# Patient Record
Sex: FEMALE | Race: WHITE | Employment: OTHER | URBAN - METROPOLITAN AREA
[De-identification: names, ages, dates, MRNs, and addresses within clinical notes are randomized per-mention and may not be internally consistent; named-entity substitution may affect disease eponyms.]

---

## 2017-03-01 ENCOUNTER — HOSPITAL ENCOUNTER (OUTPATIENT)
Dept: CT IMAGING | Age: 82
Discharge: HOME | End: 2017-03-01
Attending: PHYSICIAN ASSISTANT
Payer: MEDICARE

## 2017-03-01 DIAGNOSIS — R93.89 ABNORMAL CT SCAN: ICD-10-CM

## 2017-03-01 PROCEDURE — 71250 CT THORAX DX C-: CPT

## 2017-05-15 NOTE — PERIOP NOTES
[facility]  Ambulatory Surgery Unit  Pre-operative Instructions for Endo Procedures    Procedure Date  5/17/15            Tentative Arrival Time TBA      1. On the day of your procedure, please report to the Ambulatory Surgery Unit Registration Desk and sign in at your designated time. The Ambulatory Surgery Unit is located in [facility] on the [facility] side of the [facility] across from the [address] [place] [address]. Please have all of your health insurance cards and a photo ID. 2. You must have someone with you to drive you home, as you should not drive a car for 24 hours following anesthesia. Please make arrangements for a responsible adult friend or family member to stay with you for at least the first 24 hours after your procedure. 3. Do not have anything to eat or drink (including water, gum, mints, coffee, juice) after midnight   5/16/17. This may not apply to medications prescribed by your physician. (Please note below the special instructions with medications to take the morning of your procedure.)    4. If applicable, follow the clear liquid diet and bowel prep instructions provided by your physician's office. If you do not have this information, or have any questions, please contact your physician's office. 5. We recommend you do not drink any alcoholic beverages for 24 hours before and after your procedure. 6. Stop all Aspirin, non-steroidal anti-inflammatory drugs (i.e. Advil, Aleve), vitamins, and supplements as directed by your surgeon's office. **If you are currently taking Plavix, Coumadin, or other blood-thinning agents, contact your surgeon for instructions. **    7. In an effort to help prevent surgical site infection, we ask that you shower with an anti-bacterial soap (i.e. Dial or Safeguard) on the morning of your procedure. Do not apply any lotions, powders, or deodorants after showering. 8. Wear comfortable clothes. Wear glasses instead of contacts.  Do not bring any jewelry or money (other than copays or fees as instructed). Do not wear make-up, particularly mascara, the morning of your procedure. Wear your hair loose or down, no ponytails, buns, abdi pins or clips. All body piercings must be removed. 9. You should understand that if you do not follow these instructions your procedure may be cancelled. If your physical condition changes (i.e. fever, cold or flu) please contact your surgeon as soon as possible. 10. It is important that you be on time. If a situation occurs where you may be late, or if you have any questions or problems, please call [phone]. 11. Your procedure time may be subject to change. You will receive a phone call the day prior to confirm your arrival time. Special Instructions: Take all medications and inhalers, as prescribed, on the morning of surgery with a sip of water EXCEPT: plavix, lipitor, aldactazide      I understand a pre-operative phone call will be made to verify my procedure time. In the event that I am not available, I give permission for a message to be left on my answering service and/or with another person? Yes    Instructions given to patient during pat phone call.  Patient verbalized understanding.         ___________________      ___________________      ___________________  (Signature of Patient)          (Witness)                   (Date and Time)

## 2017-05-16 ENCOUNTER — ANESTHESIA EVENT (OUTPATIENT)
Dept: SURGERY | Age: 82
End: 2017-05-16
Payer: MEDICARE

## 2017-05-17 ENCOUNTER — HOSPITAL ENCOUNTER (OUTPATIENT)
Age: 82
Setting detail: OUTPATIENT SURGERY
Discharge: HOME | End: 2017-05-17
Attending: OTOLARYNGOLOGY | Admitting: OTOLARYNGOLOGY
Payer: MEDICARE

## 2017-05-17 ENCOUNTER — ANESTHESIA (OUTPATIENT)
Dept: SURGERY | Age: 82
End: 2017-05-17
Payer: MEDICARE

## 2017-05-17 VITALS
DIASTOLIC BLOOD PRESSURE: 84 MMHG | BODY MASS INDEX: 20.49 KG/M2 | HEART RATE: 59 BPM | TEMPERATURE: 97.6 F | RESPIRATION RATE: 14 BRPM | SYSTOLIC BLOOD PRESSURE: 161 MMHG | HEIGHT: 65 IN | WEIGHT: 123 LBS | OXYGEN SATURATION: 100 %

## 2017-05-17 LAB
ATRIAL RATE: 56 BPM
CALCULATED P AXIS, ECG09: 62 DEGREES
CALCULATED R AXIS, ECG10: -37 DEGREES
CALCULATED T AXIS, ECG11: 52 DEGREES
DIAGNOSIS, 93000: NORMAL
P-R INTERVAL, ECG05: 172 MS
Q-T INTERVAL, ECG07: 486 MS
QRS DURATION, ECG06: 96 MS
QTC CALCULATION (BEZET), ECG08: 468 MS
VENTRICULAR RATE, ECG03: 56 BPM

## 2017-05-17 PROCEDURE — 77030021440: Performed by: OTOLARYNGOLOGY

## 2017-05-17 PROCEDURE — 74011250636 HC RX REV CODE- 250/636: Performed by: ANESTHESIOLOGY

## 2017-05-17 PROCEDURE — 76030000000 HC AMB SURG OR TIME 0.5 TO 1: Performed by: OTOLARYNGOLOGY

## 2017-05-17 PROCEDURE — 77030018836 HC SOL IRR NACL ICUM -A: Performed by: OTOLARYNGOLOGY

## 2017-05-17 PROCEDURE — 76210000050 HC AMBSU PH II REC 0.5 TO 1 HR: Performed by: OTOLARYNGOLOGY

## 2017-05-17 PROCEDURE — 77030021352 HC CBL LD SYS DISP COVD -B: Performed by: OTOLARYNGOLOGY

## 2017-05-17 PROCEDURE — 76210000040 HC AMBSU PH I REC FIRST 0.5 HR: Performed by: OTOLARYNGOLOGY

## 2017-05-17 PROCEDURE — 77030021668 HC NEB PREFIL KT VYRM -A: Performed by: OTOLARYNGOLOGY

## 2017-05-17 PROCEDURE — 76060000061 HC AMB SURG ANES 0.5 TO 1 HR: Performed by: OTOLARYNGOLOGY

## 2017-05-17 PROCEDURE — 74011250636 HC RX REV CODE- 250/636: Performed by: OTOLARYNGOLOGY

## 2017-05-17 PROCEDURE — 77030026438 HC STYL ET INTUB CARD -A: Performed by: NURSE ANESTHETIST, CERTIFIED REGISTERED

## 2017-05-17 PROCEDURE — 74011000250 HC RX REV CODE- 250

## 2017-05-17 PROCEDURE — 74011250636 HC RX REV CODE- 250/636

## 2017-05-17 PROCEDURE — 93005 ELECTROCARDIOGRAM TRACING: CPT

## 2017-05-17 PROCEDURE — 77030020255 HC SOL INJ LR 1000ML BG: Performed by: OTOLARYNGOLOGY

## 2017-05-17 PROCEDURE — 77030008684 HC TU ET CUF COVD -B: Performed by: NURSE ANESTHETIST, CERTIFIED REGISTERED

## 2017-05-17 RX ORDER — ONDANSETRON 2 MG/ML
INJECTION INTRAMUSCULAR; INTRAVENOUS AS NEEDED
Status: DISCONTINUED | OUTPATIENT
Start: 2017-05-17 | End: 2017-05-17 | Stop reason: HOSPADM

## 2017-05-17 RX ORDER — PROPOFOL 10 MG/ML
INJECTION, EMULSION INTRAVENOUS AS NEEDED
Status: DISCONTINUED | OUTPATIENT
Start: 2017-05-17 | End: 2017-05-17 | Stop reason: HOSPADM

## 2017-05-17 RX ORDER — LIDOCAINE HYDROCHLORIDE 20 MG/ML
INJECTION, SOLUTION EPIDURAL; INFILTRATION; INTRACAUDAL; PERINEURAL AS NEEDED
Status: DISCONTINUED | OUTPATIENT
Start: 2017-05-17 | End: 2017-05-17 | Stop reason: HOSPADM

## 2017-05-17 RX ORDER — FENTANYL CITRATE 50 UG/ML
INJECTION, SOLUTION INTRAMUSCULAR; INTRAVENOUS AS NEEDED
Status: DISCONTINUED | OUTPATIENT
Start: 2017-05-17 | End: 2017-05-17 | Stop reason: HOSPADM

## 2017-05-17 RX ORDER — SODIUM CHLORIDE 0.9 % (FLUSH) 0.9 %
5-10 SYRINGE (ML) INJECTION AS NEEDED
Status: DISCONTINUED | OUTPATIENT
Start: 2017-05-17 | End: 2017-05-17 | Stop reason: HOSPADM

## 2017-05-17 RX ORDER — GLYCOPYRROLATE 0.2 MG/ML
INJECTION INTRAMUSCULAR; INTRAVENOUS AS NEEDED
Status: DISCONTINUED | OUTPATIENT
Start: 2017-05-17 | End: 2017-05-17 | Stop reason: HOSPADM

## 2017-05-17 RX ORDER — MAG HYDROX/ALUMINUM HYD/SIMETH 200-200-20
SUSPENSION, ORAL (FINAL DOSE FORM) ORAL
Status: DISCONTINUED
Start: 2017-05-17 | End: 2017-05-17 | Stop reason: HOSPADM

## 2017-05-17 RX ORDER — SUCCINYLCHOLINE CHLORIDE 20 MG/ML
INJECTION INTRAMUSCULAR; INTRAVENOUS AS NEEDED
Status: DISCONTINUED | OUTPATIENT
Start: 2017-05-17 | End: 2017-05-17 | Stop reason: HOSPADM

## 2017-05-17 RX ORDER — SODIUM CHLORIDE, SODIUM LACTATE, POTASSIUM CHLORIDE, CALCIUM CHLORIDE 600; 310; 30; 20 MG/100ML; MG/100ML; MG/100ML; MG/100ML
25 INJECTION, SOLUTION INTRAVENOUS CONTINUOUS
Status: DISCONTINUED | OUTPATIENT
Start: 2017-05-17 | End: 2017-05-17 | Stop reason: HOSPADM

## 2017-05-17 RX ORDER — ROCURONIUM BROMIDE 10 MG/ML
INJECTION, SOLUTION INTRAVENOUS AS NEEDED
Status: DISCONTINUED | OUTPATIENT
Start: 2017-05-17 | End: 2017-05-17 | Stop reason: HOSPADM

## 2017-05-17 RX ORDER — MORPHINE SULFATE 10 MG/ML
2 INJECTION, SOLUTION INTRAMUSCULAR; INTRAVENOUS
Status: DISCONTINUED | OUTPATIENT
Start: 2017-05-17 | End: 2017-05-17 | Stop reason: HOSPADM

## 2017-05-17 RX ORDER — ALUMINA, MAGNESIA, AND SIMETHICONE 2400; 2400; 240 MG/30ML; MG/30ML; MG/30ML
30 SUSPENSION ORAL
Status: DISCONTINUED | OUTPATIENT
Start: 2017-05-17 | End: 2017-05-17 | Stop reason: HOSPADM

## 2017-05-17 RX ORDER — LIDOCAINE HYDROCHLORIDE 10 MG/ML
0.1 INJECTION, SOLUTION EPIDURAL; INFILTRATION; INTRACAUDAL; PERINEURAL AS NEEDED
Status: DISCONTINUED | OUTPATIENT
Start: 2017-05-17 | End: 2017-05-17 | Stop reason: HOSPADM

## 2017-05-17 RX ORDER — DIPHENHYDRAMINE HYDROCHLORIDE 50 MG/ML
12.5 INJECTION, SOLUTION INTRAMUSCULAR; INTRAVENOUS AS NEEDED
Status: DISCONTINUED | OUTPATIENT
Start: 2017-05-17 | End: 2017-05-17 | Stop reason: HOSPADM

## 2017-05-17 RX ORDER — ACETAMINOPHEN 10 MG/ML
INJECTION, SOLUTION INTRAVENOUS AS NEEDED
Status: DISCONTINUED | OUTPATIENT
Start: 2017-05-17 | End: 2017-05-17 | Stop reason: HOSPADM

## 2017-05-17 RX ORDER — OXYCODONE AND ACETAMINOPHEN 5; 325 MG/1; MG/1
1 TABLET ORAL ONCE
Status: DISCONTINUED | OUTPATIENT
Start: 2017-05-17 | End: 2017-05-17 | Stop reason: HOSPADM

## 2017-05-17 RX ORDER — FAMOTIDINE 10 MG/ML
INJECTION INTRAVENOUS
Status: COMPLETED
Start: 2017-05-17 | End: 2017-05-17

## 2017-05-17 RX ORDER — FENTANYL CITRATE 50 UG/ML
25 INJECTION, SOLUTION INTRAMUSCULAR; INTRAVENOUS
Status: DISCONTINUED | OUTPATIENT
Start: 2017-05-17 | End: 2017-05-17 | Stop reason: HOSPADM

## 2017-05-17 RX ORDER — NALOXONE HYDROCHLORIDE 0.4 MG/ML
INJECTION, SOLUTION INTRAMUSCULAR; INTRAVENOUS; SUBCUTANEOUS AS NEEDED
Status: DISCONTINUED | OUTPATIENT
Start: 2017-05-17 | End: 2017-05-17 | Stop reason: HOSPADM

## 2017-05-17 RX ORDER — CLINDAMYCIN PHOSPHATE 900 MG/50ML
900 INJECTION INTRAVENOUS ONCE
Status: COMPLETED | OUTPATIENT
Start: 2017-05-17 | End: 2017-05-17

## 2017-05-17 RX ORDER — SODIUM CHLORIDE 0.9 % (FLUSH) 0.9 %
5-10 SYRINGE (ML) INJECTION EVERY 8 HOURS
Status: DISCONTINUED | OUTPATIENT
Start: 2017-05-17 | End: 2017-05-17 | Stop reason: HOSPADM

## 2017-05-17 RX ORDER — DEXAMETHASONE SODIUM PHOSPHATE 4 MG/ML
10 INJECTION, SOLUTION INTRA-ARTICULAR; INTRALESIONAL; INTRAMUSCULAR; INTRAVENOUS; SOFT TISSUE ONCE
Status: COMPLETED | OUTPATIENT
Start: 2017-05-17 | End: 2017-05-17

## 2017-05-17 RX ORDER — HYDROMORPHONE HYDROCHLORIDE 1 MG/ML
.2-.5 INJECTION, SOLUTION INTRAMUSCULAR; INTRAVENOUS; SUBCUTANEOUS ONCE
Status: DISCONTINUED | OUTPATIENT
Start: 2017-05-17 | End: 2017-05-17 | Stop reason: HOSPADM

## 2017-05-17 RX ADMIN — FENTANYL CITRATE 25 MCG: 50 INJECTION, SOLUTION INTRAMUSCULAR; INTRAVENOUS at 08:01

## 2017-05-17 RX ADMIN — SUCCINYLCHOLINE CHLORIDE 120 MG: 20 INJECTION INTRAMUSCULAR; INTRAVENOUS at 08:04

## 2017-05-17 RX ADMIN — FENTANYL CITRATE 25 MCG: 50 INJECTION, SOLUTION INTRAMUSCULAR; INTRAVENOUS at 08:24

## 2017-05-17 RX ADMIN — LIDOCAINE HYDROCHLORIDE 60 MG: 20 INJECTION, SOLUTION EPIDURAL; INFILTRATION; INTRACAUDAL; PERINEURAL at 08:04

## 2017-05-17 RX ADMIN — FENTANYL CITRATE 25 MCG: 50 INJECTION, SOLUTION INTRAMUSCULAR; INTRAVENOUS at 08:04

## 2017-05-17 RX ADMIN — ROCURONIUM BROMIDE 10 MG: 10 INJECTION, SOLUTION INTRAVENOUS at 08:04

## 2017-05-17 RX ADMIN — GLYCOPYRROLATE 0.1 MG: 0.2 INJECTION INTRAMUSCULAR; INTRAVENOUS at 08:04

## 2017-05-17 RX ADMIN — NALOXONE HYDROCHLORIDE 0.1 MG: 0.4 INJECTION, SOLUTION INTRAMUSCULAR; INTRAVENOUS; SUBCUTANEOUS at 08:50

## 2017-05-17 RX ADMIN — DEXAMETHASONE SODIUM PHOSPHATE 10 MG: 4 INJECTION, SOLUTION INTRAMUSCULAR; INTRAVENOUS at 07:14

## 2017-05-17 RX ADMIN — PROPOFOL 50 MG: 10 INJECTION, EMULSION INTRAVENOUS at 08:04

## 2017-05-17 RX ADMIN — ONDANSETRON 4 MG: 2 INJECTION INTRAMUSCULAR; INTRAVENOUS at 08:13

## 2017-05-17 RX ADMIN — FENTANYL CITRATE 25 MCG: 50 INJECTION, SOLUTION INTRAMUSCULAR; INTRAVENOUS at 08:10

## 2017-05-17 RX ADMIN — NALOXONE HYDROCHLORIDE 0.1 MG: 0.4 INJECTION, SOLUTION INTRAMUSCULAR; INTRAVENOUS; SUBCUTANEOUS at 08:42

## 2017-05-17 RX ADMIN — NALOXONE HYDROCHLORIDE 0.1 MG: 0.4 INJECTION, SOLUTION INTRAMUSCULAR; INTRAVENOUS; SUBCUTANEOUS at 08:47

## 2017-05-17 RX ADMIN — PROPOFOL 50 MG: 10 INJECTION, EMULSION INTRAVENOUS at 08:24

## 2017-05-17 RX ADMIN — FAMOTIDINE 20 MG: 10 INJECTION, SOLUTION INTRAVENOUS at 09:31

## 2017-05-17 RX ADMIN — ACETAMINOPHEN 1000 MG: 10 INJECTION, SOLUTION INTRAVENOUS at 08:13

## 2017-05-17 RX ADMIN — PROPOFOL 50 MG: 10 INJECTION, EMULSION INTRAVENOUS at 08:10

## 2017-05-17 RX ADMIN — CLINDAMYCIN PHOSPHATE 900 MG: 18 INJECTION, SOLUTION INTRAVENOUS at 08:01

## 2017-05-17 RX ADMIN — PROPOFOL 50 MG: 10 INJECTION, EMULSION INTRAVENOUS at 08:05

## 2017-05-17 RX ADMIN — NALOXONE HYDROCHLORIDE 0.1 MG: 0.4 INJECTION, SOLUTION INTRAMUSCULAR; INTRAVENOUS; SUBCUTANEOUS at 08:43

## 2017-05-17 RX ADMIN — SODIUM CHLORIDE, SODIUM LACTATE, POTASSIUM CHLORIDE, AND CALCIUM CHLORIDE 25 ML/HR: 600; 310; 30; 20 INJECTION, SOLUTION INTRAVENOUS at 07:13

## 2017-05-17 NOTE — OP NOTES
[address], [address]   OP NOTE       Name:  April Tucker   MR#:  [id number]   :  1935   Account #:  [de-identified]    Surgery Date:  2017   Date of Adm:  2017       PREOPERATIVE DIAGNOSIS: Dysphagia, cricopharyngeal muscle   spasm. POSTOPERATIVE DIAGNOSIS: Dysphagia, cricopharyngeal muscle   spasm. PROCEDURES PERFORMED:  Telescopic direct laryngoscopy,   esophagoscopy, Botox injection to cricopharyngeal muscle. INDICATIONS: The patient is an 80-year-old female with a long history   of chronic dysphagia. Previous esophageal dilations have been   unhelpful. A previous barium swallow confirmed the presence of a   significantly restricted hypopharynx due to cricopharyngeal muscle   spasm. Preoperatively, the alternatives, potential benefits, and   possible risks of the procedure were explained to the patient, who   understood and requested to proceed. SURGEONS   1. Sander Hilliard. Aayush Rodriguez MD   2. Magalie Zaldivar. MD Callie    ANESTHESIA: General endotracheal tube anesthesia. ESTIMATED BLOOD LOSS: 0.2 mL. SPECIMENS REMOVED: None. COMPLICATIONS: None. DESCRIPTION OF PROCEDURE: The patient was brought to the   operating room, placed supine on the operating table, and following the   smooth induction of general endotracheal tube anesthesia, the table   was turned 90 degrees and the patient was positioned for operation. The Jako laryngoscope was advanced into the oral cavity after   application of a maxillary alveolar guard. The base of tongue, vallecula,   piriform sinuses, postcricoid area, and endolarynx were inspected. A   residual food from yesterday's meals was observed laterally within the   region of the pharynx and piriform sinuses. The mucosa was cleared of   all food debris. The mucosa appeared intact throughout the larynx and   pharyngeal regions.  With advancement of the laryngoscope, the   cricopharyngeus region could be identified. No evidence of Zenker's   diverticulum could be seen. Attention was turned to esophagoscopy. The 29 cm rigid esophagoscope was fully passed. The   cricopharyngeus muscle region was identified. The esophagus distal   on examination showed no mucosal surface abnormalities. Once   again, a Zenker's diverticulum was not identified. Attention was then   turned to Botox injection of the cricopharyngeus. The esophagoscope   was removed and the laryngoscope was again utilized to visualize the   cricopharyngeus region. The laryngoscope was placed into suspension   and the operating telescope was used to conclusively identify the   cricopharyngeus muscle with palpation using a blunt suction. Once the   muscle was clearly identified, 100 units of botulinum toxin type A was   injected in 4 quadrants with care taken to avoid proximity to the   posterior cricoarytenoid muscle. Once the injection was completed, the   area was inspected. There was no evidence of significant bleeding and   the laryngoscope was removed from the oral cavity as well as the   maxillary alveolar guard. The patient tolerated the procedure well, was   aroused from general anesthesia, extubated in the operating room,   and transferred to the recovery area in satisfactory condition.         Marlene Paez MD      [facility] / [facility]   D:  05/17/2017   09:13   T:  05/17/2017   09:38   Job #:  [id number]

## 2017-05-17 NOTE — ANESTHESIA PREPROCEDURE EVALUATION
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  Psychiatric history ( depression)    Comments: Diabetic neuropathy Cardiovascular    Hypertension              Exercise tolerance: >4 METS  Comments: No EKG on file   GI/Hepatic/Renal     GERD          Comments: dysphagia Endo/Other    Diabetes: type 2  Hypothyroidism       Other Findings   Comments: Sjogrens syndrome  Systemic Lupus           Physical Exam    Airway  Mallampati: III  TM Distance: 4 - 6 cm    Mouth opening: Normal     Cardiovascular  Regular rate and rhythm,  S1 and S2 normal,  no murmur, click, rub, or gallop  Rhythm: regular  Rate: normal      Pertinent negatives: No murmur   Dental    Dentition: Full lower dentures and Full upper dentures     Pulmonary  Breath sounds clear to auscultation               Abdominal  GI exam deferred       Other Findings            Anesthetic Plan    ASA: 3  Anesthesia type: total IV anesthesia and general          Induction: Intravenous  Anesthetic plan and risks discussed with: Patient      Took beta blocker this am.

## 2017-05-17 NOTE — IP AVS SNAPSHOT
[address] [facility] [address] [phone] Patient: Floyd Chavira MRN: [id number] :1935 You are allergic to the following Allergen Reactions Imuran (Azathioprine) Other (comments) Lupus attack Iodinated Contrast Media - Oral And Iv Dye Hives Gadavist (Gadobutrol) Hives Itching Scant hives to back post receiving injection of gadavist. Denied any breathing problems or throat swelling. Resolved within 30 minutes. MRI contrast Gadolinnium) Recent Documentation Height Weight BMI OB Status Smoking Status 1.651 m 55.8 kg 20.47 kg/m2 Postmenopausal Never Smoker Emergency Contacts Name Discharge Info Relation Home Work Mobile Rafael Lawson DISCHARGE CAREGIVER [3] Son [22] [phone] Mee Stewrad DISCHARGE CAREGIVER [3] Child [2] [phone] About your hospitalization You were admitted on:  May 17, 2017 You last received care in the:  [facility] ASU PACU You were discharged on:  May 17, 2017 Unit phone number:  [phone] Why you were hospitalized Your primary diagnosis was:  Not on File Providers Seen During Your Hospitalizations Provider Role Specialty Primary office phone Lawanda Gallegos MD Attending Provider Otolaryngology [phone] Your Primary Care Physician (PCP) Primary Care Physician Office Phone Office Fax [place] Graff [phone] Follow-up Information Follow up With Details Comments Contact Info Tesha Juárez MD   [address] [place] [address] [phone] Your Appointments Friday May 19, 2017 ESOPHAGOGASTRODUODENOSCOPY (EGD) with Brandon Akhtar MD  
[facility] ENDOSCOPY (RI OR PRE ASSESSMENT) [address] [facility] [address].  
[phone]  2017  1:00 PM EDT  
 Follow Up with Reanna Blunt MD  
Neurology Clinic at [facility]) [address] [facility], 
[address] [place] [address], [address] [place]  
[phone] Current Discharge Medication List  
  
CONTINUE these medications which have NOT CHANGED Dose & Instructions Dispensing Information Comments Morning Noon Evening Bedtime  
 atorvastatin 40 mg tablet Commonly known as:  LIPITOR Your last dose was: Your next dose is:    
   
   
 Dose:  20 mg Take 20 mg by mouth daily (after lunch). Refills:  0  
     
   
   
   
  
 citalopram 20 mg tablet Commonly known as:  Dorthea Manuel Your last dose was: Your next dose is:    
   
   
 Dose:  20 mg Take 20 mg by mouth daily (after lunch). Refills:  0  
     
   
   
   
  
 clopidogrel 75 mg Tab Commonly known as:  PLAVIX Your last dose was: Your next dose is:    
   
   
 Dose:  75 mg Take 75 mg by mouth daily (after lunch). Refills:  0  
     
   
   
   
  
 diclofenac 1 % Gel Commonly known as:  VOLTAREN Your last dose was: Your next dose is:    
   
   
 Dose:  4 g Apply 4 g to affected area as needed. Refills:  0  
     
   
   
   
  
 hydroxychloroquine 200 mg tablet Commonly known as:  PLAQUENIL Your last dose was: Your next dose is:    
   
   
 Dose:  400 mg Take 400 mg by mouth daily. Refills:  0  
     
   
   
   
  
 metoprolol succinate 50 mg XL tablet Commonly known as:  TOPROL-XL Your last dose was: Your next dose is:    
   
   
 Dose:  25 mg Take 25 mg by mouth daily (after lunch). Refills:  0 Prevacid 30 mg disintegrating tablet Generic drug:  lansoprazole Your last dose was: Your next dose is:    
   
   
 Dose:  30 mg Take 30 mg by mouth daily. Refills:  0 spironolactone-hydrochlorothiazide 25-25 mg per tablet Commonly known as:  ALDACTAZIDE Your last dose was: Your next dose is:    
   
   
 Dose:  0.5 Tab Take 0.5 Tabs by mouth daily (after lunch). Refills:  0 Discharge Instructions [facility], FANI Longo. Cesar Michel M.D., Ph.D., F.A.C.S. Otolaryngology [address] [place] [address] [address] [place], [address] [place] [address] 
[phone] INSTRUCTIONS CONCERNING ENDOSCOPY-ADULT 
 
WHAT TO EXPECT AFTER DISCHARGE: 
1. You may return to normal activities in 24 hours. Call Dr. Ivan Garcia office for a follow up appointment in 3 weeks. 2. Liquids are allowed as soon as you wake up well in the recovery. Cold water feels good on your throat so we encourage you to drink plenty of cold water. We suggest you keep ice water at the bedside so when you wake up with the feeling of a full throat, you can easily clear your throat with the cold water. Plenty of fluids will prevent dehydration. 3. Eat soft foods as tolerated. Avoid spicy and salty foods and sharp pointy foods, such as potato chips or toast for 1-2 days. Warm foods or fluids are fine. Things like mashed potatoes, macaroni and cheese are great. Call my office at [phone] with any questions or concerns. 
 
>>>You received an IV form of Tylenol 1000mg during your surgery, you may take tylenol (or pain medication containing Tylenol or Acetaminophen) in 6 hours at 2:30pm.<<< 
 
 
 
TAKE NARCOTIC PAIN MEDICATIONS WITH FOOD Narcotics tend to be constipating, we suggest taking a stool softener such as Colace or Miralax (follow package instructions). DO NOT DRIVE WHILE TAKING NARCOTIC PAIN MEDICATIONS. DO NOT TAKE SLEEPING MEDICATIONS OR ANTIANXIETY MEDICATIONS WHILE TAKING NARCOTIC PAIN MEDICATIONS,  ESPECIALLY THE NIGHT OF ANESTHESIA. CPAP PATIENTS BE SURE TO WEAR MACHINE WHENEVER NAPPING OR SLEEPING. DISCHARGE SUMMARY from Nurse The following personal items collected during your admission are returned to you:  
Dental Appliance: Dental Appliances: Lowers, Uppers RETURNED TO PT Vision: Visual Aid: Glasses RETURNED TO PT Hearing Aid:   
Jewelry: Jewelry: None Clothing: Clothing: Pants, Shirt, Undergarments, With patient Other Valuables:   
Valuables sent to safe:   
 
 
PATIENT INSTRUCTIONS: 
 
After General Anesthesia or Intravenous Sedation, for 24 hours or while taking prescription Narcotics: 
      Someone should be with you for the next 24 hours. For your own safety, a responsible adult must drive you home. · Limit your activities · Recommended activity: Rest today, up with assistance today. Do not climb stairs or shower unattended for the next 24 hours. · Please start with a soft bland diet and advance as tolerated (no nausea) to regular diet. · If you have a sore throat you should try the following: fluids, warm salt water gargles, or throat lozenges. If it does not improve after several days please follow up with your primary physician. · Do not drive and operate hazardous machinery · Do not make important personal or business decisions · Do  not drink alcoholic beverages · If you have not urinated within 8 hours after discharge, please contact your surgeon on call. Report the following to your surgeon: 
· Excessive pain, swelling, redness or odor of or around the surgical area · Temperature over 100.5 · Nausea and vomiting lasting longer than 4 hours or if unable to take medications · Any signs of decreased circulation or nerve impairment to extremity: change in color, persistent  numbness, tingling, coldness or increase pain · You will receive a Post Operative Call from one of the Recovery Room Nurses on the day after your surgery to check on you.  It is very important for us to know how you are recovering after your surgery. If you have an issue or need to speak with someone, please call your surgeon, do not wait for the post operative call. · You may receive an e-mail or letter in the mail from [facility] regarding your experience with us in the Ambulatory Surgery Unit. Your feedback is valuable to us and we appreciate your participation in the survey. · If the above instructions are not adequate, please contact Rose Griffith RN, [facility]ani anesthesia Nurse Manager or our Anesthesiologist, at [phone]. If you are having problems after your surgery, call the physician at his office number. · We wish you a speedy recovery ? What to do at Home: *  Please give a list of your current medications to your Primary Care Provider. *  Please update this list whenever your medications are discontinued, doses are 
    changed, or new medications (including over-the-counter products) are added. *  Please carry medication information at all times in case of emergency situations. [address] THROMBOSIS AND PULMONARY EMBOLUS 
 
SURGICAL PATIENTS Surgical patients are the #1 risk for DVT and PE. WHAT IS DVT? WHAT IS PE? 
DVT is a serious condition where blood clots develop deep in the veins of the legs. PE occurs when a blood clot breaks loose from the wall of a vein and travels to the lungs blocking the pulmonary artery or one of its branches impairing blood flow from the heart, which could result in death. RISK FACTORS 
? Surgery lasting longer than 45 minutes ? History of inflammatory bowel disease 
? Oral contraceptive or hormone replacement therapy ? Immobilization ? Varicose veins / swollen legs ? Smoking  
? CHF / Acute MI / Irregular heart beat ? Family history of thrombosis ? General anesthesia greater than 2 hours ? Obesity ? Infection of less than one month ? Less than 1 month postpartum ? COPD / Pneumonia ? Arthroscopic surgery ? Malignancy / cancer ? Spine surgery ? Blood abnormalities ? Stroke / Paralysis / Coma SIGNS AND SYMPTOMS OF DEEP VEIN TROMBOSIS Usually occurs in one leg, above or below the knee ? Swelling  one calf or thigh may be larger than the other ? Feeling increased warmth in the area of the leg that is swollen or painful ? Leg pain, which may increase when standing or walking ? Swelling along the vein of the leg ? When swollen areas is pressed with a finger, a depression may remain ? Tenderness of the leg that may be confined to one area ? Change in leg color (bluish or red) SIGNS AND SYMPTOMS OF PULMONARY EMBOLUS 
? Chest pain that gets worse with deep breath, coughing or chest movement ? Coughing up blood ? Sweating ? Shortness of breath or difficulty breathing ? Rapid heart beat ? Lightheadedness HOW TO REDUCE THE POSSIBLE RISK OF DVT ? Exercise  simple activities as rotating ankles and wrists, wiggling toes and fingers, tightening and relaxing muscles in calves and thighs promotes circulation while recovering from surgery. Please do these exercises every hour during waking hours ? BRIDGET hose  If you have been given white support hose while having surgery, wear them home. You may remove them for half and hour every 8-hour period and stop wearing them 48 hours after surgery or as prescribed by your physician. BRIDGET hose may be reused for air travel or extended car travel ? Take mediation as prescribed by your physician (Lovenox, Coumadin, Aspirin) ? Resume your normal activities as soon as your doctor advises you to do so. 
? Remember, [facility] traveling[facility] frequently. Wear non skid shoes when BRIDGET hose are on. They are very slippery! PATIENTS WHO BELIEVE THEY MAY BE EXPERIENCING SIGNS AND SYMPTOMS OF DVT OR PE SHOULD SEEK MEDICAL HELP IMMEDIATELY These are general instructions for a healthy lifestyle: No smoking/ No tobacco products/ Avoid exposure to second hand smoke Surgeon General's Warning:  Quitting smoking now greatly reduces serious risk to your health. Obesity, smoking, and sedentary lifestyle greatly increases your risk for illness A healthy diet, regular physical exercise & weight monitoring are important for maintaining a healthy lifestyle You may be retaining fluid if you have a history of heart failure or if you experience any of the following symptoms:  Weight gain of 3 pounds or more overnight or 5 pounds in a week, increased swelling in our hands or feet or shortness of breath while lying flat in bed. Please call your doctor as soon as you notice any of these symptoms; do not wait until your next office visit. Recognize signs and symptoms of STROKE: 
 
B - Balance E - Eyes F-  Face looks uneven A-  Arms unable to move or move even S-  Speech slurred or non-existent T-  Time-call 911 as soon as signs and symptoms begin-DO NOT go Back to bed or wait to see if you get better-TIME IS BRAIN. If you have not received your influenza and/or pneumococcal vaccine, please follow up with your primary care physician. The discharge information has been reviewed with the patient and caregiver. The patient and caregiver verbalized understanding. Discharge Orders None Introducing [facility]! Raul Shell introduces [employer] patient portal. Now you can access parts of your medical record, email your doctor's office, and request medication refills online. 1. In your internet browser, go to [email or web address] 2. Click on the First Time User? Click Here link in the Sign In box. You will see the New Member Sign Up page. 3. Enter your [facility] Access Code exactly as it appears below. You will not need to use this code after youve completed the sign-up process.  If you do not sign up before the expiration date, you must request a new code. · [employer] Access Code: [email or web address] Expires: 7/5/2017 12:32 PM 
 
4. Enter the last four digits of your Social Security Number (xxxx) and Date of Birth (mm/dd/yyyy) as indicated and click Submit. You will be taken to the next sign-up page. 5. Create a [employer] ID. This will be your [employer] login ID and cannot be changed, so think of one that is secure and easy to remember. 6. Create a [employer] password. You can change your password at any time. 7. Enter your Password Reset Question and Answer. This can be used at a later time if you forget your password. 8. Enter your e-mail address. You will receive e-mail notification when new information is available in [address] 9. Click Sign Up. You can now view and download portions of your medical record. 10. Click the Download Summary menu link to download a portable copy of your medical information. If you have questions, please visit the Frequently Asked Questions section of the [employer] website. Remember, [employer] is NOT to be used for urgent needs. For medical emergencies, dial 911. Now available from your iPhone and Android! General Information Please provide this summary of care documentation to your next provider. Patient Signature:  ____________________________________________________________ Date:  ____________________________________________________________  
  
[facility] Search Provider Signature:  ____________________________________________________________ Date:  ____________________________________________________________

## 2017-05-17 NOTE — DISCHARGE INSTRUCTIONS
[facility], FANI Conner. Moriah Fay M.D., Ph.D., F.A.C.S. [facility], [address] [place] [address]  [phone]    INSTRUCTIONS CONCERNING ENDOSCOPY-ADULT    WHAT TO EXPECT AFTER DISCHARGE:  1. You may return to normal activities in 24 hours. Call Dr. Constantino Earing office for a follow up appointment in 3 weeks. 2. Liquids are allowed as soon as you wake up well in the recovery. Cold water feels good on your throat so we encourage you to drink plenty of cold water. We suggest you keep ice water at the bedside so when you wake up with the feeling of a full throat, you can easily clear your throat with the cold water. Plenty of fluids will prevent dehydration. 3. Eat soft foods as tolerated. Avoid spicy and salty foods and sharp pointy foods, such as potato chips or toast for 1-2 days. Warm foods or fluids are fine. Things like mashed potatoes, macaroni and cheese are great. Call my office at [phone] with any questions or concerns.    >>>You received an IV form of Tylenol 1000mg during your surgery, you may take tylenol (or pain medication containing Tylenol or Acetaminophen) in 6 hours at 2:30pm.<<<        TAKE NARCOTIC PAIN MEDICATIONS WITH FOOD   Narcotics tend to be constipating, we suggest taking a stool softener such as Colace or Miralax (follow package instructions). DO NOT DRIVE WHILE TAKING NARCOTIC PAIN MEDICATIONS. DO NOT TAKE SLEEPING MEDICATIONS OR ANTIANXIETY MEDICATIONS WHILE TAKING NARCOTIC PAIN MEDICATIONS,  ESPECIALLY THE NIGHT OF ANESTHESIA. CPAP PATIENTS BE SURE TO WEAR MACHINE WHENEVER NAPPING OR SLEEPING.     DISCHARGE SUMMARY from Nurse    The following personal items collected during your admission are returned to you:   Dental Appliance: Dental Appliances: Lowers, Uppers RETURNED TO PT  Vision: Visual Aid: Glasses RETURNED TO PT  Hearing Aid:    Jewelry: Jewelry: None  Clothing: Clothing: Pants, Shirt, Undergarments, With patient  Other Valuables:    Valuables sent to safe:        PATIENT INSTRUCTIONS:    After General Anesthesia or Intravenous Sedation, for 24 hours or while taking prescription Narcotics:        Someone should be with you for the next 24 hours. For your own safety, a responsible adult must drive you home. · Limit your activities  · Recommended activity: Rest today, up with assistance today. Do not climb stairs or shower unattended for the next 24 hours. · Please start with a soft bland diet and advance as tolerated (no nausea) to regular diet. · If you have a sore throat you should try the following: fluids, warm salt water gargles, or throat lozenges. If it does not improve after several days please follow up with your primary physician. · Do not drive and operate hazardous machinery  · Do not make important personal or business decisions  · Do  not drink alcoholic beverages  · If you have not urinated within 8 hours after discharge, please contact your surgeon on call. Report the following to your surgeon:  · Excessive pain, swelling, redness or odor of or around the surgical area  · Temperature over 100.5  · Nausea and vomiting lasting longer than 4 hours or if unable to take medications  · Any signs of decreased circulation or nerve impairment to extremity: change in color, persistent  numbness, tingling, coldness or increase pain      · You will receive a Post Operative Call from one of the Recovery Room Nurses on the day after your surgery to check on you. It is very important for us to know how you are recovering after your surgery. If you have an issue or need to speak with someone, please call your surgeon, do not wait for the post operative call. · You may receive an e-mail or letter in the mail from [facility] regarding your experience with us in the Ambulatory Surgery Unit.  Your feedback is valuable to us and we appreciate your participation in the survey. · If the above instructions are not adequate, please contact Francisco Reina RN, [facility] anesthesia Nurse Manager or our Anesthesiologist, at [phone]. If you are having problems after your surgery, call the physician at his office number. · We wish you a speedy recovery ? What to do at Home:      *  Please give a list of your current medications to your Primary Care Provider. *  Please update this list whenever your medications are discontinued, doses are      changed, or new medications (including over-the-counter products) are added. *  Please carry medication information at all times in case of emergency situations. Luis Felipe Lipscombian THROMBOSIS AND PULMONARY EMBOLUS    SURGICAL PATIENTS  Surgical patients are the #1 risk for DVT and PE. WHAT IS DVT? WHAT IS PE?  DVT is a serious condition where blood clots develop deep in the veins of the legs. PE occurs when a blood clot breaks loose from the wall of a vein and travels to the lungs blocking the pulmonary artery or one of its branches impairing blood flow from the heart, which could result in death.   RISK FACTORS   Surgery lasting longer than 45 minutes   History of inflammatory bowel disease   Oral contraceptive or hormone replacement therapy   Immobilization   Varicose veins / swollen legs   Smoking    CHF / Acute MI / Irregular heart beat   Family history of thrombosis   General anesthesia greater than 2 hours   Obesity   Infection of less than one month   Less than 1 month postpartum   COPD / Pneumonia   Arthroscopic surgery   Malignancy / cancer   Spine surgery   Blood abnormalities   Stroke / Paralysis / Coma    SIGNS AND SYMPTOMS OF DEEP VEIN TROMBOSIS  Usually occurs in one leg, above or below the knee   Swelling - one calf or thigh may be larger than the other   Feeling increased warmth in the area of the leg that is swollen or painful   Leg pain, which may increase when standing or walking   Swelling along the vein of the leg   When swollen areas is pressed with a finger, a depression may remain   Tenderness of the leg that may be confined to one area   Change in leg color (bluish or red)    SIGNS AND SYMPTOMS OF PULMONARY EMBOLUS   Chest pain that gets worse with deep breath, coughing or chest movement   Coughing up blood   Sweating   Shortness of breath or difficulty breathing   Rapid heart beat   Lightheadedness    HOW TO REDUCE THE POSSIBLE RISK OF DVT   Exercise - simple activities as rotating ankles and wrists, wiggling toes and fingers, tightening and relaxing muscles in calves and thighs promotes circulation while recovering from surgery. Please do these exercises every hour during waking hours   BRIDGET hose - If you have been given white support hose while having surgery, wear them home. You may remove them for half and hour every 8-hour period and stop wearing them 48 hours after surgery or as prescribed by your physician. BRIDGET hose may be reused for air travel or extended car travel   Take mediation as prescribed by your physician (Lovenox, Coumadin, Aspirin)   Resume your normal activities as soon as your doctor advises you to do so.  Remember, when traveling, to [place] your legs frequently. Wear non skid shoes when BRIDGET hose are on. They are very slippery! PATIENTS WHO BELIEVE THEY MAY BE EXPERIENCING SIGNS AND SYMPTOMS OF DVT OR PE SHOULD SEEK MEDICAL HELP IMMEDIATELY                 These are general instructions for a healthy lifestyle:    No smoking/ No tobacco products/ Avoid exposure to second hand smoke    Surgeon General's Warning:  Quitting smoking now greatly reduces serious risk to your health.     Obesity, smoking, and sedentary lifestyle greatly increases your risk for illness    A healthy diet, regular physical exercise & weight monitoring are important for maintaining a healthy lifestyle    You may be retaining fluid if you have a history of heart failure or if you experience any of the following symptoms:  Weight gain of 3 pounds or more overnight or 5 pounds in a week, increased swelling in our hands or feet or shortness of breath while lying flat in bed. Please call your doctor as soon as you notice any of these symptoms; do not wait until your next office visit. Recognize signs and symptoms of STROKE:    B - Balance  E - Eyes    F-  Face looks uneven    A-  Arms unable to move or move even    S-  Speech slurred or non-existent    T-  Time-call 911 as soon as signs and symptoms begin-DO NOT go       Back to bed or wait to see if you get better-TIME IS BRAIN. If you have not received your influenza and/or pneumococcal vaccine, please follow up with your primary care physician. The discharge information has been reviewed with the patient and caregiver. The patient and caregiver verbalized understanding.

## 2017-05-17 NOTE — BRIEF OP NOTE
BRIEF OPERATIVE NOTE    Date of Procedure: 5/17/2017   Preoperative Diagnosis: DYSPHAGIA  Postoperative Diagnosis: DYSPHAGIA    Procedure(s):  TELESCOPIC DIRECT LARYNGOSCOPY, ESOPHAGOSCOPY, BOTOX INJECTION TO CRICOPHARYNGEAL MUSCLE  Surgeon(s) and Role:     * Ana Mathew MD - Primary     * Harriet Charles MD         Assistant Staff:       Surgical Staff:  Circ-1: Wilian Camargo RN  Scrub Tech-1: Ciera Zurita  Event Time In   Incision Start 3812   Incision Close 9058     Anesthesia: General   Estimated Blood Loss: 0.2 ml  Specimens: * No specimens in log *   Findings: as expected   Complications: none apparent  Implants: * No implants in log *

## 2017-05-17 NOTE — PERIOP NOTES
Pt responsive stating lungs feel like stinging and reflux burning. 0930 Pt has taken water and Dr. Cesar Michel stated she has reflux and to medicate for that. Pt took her antireflux med last night and attempted Mylanta. Pt could not tolerate Mylanta so given Pepcid IV and to continue rest of medications at home. 0950 Pt. Alert. Denies pain or chill. Discharge instructions reviewed with caregiver and patient. Allowed and answered questions. Tolerating PO fluids. Both state ready for discharge.

## 2017-05-17 NOTE — ANESTHESIA POSTPROCEDURE EVALUATION
Post-Anesthesia Evaluation and Assessment    Patient: Lucille Kelly MRN: [id number]  SSN: [id number]    YOB: 1935  Age: 80 y.o. Sex: female       Cardiovascular Function/Vital Signs  Visit Vitals    /84    Pulse (!) 59    Temp 36.4 °C (97.6 °F)    Resp 14    Ht 5' 5\" (1.651 m)    Wt 55.8 kg (123 lb)    SpO2 100%    BMI 20.47 kg/m2       Patient is status post total IV anesthesia, general anesthesia for Procedure(s):  TELESCOPIC DIRECT LARYNGOSCOPY, ESOPHAGOSCOPY, BOTOX INJECTION TO CRICOPHARYNGEAL MUSCLE. Nausea/Vomiting: None    Postoperative hydration reviewed and adequate. Pain:  Pain Scale 1: Numeric (0 - 10) (05/17/17 0930)  Pain Intensity 1: 3 (05/17/17 0930)   Managed    Neurological Status:   Neuro (WDL): Within Defined Limits (05/17/17 0930)  Neuro  Neurologic State: Alert (05/17/17 0930)  LUE Motor Response: Spontaneous  (05/17/17 0930)  LLE Motor Response: Spontaneous  (05/17/17 0930)  RUE Motor Response: Spontaneous  (05/17/17 0930)  RLE Motor Response: Spontaneous  (05/17/17 0930)   At baseline    Mental Status and Level of Consciousness: Arousable    Pulmonary Status:   O2 Device: Room air (05/17/17 0930)   Adequate oxygenation and airway patent    Complications related to anesthesia: None    Post-anesthesia assessment completed.  No concerns    Signed By: Axel De León MD     May 17, 2017

## 2017-06-27 ENCOUNTER — OFFICE VISIT (OUTPATIENT)
Dept: NEUROLOGY | Age: 82
End: 2017-06-27

## 2017-06-27 VITALS
BODY MASS INDEX: 20.49 KG/M2 | HEIGHT: 65 IN | SYSTOLIC BLOOD PRESSURE: 116 MMHG | WEIGHT: 123 LBS | DIASTOLIC BLOOD PRESSURE: 70 MMHG | HEART RATE: 74 BPM | OXYGEN SATURATION: 98 %

## 2017-06-27 DIAGNOSIS — R25.9 ABNORMAL INVOLUNTARY MOVEMENT: ICD-10-CM

## 2017-06-27 DIAGNOSIS — G40.209 LOCALIZATION-RELATED PARTIAL EPILEPSY WITH COMPLEX PARTIAL SEIZURES (HCC): ICD-10-CM

## 2017-06-27 DIAGNOSIS — I67.89 CEREBRAL MICROVASCULAR DISEASE: ICD-10-CM

## 2017-06-27 DIAGNOSIS — I65.23 STENOSIS OF BOTH CAROTID ARTERIES WITHOUT CEREBRAL INFARCTION: Primary | ICD-10-CM

## 2017-06-27 DIAGNOSIS — G45.1 TRANSIENT ISCHEMIC ATTACK INVOLVING LEFT INTERNAL CAROTID ARTERY: ICD-10-CM

## 2017-06-27 NOTE — MR AVS SNAPSHOT
Visit Information Date & Time Provider Department Dept. Phone Encounter #  
 6/27/2017  1:00 PM Suni wSain MD [facility] at [facility] [phone] [id number] Follow-up Instructions Return in about 1 year (around 6/27/2018). Upcoming Health Maintenance Date Due HEMOGLOBIN A1C Q6M 1935 LIPID PANEL Q1 1935 FOOT EXAM Q1 10/23/1945 MICROALBUMIN Q1 10/23/1945 EYE EXAM RETINAL OR DILATED Q1 10/23/1945 DTaP/Tdap/Td series (1 - Tdap) 10/23/1956 ZOSTER VACCINE AGE 60> 10/23/1995 GLAUCOMA SCREENING Q2Y 10/23/2000 Pneumococcal 65+ Low/Medium Risk (1 of 2 - PCV13) 10/23/2000 MEDICARE YEARLY EXAM 10/23/2000 INFLUENZA AGE 9 TO ADULT 8/1/2017 Allergies as of 6/27/2017  Review Complete On: 6/27/2017 By: Suni Swain MD  
  
 Severity Noted Reaction Type Reactions Imuran [Azathioprine]  06/05/2015    Other (comments) Lupus attack Iodinated Contrast- Oral And Iv Dye  06/02/2016    Hives Gadavist [Gadobutrol] Low 03/27/2015    Hives, Itching Scant hives to back post receiving injection of gadavist. Denied any breathing problems or throat swelling. Resolved within 30 minutes. MRI contrast Gadolinnium) Current Immunizations  Never Reviewed No immunizations on file. Not reviewed this visit You Were Diagnosed With   
  
 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  
 116/70 74 5' 5\" (1.651 m) 123 lb (55.8 kg) 98% 20.47 kg/m2 OB Status Smoking Status Postmenopausal Never Smoker BMI and BSA Data Body Mass Index Body Surface Area  
 20.47 kg/m 2 1.6 m 2 Preferred Pharmacy Pharmacy Name Phone [facility] [facility] [place] [phone] Your Updated Medication List  
  
   
This list is accurate as of: 6/27/17  1:51 PM.  Always use your most recent med list.  
  
  
  
  
 atorvastatin 40 mg tablet Commonly known as:  LIPITOR Take 20 mg by mouth daily (after lunch). citalopram 20 mg tablet Commonly known as:  Orbie Curls Take 20 mg by mouth daily (after lunch). clopidogrel 75 mg Tab Commonly known as:  PLAVIX Take 75 mg by mouth daily (after lunch). diclofenac 1 % Gel Commonly known as:  VOLTAREN Apply 4 g to affected area as needed. hydroxychloroquine 200 mg tablet Commonly known as:  PLAQUENIL Take 400 mg by mouth daily. metoprolol succinate 50 mg XL tablet Commonly known as:  TOPROL-XL Take 25 mg by mouth daily (after lunch). Prevacid 30 mg disintegrating tablet Generic drug:  lansoprazole Take 30 mg by mouth daily. spironolactone-hydrochlorothiazide 25-25 mg per tablet Commonly known as:  ALDACTAZIDE Take 0.5 Tabs by mouth daily (after lunch). Follow-up Instructions Return in about 1 year (around 6/27/2018). To-Do List   
 07/05/2017 10:30 AM  
(Arrive by 10:15 AM) Appointment with MACY Shaffer at [facility] [address] ([phone]) NON-CONTRAST STUDY: 1. Bring any non [facility] facility films/images pertaining to the area of interest with you on the day of appointment. 2. Check in at registration at least 30 minutes before appt time unless you were instructed to do otherwise.  3. If you have to drink oral contrast please pick it up any weekday prior to your appointment, if you cannot please check in 2 hrs  before appt time. Please arrive 15 minutes prior to appointment to register Patient Instructions A Healthy Lifestyle: Care Instructions Your Care Instructions A healthy lifestyle can help you feel good, stay at a healthy weight, and have plenty of energy for both work and play. A healthy lifestyle is something you can share with your whole family. A healthy lifestyle also can lower your risk for serious health problems, such as high blood pressure, heart disease, and diabetes. You can follow a few steps listed below to improve your health and the health of your family. Follow-up care is a key part of your treatment and safety. Be sure to make and go to all appointments, and call your doctor if you are having problems. Its also a good idea to know your test results and keep a list of the medicines you take. How can you care for yourself at home? · Do not eat too much sugar, fat, or fast foods. You can still have dessert and treats now and then. The goal is moderation. · Start small to improve your eating habits. Pay attention to portion sizes, drink less juice and soda pop, and eat more fruits and vegetables. ¨ Eat a healthy amount of food. A 3-ounce serving of meat, for example, is about the size of a deck of cards. Fill the rest of your plate with vegetables and whole grains. ¨ Limit the amount of soda and sports drinks you have every day. Drink more water when you are thirsty. ¨ Eat at least 5 servings of fruits and vegetables every day. It may seem like a lot, but it is not hard to reach this goal. A serving or helping is 1 piece of fruit, 1 cup of vegetables, or 2 cups of leafy, raw vegetables. Have an apple or some carrot sticks as an afternoon snack instead of a candy bar.  Try to have fruits and/or vegetables at every meal. 
 · Make exercise part of your daily routine. You may want to start with simple activities, such as walking, bicycling, or slow swimming. Try to be active 30 to 60 minutes every day. You do not need to do all 30 to 60 minutes all at once. For example, you can exercise 3 times a day for 10 or 20 minutes. Moderate exercise is safe for most people, but it is always a good idea to talk to your doctor before starting an exercise program. 
· Keep moving. Andrea Garland the lawn, work in the garden, or [employer]. Take the stairs instead of the elevator at work. · If you smoke, quit. People who smoke have an increased risk for heart attack, stroke, cancer, and other lung illnesses. Quitting is hard, but there are ways to boost your chance of quitting tobacco for good. ¨ Use nicotine gum, patches, or lozenges. ¨ Ask your doctor about stop-smoking programs and medicines. ¨ Keep trying. In addition to reducing your risk of diseases in the future, you will notice some benefits soon after you stop using tobacco. If you have shortness of breath or asthma symptoms, they will likely get better within a few weeks after you quit. · Limit how much alcohol you drink. Moderate amounts of alcohol (up to 2 drinks a day for men, 1 drink a day for women) are okay. But drinking too much can lead to liver problems, high blood pressure, and other health problems. Family health If you have a family, there are many things you can do together to improve your health. · Eat meals together as a family as often as possible. · Eat healthy foods. This includes fruits, vegetables, lean meats and dairy, and whole grains. · Include your family in your fitness plan. Most people think of activities such as jogging or tennis as the way to fitness, but there are many ways you and your family can be more active. Anything that makes you breathe hard and gets your heart pumping is exercise. Here are some tips: ¨ Walk to do errands or to take your child to school or the bus. ¨ Go for a family bike ride after dinner instead of watching TV. Where can you learn more? Go to http://maine-edward.info/. Enter X880 in the search box to learn more about \"A Healthy Lifestyle: Care Instructions. \" Current as of: July 26, 2016 Content Version: 11.3 © 9487-6776 [employer]. Care instructions adapted under license by [employer] (which disclaims liability or warranty for this information). If you have questions about a medical condition or this instruction, always ask your healthcare professional. [address] any warranty or liability for your use of this information. Introducing [facility]! Indra Noel introduces [employer] patient portal. Now you can access parts of your medical record, email your doctor's office, and request medication refills online. 1. In your internet browser, go to [email or web address] 2. Click on the First Time User? Click Here link in the Sign In box. You will see the New Member Sign Up page. 3. Enter your [employer] Access Code exactly as it appears below. You will not need to use this code after youve completed the sign-up process. If you do not sign up before the expiration date, you must request a new code. · [employer] Access Code: [email or web address] Expires: 7/5/2017 12:32 PM 
 
4. Enter the last four digits of your Social Security Number (xxxx) and Date of Birth (mm/dd/yyyy) as indicated and click Submit. You will be taken to the next sign-up page. 5. Create a [employer] ID. This will be your [employer] login ID and cannot be changed, so think of one that is secure and easy to remember. 6. Create a [employer] password. You can change your password at any time. 7. Enter your Password Reset Question and Answer. This can be used at a later time if you forget your password. 8. Enter your e-mail address. You will receive e-mail notification when new information is available in [address] 9. Click Sign Up. You can now view and download portions of your medical record. 10. Click the Download Summary menu link to download a portable copy of your medical information. If you have questions, please visit the Frequently Asked Questions section of the [employer] website. Remember, [employer] is NOT to be used for urgent needs. For medical emergencies, dial 911. Now available from your iPhone and Android! Please provide this summary of care documentation to your next provider. Your primary care clinician is listed as Evens Goodman. If you have any questions after today's visit, please call [phone].

## 2017-06-27 NOTE — LETTER
6/27/2017 10:08 PM 
 
Patient:  Aicha Hodges YOB: 1935 Date of Visit: 6/27/2017 Dear No Recipients: Thank you for referring Ms. Caro Lawson to me for evaluation/treatment. Below are the relevant portions of my assessment and plan of care. Consult Subjective:  
 
Caro Lawson is a 80 y.o. right-handed  female seen for evaluation of spell last year of her right arm suddenly became hard to control and had some abnormal dyskinetic movements lasting several minutes, associated with a sensation of weakness and clumsiness. Patient's Dopplers were on remarkable, and she continues Plavix 75 mg once a day without recurrent symptoms. We will check her Dopplers next visit in one years time. She's had no recurrence since that time. She has had a previous stroke and has diffuse microvascular disease on her MRI scan done one year ago. Her EEG to rule out seizures in view of her past history of strokes was also normal.. She is to continue her Plavix which she is not taking for her cerebrovascular disease. Patient had recurrent difficulty swallowing and had another esophageal dilatation in January of this year and is swallowing fine. She denies any progression of weakness, denies any fasciculation, denies any muscle atrophy, and denies any other difficulty with swallowing or speaking. She has had no symptoms of ALS. Patient had a normal EMG showing no clear evidence of ALS. Patient's MRI scan just showed age related changes and microvascular disease typical for her age and no other lesions. Patient's carotid Dopplers were normal. All lab tests including myasthenic panel, CPK, and all other tests were negative except for positive test for anti-neuronal antibody which may be a false positive from her Sjogrens syndrome. Patient denies any new weakness or numbness or change in her voice.  Patient gives a 3 year history of gradually progressive trouble swallowing, with solids more than liquids. She has seen ENT Dr. 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 She had a modified barium swallow that showed moderate pharyngeal dysphagia and mild oral dysphagia, and now a possible stricture. She denies any weakness or sensory loss in her extremities, but does complaint of mild fatigability and generalized weakness. She has no double vision, headaches, sleep problems, gait problems, fever, trauma, or other precipitating causes. She has no family history of similar problems. She had a history of stroke in 1998 that was manifested by speech difficulty and visual loss which resolved after several years without recurrent symptoms. She has a history of Sjogrens syndrome and had her teeth removed because of complications from that. She has occasional choking, and probably had aspiration pneumonia at [facility] last february, but was never seen by neurology. Her bowel and bladder function is stable, and occasionally her knees feel wobbly. She has no major back pain, headache, or neck pain. Her voice has become softer. Patient denies muscle pain, atrophy or fasciculations or cramps. Past Medical History:  
Diagnosis Date  Arthropathy  Chronic pain   
 lupus  Depression  Essential hypertension  Fracture   
 back  GERD (gastroesophageal reflux disease)  Ill-defined condition 04/2017 pneumonia  Lupus ([address]  Osteoporosis  Pneumonia  Sjoegren syndrome ([address]  Stroke ([facility] [address] 1998  
 no residual weakness Past Surgical History:  
Procedure Laterality Date  HX BILATERAL SALPINGO-OOPHORECTOMY  2013 Dr. Anatoly Williamson  HX ENDOSCOPY    
 HX KYPHOPLASTY  02/2016 Pt states T12 and L1 Family History Problem Relation Age of Onset [facility] Other Mother NPH  Stroke Mother  Osteoporosis Mother  Stroke Father  Bipolar Disorder Father  Heart Disease Father  Bipolar Disorder Child Social History Substance Use Topics  Smoking status: Never Smoker  Smokeless tobacco: Never Used  Alcohol use 0.5 oz/week 1 Glasses of wine per week Current Outpatient Prescriptions:  
  lansoprazole (PREVACID) 30 mg disintegrating tablet, Take 30 mg by mouth daily. , Disp: , Rfl:  
  diclofenac (VOLTAREN) 1 % topical gel, Apply 4 g to affected area as needed. , Disp: , Rfl:  
  atorvastatin (LIPITOR) 40 mg tablet, Take 20 mg by mouth daily (after lunch). , Disp: , Rfl:  
  citalopram (CELEXA) 20 mg tablet, Take 20 mg by mouth daily (after lunch). , Disp: , Rfl:  
  clopidogrel (PLAVIX) 75 mg tablet, Take 75 mg by mouth daily (after lunch). , Disp: , Rfl:  
  hydroxychloroquine (PLAQUENIL) 200 mg tablet, Take 400 mg by mouth daily. , Disp: , Rfl:  
  metoprolol succinate (TOPROL-XL) 50 mg XL tablet, Take 25 mg by mouth daily (after lunch). , Disp: , Rfl:  
  spironolactone-hydrochlorothiazide (ALDACTAZIDE) 25-25 mg per tablet, Take 0.5 Tabs by mouth daily (after lunch). , Disp: , Rfl:  
 
 
 
Allergies Allergen Reactions  Imuran [Azathioprine] Other (comments) Lupus attack  Iodinated Contrast- Oral And Iv Dye Hives  Gadavist [Gadobutrol] Hives and Itching Scant hives to back post receiving injection of gadavist. Denied any breathing problems or throat swelling. Resolved within 30 minutes. MRI contrast Gadolinnium) Review of Systems: A comprehensive review of systems was negative except for: Constitutional: positive for fatigue and malaise Respiratory: positive for pneumonia or dyspnea on exertion Gastrointestinal: positive for dysphagia, dyspepsia and reflux symptoms Neurological: positive for weakness and dysphasia and dysarthria Behvioral/Psych: positive for anxiety and depression Vitals:  
 06/27/17 1323 BP: 116/70 Pulse: 74 SpO2: 98% Weight: 123 lb (55.8 kg) Height: 5' 5\" (1.651 m) Objective: I 
 
 
NEUROLOGICAL EXAM: 
 
Appearance: The patient is well developed, well nourished, provides a coherent history and is in no acute distress. Mental Status: Oriented to time, place and person, and the president, cognitive function is normal, speech minimally dysarthric but no aphasia. Mood and affect appropriate but anxious and depressed. Cranial Nerves:   Intact visual fields. Fundi are benign. SO, EOM's full, no nystagmus, no ptosis. Facial sensation is normal. Corneal reflexes are not tested. Facial movement is symmetric. Hearing is normal bilaterally. Palate is midline with normal sternocleidomastoid and trapezius muscles are normal. Tongue is midline, with no obvious fasciculations. Motor:  4/5 strength in upper and lower proximal and distal muscles. Normal bulk and tone. No fasciculations. Reflexes:   Deep tendon reflexes 1+/4 and symmetrical and slightly brisk. No babinski or clonus present Sensory:   Normal to touch, pinprick and vibration, and DSS. Gait:  Normal gait for age. Tremor:   No tremor noted. Cerebellar:  No cerebellar signs present. Neurovascular:  Normal heart sounds and regular rhythm, peripheral pulses decreased, and no carotid bruits. Assessment: ICD-10-CM ICD-9-CM 1. Stenosis of both carotid arteries without cerebral infarction I65.23 433.10   
  433.30 2. Transient ischemic attack involving left internal carotid artery G45.1 435.8 3. Abnormal involuntary movement, right arm R25.9 781.0 4. Localization-related partial epilepsy with complex partial seizures ([facility] [address] G40.209 345.40 5. Cerebral microvascular disease I67.9 437.9 Plan:  
Patient better after surgery for cricopharyngeal muscle spasms No evidence of ALS so far. She is to continue her Plavix therapy and call us if any change in call for results of her tests Patient with dysphagia and dysarthria and neuromuscular disease of the oral pharynx, without clear fasciculations or atrophy of her muscles or tongue. MRI and EMG and blood tests are all unremarkable except for a questionable anti-neuronal antibody Patient improved after an esophageal dilatation Patient encouraged to be very careful swallowing until we undergo this evaluation She will call if any problem in the interim, and followup will be arranged in 6 months time or earlier if needed 30 minutes spent with patient in detail exam, checking her carefully for any fasciculations upper extremities or tongue, and discussing her possible diagnosis, prognosis and treatment Signed By: Oswald Arechiga MD   
 June 27, 2017 This note will not be viewable in [address] If you have questions, please do not hesitate to call me. I look forward to following Ms. Lawson along with you. Sincerely, Oswald Arechiga MD

## 2017-06-27 NOTE — PATIENT INSTRUCTIONS

## 2017-06-28 NOTE — PROGRESS NOTES
Consult    Subjective:     Lin Lawson is a 80 y.o. right-handed  female seen for evaluation of spell last year of her right arm suddenly became hard to control and had some abnormal dyskinetic movements lasting several minutes, associated with a sensation of weakness and clumsiness. Patient's Dopplers were on remarkable, and she continues Plavix 75 mg once a day without recurrent symptoms. We will check her Dopplers next visit in one years time. She's had no recurrence since that time. She has had a previous stroke and has diffuse microvascular disease on her MRI scan done one year ago. Her EEG to rule out seizures in view of her past history of strokes was also normal.. She is to continue her Plavix which she is not taking for her cerebrovascular disease. Patient had recurrent difficulty swallowing and had another esophageal dilatation in January of this year and is swallowing fine. She denies any progression of weakness, denies any fasciculation, denies any muscle atrophy, and denies any other difficulty with swallowing or speaking. She has had no symptoms of ALS. Patient had a normal EMG showing no clear evidence of ALS. Patient's MRI scan just showed age related changes and microvascular disease typical for her age and no other lesions. Patient's carotid Dopplers were normal. All lab tests including myasthenic panel, CPK, and all other tests were negative except for positive test for anti-neuronal antibody which may be a false positive from her Sjogrens syndrome. Patient denies any new weakness or numbness or change in her voice. Patient gives a 3 year history of gradually progressive trouble swallowing, with solids more than liquids. She has seen ENT Dr. 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 She had a modified barium swallow that showed moderate pharyngeal dysphagia and mild oral dysphagia, and now a possible stricture.  She denies any weakness or sensory loss in her extremities, but does complaint of mild fatigability and generalized weakness. She has no double vision, headaches, sleep problems, gait problems, fever, trauma, or other precipitating causes. She has no family history of similar problems. She had a history of stroke in 1998 that was manifested by speech difficulty and visual loss which resolved after several years without recurrent symptoms. She has a history of Sjogrens syndrome and had her teeth removed because of complications from that. She has occasional choking, and probably had aspiration pneumonia at [facility] last february, but was never seen by neurology. Her bowel and bladder function is stable, and occasionally her knees feel wobbly. She has no major back pain, headache, or neck pain. Her voice has become softer. Patient denies muscle pain, atrophy or fasciculations or cramps.     Past Medical History:   Diagnosis Date    Arthropathy     Chronic pain     lupus    Depression     Essential hypertension     Fracture     back    GERD (gastroesophageal reflux disease)     Ill-defined condition 04/2017    pneumonia    Lupus ([address]     Osteoporosis     Pneumonia     Sjoegren syndrome ([address]     Stroke ([facility] [address] 1998    no residual weakness      Past Surgical History:   Procedure Laterality Date    HX BILATERAL SALPINGO-OOPHORECTOMY  2013    Dr. Ramiro Pascal HX KYPHOPLASTY  02/2016    Pt states T12 and L1     Family History   Problem Relation Age of Onset    Other Mother      NPH    Stroke Mother     Osteoporosis Mother     Stroke Father     Bipolar Disorder Father     Heart Disease Father     Bipolar Disorder Child       Social History   Substance Use Topics    Smoking status: Never Smoker    Smokeless tobacco: Never Used    Alcohol use 0.5 oz/week     1 Glasses of wine per week         Current Outpatient Prescriptions:     lansoprazole (PREVACID) 30 mg disintegrating tablet, Take 30 mg by mouth daily. , Disp: , Rfl:     diclofenac (VOLTAREN) 1 % topical gel, Apply 4 g to affected area as needed. , Disp: , Rfl:     atorvastatin (LIPITOR) 40 mg tablet, Take 20 mg by mouth daily (after lunch). , Disp: , Rfl:     citalopram (CELEXA) 20 mg tablet, Take 20 mg by mouth daily (after lunch). , Disp: , Rfl:     clopidogrel (PLAVIX) 75 mg tablet, Take 75 mg by mouth daily (after lunch). , Disp: , Rfl:     hydroxychloroquine (PLAQUENIL) 200 mg tablet, Take 400 mg by mouth daily. , Disp: , Rfl:     metoprolol succinate (TOPROL-XL) 50 mg XL tablet, Take 25 mg by mouth daily (after lunch). , Disp: , Rfl:     spironolactone-hydrochlorothiazide (ALDACTAZIDE) 25-25 mg per tablet, Take 0.5 Tabs by mouth daily (after lunch). , Disp: , Rfl:         Allergies   Allergen Reactions    Imuran [Azathioprine] Other (comments)     Lupus attack    Iodinated Contrast- Oral And Iv Dye Hives    Gadavist [Gadobutrol] Hives and Itching     Scant hives to back post receiving injection of gadavist. Denied any breathing problems or throat swelling. Resolved within 30 minutes. MRI contrast Gadolinnium)        Review of Systems:  A comprehensive review of systems was negative except for: Constitutional: positive for fatigue and malaise  Respiratory: positive for pneumonia or dyspnea on exertion  Gastrointestinal: positive for dysphagia, dyspepsia and reflux symptoms  Neurological: positive for weakness and dysphasia and dysarthria  Behvioral/Psych: positive for anxiety and depression   Vitals:    06/27/17 1323   BP: 116/70   Pulse: 74   SpO2: 98%   Weight: 123 lb (55.8 kg)   Height: 5' 5\" (1.651 m)     Objective:     I      NEUROLOGICAL EXAM:    Appearance: The patient is well developed, well nourished, provides a coherent history and is in no acute distress. Mental Status: Oriented to time, place and person, and the president, cognitive function is normal, speech minimally dysarthric but no aphasia.  Mood and affect appropriate but anxious and depressed. Cranial Nerves:   Intact visual fields. Fundi are benign. SO, EOM's full, no nystagmus, no ptosis. Facial sensation is normal. Corneal reflexes are not tested. Facial movement is symmetric. Hearing is normal bilaterally. Palate is midline with normal sternocleidomastoid and trapezius muscles are normal. Tongue is midline, with no obvious fasciculations. Motor:  4/5 strength in upper and lower proximal and distal muscles. Normal bulk and tone. No fasciculations. Reflexes:   Deep tendon reflexes 1+/4 and symmetrical and slightly brisk. No babinski or clonus present   Sensory:   Normal to touch, pinprick and vibration, and DSS. Gait:  Normal gait for age. Tremor:   No tremor noted. Cerebellar:  No cerebellar signs present. Neurovascular:  Normal heart sounds and regular rhythm, peripheral pulses decreased, and no carotid bruits. Assessment:       ICD-10-CM ICD-9-CM    1. Stenosis of both carotid arteries without cerebral infarction I65.23 433.10      433.30    2. Transient ischemic attack involving left internal carotid artery G45.1 435.8    3. Abnormal involuntary movement, right arm R25.9 781.0    4. Localization-related partial epilepsy with complex partial seizures ([facility] [address] G40.209 345.40    5. Cerebral microvascular disease I67.9 437.9          Plan:   Patient better after surgery for cricopharyngeal muscle spasms  No evidence of ALS so far. She is to continue her Plavix therapy and call us if any change in call for results of her tests  Patient with dysphagia and dysarthria and neuromuscular disease of the oral pharynx, without clear fasciculations or atrophy of her muscles or tongue.   MRI and EMG and blood tests are all unremarkable except for a questionable anti-neuronal antibody  Patient improved after an esophageal dilatation   Patient encouraged to be very careful swallowing until we undergo this evaluation  She will call if any problem in the interim, and followup will be arranged in 6 months time or earlier if needed  30 minutes spent with patient in detail exam, checking her carefully for any fasciculations upper extremities or tongue, and discussing her possible diagnosis, prognosis and treatment    Signed By: Deniz Zelaya MD     June 27, 2017       This note will not be viewable in [address]

## 2017-07-05 ENCOUNTER — HOSPITAL ENCOUNTER (OUTPATIENT)
Dept: CT IMAGING | Age: 82
Discharge: HOME | End: 2017-07-05
Attending: PHYSICIAN ASSISTANT
Payer: MEDICARE

## 2017-07-05 DIAGNOSIS — R93.89 ABNORMAL RADIOLOGICAL FINDINGS IN SKIN AND SUBCUTANEOUS TISSUE: ICD-10-CM

## 2017-07-05 PROCEDURE — 71250 CT THORAX DX C-: CPT

## 2017-08-29 ENCOUNTER — HOSPITAL ENCOUNTER (OUTPATIENT)
Dept: NUTRITION | Age: 82
Discharge: HOME | End: 2017-08-29
Payer: MEDICARE

## 2017-08-29 DIAGNOSIS — J18.9 PNEUMONIA: ICD-10-CM

## 2017-08-29 PROCEDURE — 97802 MEDICAL NUTRITION INDIV IN: CPT | Performed by: DIETITIAN, REGISTERED

## 2017-08-29 NOTE — PROGRESS NOTES
[facility] Assessment - Initial Nutrition Evaluation   DATE: 2017      REFERRING PHYSICIAN: Dr. Brenton Carroll (per pt)  NAME: Caro Lawson : 1935 AGE: 80 y.o. GENDER: female  REASON FOR VISIT: Swallowing difficulties (hx aspiration pneumonia)    Past Medical Hx: stroke, sjogrens, swallowing difficulties, HTN, depression, GERD    LABS:   No results found for: HBA1C, HGBE8, PFS0AYAG, IPO3MZNS    MEDICATIONS/SUPPLEMENTS:   [unfilled]  Prior to Admission medications    Medication Sig Start Date End Date Taking? Authorizing Provider   lansoprazole (PREVACID) 30 mg disintegrating tablet Take 30 mg by mouth daily. Historical Provider   diclofenac (VOLTAREN) 1 % topical gel Apply 4 g to affected area as needed. Historical Provider   atorvastatin (LIPITOR) 40 mg tablet Take 20 mg by mouth daily (after lunch). 14   Historical Provider   citalopram (CELEXA) 20 mg tablet Take 20 mg by mouth daily (after lunch). 2/23/15   Historical Provider   clopidogrel (PLAVIX) 75 mg tablet Take 75 mg by mouth daily (after lunch). 3/20/15   Historical Provider   hydroxychloroquine (PLAQUENIL) 200 mg tablet Take 400 mg by mouth daily. 2/5/15   Historical Provider   metoprolol succinate (TOPROL-XL) 50 mg XL tablet Take 25 mg by mouth daily (after lunch). 14   Historical Provider   spironolactone-hydrochlorothiazide (ALDACTAZIDE) 25-25 mg per tablet Take 0.5 Tabs by mouth daily (after lunch).  14   Historical Provider       FOOD ALLERGIES/INTOLERANCES: None    ANTHROPOMETRICS:    Ht Readings from Last 1 Encounters:   17 5' 5\" (1.651 m)      Wt Readings from Last 1 Encounters:   17 55.8 kg (123 lb)   Today's wt: 126 lbs (57.3 kg) - pt stated  IBW:125 # +/- 10%  %IBW: 100 % +/- 10%    BMI: 21.02 Category: Normal weight    Reported Wt Hx:  Pt reports 10-14 lb wt loss since May, although has gained back 3 lbs recently; -135 lbs    Estimated Nutritional Needs:   6584-6437 kcal/day (MSJ x 1.2-1.3 +250) to promote wt gain of .5 lb per week; 63-69g protein/day (1.1-1.2g/kg)    Reported Diet Hx:  Pt reports eating pureed diet (3 meals a day) for past several months due to swallowing issues; occasionally thickens liquids to honey consistency. 24 Hour Diet Recall  Breakfast  Smoothie- Greek yogurt, fruit, milk, 1 scoop protein powder (20g scoop), toast- 2 pcs with butter/honey   Lunch  Soup - chicken, veggies or chili w/ground beef pureed   Dinner  Same as lunch   Snacks  Ice cream or frozen yogurt   Beverages  Tea, whole milk     Exercise/Physical Actvity:  Pt walks dog once/week; daily activities in yard and in home    Environmental/Social:  Pt lives alone, cooks own meals; has 4 children      NUTRITION DIAGNOSIS:  Swallowing difficulty r/t cricopharyngeal muscle spasm lesions as evidenced by moderate to mild oral dysphagic resulting in choking on solid food    NUTRITION ASSESSMENT /INTERVENTION:  Pt seen for swallowing difficulties; pt states over past year several bouts with pneumonia- possible aspiration pneumonia last Feb. Pt with hx of Sjorens syndrome and has had throat stretched a couple times with some improvement noted. Pt notes wt loss over past several months (10-14 lbs) with some wt gain since starting pureed foods. Pt states physician discussed feeding tube but pt desires to eat by mouth as long as possible; honey thickened liquids were also recommended (by \"therapy\"- per pt). Reviewed pt's current po intake; is pureeing all foods except toast at bfast and drinking unthickened liquids- discussed the risks of aspiration associated with solid food and thin liquids. Recommended pt discontinue eating solid foods (bread) and only consume pureed foods with honey thickened liquids (as previously recommended, per \"therapy\") to reduce risk of aspiration. Also discussed importance of adequate protein intake.  Protein intake variable depending on whether protein is added to lunch and dinner meals- pt sometimes only pureeing vegetables (no protein). Recommended protein with each meal aiming for 65g protein/day; reviewed portion sizes of protein for better understanding. Also recommended pt add bright colored vegetables to pureed foods to increase vitamin/mineral content. Pt states physician recommended walking daily; pt currently walking dog once a week. PATIENT GOALS:    1. Increase intake of protein to ~65g/day (protein at all meals); add brightly colored vegetables, to include green leafy vegetables to meals (pureed)    2. Pureed foods only for meals; honey thickened liquids    3. Walk 5 days a week, as tolerated. Specific tips and techniques to facilitate compliance with above recommendations were provided and discussed. Pt was strongly encourage to begin making necessary changes now, and re-visit the dietitian prn. If further details are desired please feel free to contact me at [phone]. This phone number was also provided to the patient for any further questions or concerns.             Bonita Toledo RD

## 2018-08-29 ENCOUNTER — OFFICE VISIT (OUTPATIENT)
Dept: NEUROLOGY | Age: 83
End: 2018-08-29

## 2018-08-29 VITALS
TEMPERATURE: 98 F | RESPIRATION RATE: 12 BRPM | SYSTOLIC BLOOD PRESSURE: 116 MMHG | BODY MASS INDEX: 19.16 KG/M2 | OXYGEN SATURATION: 98 % | DIASTOLIC BLOOD PRESSURE: 68 MMHG | WEIGHT: 115 LBS | HEART RATE: 74 BPM | HEIGHT: 65 IN

## 2018-08-29 DIAGNOSIS — I67.89 CEREBRAL MICROVASCULAR DISEASE: Primary | ICD-10-CM

## 2018-08-29 DIAGNOSIS — G72.9 MYOPATHY: ICD-10-CM

## 2018-08-29 DIAGNOSIS — G40.209 LOCALIZATION-RELATED PARTIAL EPILEPSY WITH COMPLEX PARTIAL SEIZURES (HCC): ICD-10-CM

## 2018-08-29 DIAGNOSIS — R13.12 OROPHARYNGEAL DYSPHAGIA: ICD-10-CM

## 2018-08-29 DIAGNOSIS — I69.322 DYSARTHRIA DUE TO RECENT STROKE: ICD-10-CM

## 2018-08-29 DIAGNOSIS — I65.23 STENOSIS OF BOTH CAROTID ARTERIES WITHOUT CEREBRAL INFARCTION: ICD-10-CM

## 2018-08-29 DIAGNOSIS — G45.1 TRANSIENT ISCHEMIC ATTACK INVOLVING LEFT INTERNAL CAROTID ARTERY: ICD-10-CM

## 2018-08-29 NOTE — LETTER
8/29/2018 9:03 PM 
 
Patient:  Wes Guevara YOB: 1935 Date of Visit: 8/29/2018 Dear No Recipients: Thank you for referring Ms. Caro Lawson to me for evaluation/treatment. Below are the relevant portions of my assessment and plan of care. Consult Subjective:  
 
Caro Lawson is a 80 y.o. right-handed  female seen for evaluation at the request of Dr. Carloz Henley of new problem of increasing difficulty swallowing, to the point the patient is now on honey thickened liquids or swallowing difficulty, and still losing weight some because she cannot get enough food down. She might have slight hoarseness in her voice in addition. She has been dilated 3 times and had Botox several times, with only transient improvement in her swallowing ability. She had a negative neurologic workup for neuromuscular disease as a cause of her symptoms, but treating her esophageal stricture does not seem to be helping all that much. On exam again she does not have any clear atrophy or fasciculations of her tongue to suggest motor neuron disease and no systemic weakness, and no fasciculations or hyperreflexia. She had no other bulbar weakness. She had normal CPK enzymes also in the past and myasthenia test negative. Her symptoms are persistent not intermittent like neuromuscular junction disease. She has had no other recurrent neurological TIAs or strokes, and has not had a Doppler in 2 years, so one was ordered for tomorrow, she is to continue her antiplatelet therapy of Plavix. Her stroke was a spell in 2016 of her right arm suddenly became hard to control and had some abnormal dyskinetic movements lasting several minutes, associated with a sensation of weakness and clumsiness. Patient's Dopplers were on remarkable, and she continues Plavix 75 mg once a day without recurrent symptoms.   She has had a previous stroke and has diffuse microvascular disease on her MRI scan done one year ago. Her EEG to rule out seizures in view of her past history of strokes was also normal. She denies any progression of weakness, denies any fasciculation, denies any muscle atrophy, and denies any other difficulty with speaking. She has had no symptoms of ALS. Patient had a normal EMG showing no clear evidence of ALS. Patient's MRI scan just showed age related changes and microvascular disease typical for her age and no other lesions. Patient's carotid Dopplers were normal. All lab tests including myasthenic panel, CPK, and all other tests were negative except for positive test for anti-neuronal antibody which may be a false positive from her Sjogrens syndrome and known lupus. Patient gives a 4 year history of gradually progressive trouble swallowing, with solids more than liquids. She has seen ENT Dr. 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 She had a modified barium swallow that showed moderate pharyngeal dysphagia and mild oral dysphagia, and now a possible stricture. She denies any weakness or sensory loss in her extremities, but does complaint of mild fatigability and generalized weakness. She has no double vision, headaches, sleep problems, gait problems, fever, trauma, or other precipitating causes. She has no family history of similar problems. She had a history of stroke in 1998 that was manifested by speech difficulty and visual loss which resolved after several years without recurrent symptoms. She has a history of Sjogrens syndrome and had her teeth removed because of complications from that. She has occasional choking, and probably had aspiration pneumonia at [facility] last february, but was never seen by neurology. Her bowel and bladder function is stable, and occasionally her knees feel wobbly. She has no major back pain, headache, or neck pain.  Her voice has become softer. Patient denies muscle pain, atrophy or fasciculations or cramps. Past Medical History:  
Diagnosis Date  Arthropathy  Chronic pain   
 lupus  Depression  Essential hypertension  Fracture   
 back  GERD (gastroesophageal reflux disease)  Ill-defined condition 04/2017 pneumonia  Lupus  Osteoporosis  Pneumonia  [facility]joegren syndrome ([facility] [address] [facility]  Stroke ([facility] [address] [facility] 1998  
 no residual weakness Past Surgical History:  
Procedure Laterality Date  HX BILATERAL SALPINGO-OOPHORECTOMY  2013 Dr. Manjula Enriquez  HX ENDOSCOPY    
 HX KYPHOPLASTY  02/2016 Pt states T12 and L1 Family History Problem Relation Age of Onset Winsome Jerome Other Mother NPH  Stroke Mother  Osteoporosis Mother  Stroke Father  Bipolar Disorder Father  Heart Disease Father  Bipolar Disorder Child Social History Substance Use Topics  Smoking status: Never Smoker  Smokeless tobacco: Never Used  Alcohol use 0.5 oz/week 1 Glasses of wine per week Current Outpatient Prescriptions:  
  lansoprazole (PREVACID) 30 mg disintegrating tablet, Take 30 mg by mouth daily. , Disp: , Rfl:  
  diclofenac (VOLTAREN) 1 % topical gel, Apply 4 g to affected area as needed. , Disp: , Rfl:  
  atorvastatin (LIPITOR) 40 mg tablet, Take 20 mg by mouth daily (after lunch). , Disp: , Rfl:  
  citalopram (CELEXA) 20 mg tablet, Take 20 mg by mouth daily (after lunch). , Disp: , Rfl:  
  clopidogrel (PLAVIX) 75 mg tablet, Take 75 mg by mouth daily (after lunch). , Disp: , Rfl:  
  hydroxychloroquine (PLAQUENIL) 200 mg tablet, Take 400 mg by mouth daily. , Disp: , Rfl:  
  metoprolol succinate (TOPROL-XL) 50 mg XL tablet, Take 25 mg by mouth daily (after lunch). , Disp: , Rfl:  
  spironolactone-hydrochlorothiazide (ALDACTAZIDE) 25-25 mg per tablet, Take 0.5 Tabs by mouth daily (after lunch). , Disp: , Rfl:  
 
 
 
Allergies Allergen Reactions  Imuran [Azathioprine] Other (comments) Lupus attack  Iodinated Contrast- Oral And Iv Dye Hives  Gadavist [Gadobutrol] Hives and Itching Scant hives to back post receiving injection of gadavist. Denied any breathing problems or throat swelling. Resolved within 30 minutes. MRI contrast Gadolinnium) Review of Systems: A comprehensive review of systems was negative except for: Constitutional: positive for fatigue and malaise Respiratory: positive for pneumonia or dyspnea on exertion Gastrointestinal: positive for dysphagia, dyspepsia and reflux symptoms Neurological: positive for weakness and dysphasia and dysarthria Behvioral/Psych: positive for anxiety and depression Vitals:  
 08/29/18 1513 BP: 116/68 Pulse: 74 Resp: 12 Temp: 98 °F (36.7 °C) SpO2: 98% Weight: 115 lb (52.2 kg) Height: 5' 5\" (1.651 m) Objective: I 
 
 
NEUROLOGICAL EXAM: 
 
Appearance: The patient is well developed, well nourished, provides a coherent history and is in no acute distress. Mental Status: Oriented to time, place and person, and the president, cognitive function is normal, speech minimally dysarthric but no aphasia. Mood and affect appropriate but anxious and depressed. Cranial Nerves:   Intact visual fields. Fundi are benign. SO, EOM's full, no nystagmus, no ptosis. Facial sensation is normal. Corneal reflexes are not tested. Facial movement is symmetric. Hearing is normal bilaterally. Palate is midline with normal sternocleidomastoid and trapezius muscles are normal. Tongue is midline, with no obvious fasciculations. Neck is supple without meningismus or bruits Temporal arteries are not tender or enlarged Motor:  4/5 strength in upper and lower proximal and distal muscles. Normal bulk and tone. No fasciculations. Reflexes:   Deep tendon reflexes 1+/4 and symmetrical and slightly brisk. No babinski or clonus present Sensory:   Normal to touch, pinprick and vibration, and DSS. Gait:  Normal gait for age. Tremor:   No tremor noted. Cerebellar:  No cerebellar signs present. Neurovascular:  Normal heart sounds and regular rhythm, peripheral pulses decreased, and no carotid bruits. Assessment: ICD-10-CM ICD-9-CM 1. Cerebral microvascular disease I67.9 437.9 DUPLEX CAROTID BILATERAL AMB NEURO 2. 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 Stenosis of both carotid arteries without cerebral infarction I65.23 433.10 DUPLEX CAROTID BILATERAL AMB NEURO  
  433.30   
4. Dysarthria due to recent stroke I69.322 438.13 DUPLEX CAROTID BILATERAL AMB NEURO 5. Transient ischemic attack involving left internal carotid artery G45.1 435.8 DUPLEX CAROTID BILATERAL AMB NEURO 6. Oropharyngeal dysphagia R13.12 787.22 DUPLEX CAROTID BILATERAL AMB NEURO 7. Myopathy G72.9 359.9 DUPLEX CAROTID BILATERAL AMB NEURO Plan:  
 
Patient with progressive difficulty swallowing over the last year, but still no signs of motor neuron disease or any neuromuscular disease that I can see at this time. She has no atrophy or fasciculations of her tongue, no other bulbar weakness, and no systemic weakness or fasciculations to suggest motor neuron disease or neuromuscular junction disease She has had no more strokelike symptoms, and will get another carotid Doppler since his been 2 years since her last Doppler will continue her Plavix. Patient has had surgery for cricopharyngeal muscle spasms at times and several Botox injections without improvement in her symptoms but only transitory improvement No evidence of ALS so far. She is to continue her Plavix therapy and call us if any change in call for results of her tests Patient with dysphagia and dysarthria and neuromuscular disease of the oral pharynx, without clear fasciculations or atrophy of her muscles or tongue. MRI and EMG and blood tests are all unremarkable except for a questionable anti-neuronal antibody probably related to lupus Patient encouraged to be very careful swallowing until we undergo this evaluation She will call if any problem in the interim, and followup will be arranged in 6 months time or earlier if needed 35 minutes spent with patient in detail exam, checking her carefully for any fasciculations upper extremities or tongue, and discussing her possible diagnosis, prognosis and treatment Signed By: Sandor Boston MD   
 August 29, 2018 This note will not be viewable in [address] If you have questions, please do not hesitate to call me. I look forward to following Ms. Lawson along with you. Sincerely, Sandor Boston MD

## 2018-08-29 NOTE — MR AVS SNAPSHOT
[address], 
[id number], [address] [place] [id number] Patient: Karen Jones MRN: [id number] :1935 Visit Information Date & Time Provider Department Dept. Phone Encounter #  
 2018  3:00 PM Suhas Shipley MD [facility] [phone] [id number] Follow-up Instructions Return in about 1 year (around 2019). Upcoming Health Maintenance Date Due HEMOGLOBIN A1C Q6M 1935 LIPID PANEL Q1 1935 FOOT EXAM Q1 10/23/1945 MICROALBUMIN Q1 10/23/1945 EYE EXAM RETINAL OR DILATED Q1 10/23/1945 DTaP/Tdap/Td series (1 - Tdap) 10/23/1956 ZOSTER VACCINE AGE 60> 1995 GLAUCOMA SCREENING Q2Y 10/23/2000 Pneumococcal 65+ Low/Medium Risk (1 of 2 - PCV13) 10/23/2000 MEDICARE YEARLY EXAM 3/14/2018 Influenza Age 5 to Adult 2018 Allergies as of 2018  Review Complete On: 2018 By: Suhas Shipley MD  
  
 Severity Noted Reaction Type Reactions Imuran [Azathioprine]  2015    Other (comments) Lupus attack Iodinated Contrast- Oral And Iv Dye  2016    Hives Gadavist [Gadobutrol] Low 2015    Hives, Itching Scant hives to back post receiving injection of gadavist. Denied any breathing problems or throat swelling. Resolved within 30 minutes. MRI contrast Gadolinnium) Current Immunizations  Never Reviewed No immunizations on file. Not reviewed this visit You Were Diagnosed With   
  
 Codes Comments Cerebral microvascular disease    -  Primary ICD-10-CM: I67.9 ICD-9-CM: 437.9 Localization-related partial epilepsy with complex partial seizures ([facility] [address]     ICD-10-CM: I79.507 ICD-9-CM: 345.40 Stenosis of both carotid arteries without cerebral infarction     ICD-10-CM: I65.23 ICD-9-CM: 433.10, 433.30  Dysarthria due to recent stroke     ICD-10-CM: I69.322 
 ICD-9-CM: 438.13 Transient ischemic attack involving left internal carotid artery     ICD-10-CM: G45.1 ICD-9-CM: 435.8 Oropharyngeal dysphagia     ICD-10-CM: R13.12 
ICD-9-CM: 787.22 Myopathy     ICD-10-CM: G72.9 ICD-9-CM: 359.9 Vitals BP Pulse Temp Resp Height(growth percentile) Weight(growth percentile) 116/68 74 98 °F (36.7 °C) 12 5' 5\" (1.651 m) 115 lb (52.2 kg) SpO2 BMI OB Status Smoking Status 98% 19.14 kg/m2 Postmenopausal Never Smoker Vitals History BMI and BSA Data Body Mass Index Body Surface Area  
 19.14 kg/m 2 1.55 m 2 Preferred Pharmacy Pharmacy Name Phone [address] [facility] [place] [phone] Your Updated Medication List  
  
   
This list is accurate as of 8/29/18  3:40 PM.  Always use your most recent med list.  
  
  
  
  
 atorvastatin 40 mg tablet Commonly known as:  LIPITOR Take 20 mg by mouth daily (after lunch). citalopram 20 mg tablet Commonly known as:  Emerita Frieze Take 20 mg by mouth daily (after lunch). clopidogrel 75 mg Tab Commonly known as:  PLAVIX Take 75 mg by mouth daily (after lunch). diclofenac 1 % Gel Commonly known as:  VOLTAREN Apply 4 g to affected area as needed. hydroxychloroquine 200 mg tablet Commonly known as:  PLAQUENIL Take 400 mg by mouth daily. metoprolol succinate 50 mg XL tablet Commonly known as:  TOPROL-XL Take 25 mg by mouth daily (after lunch). Prevacid 30 mg disintegrating tablet Generic drug:  lansoprazole Take 30 mg by mouth daily. spironolactone-hydrochlorothiazide 25-25 mg per tablet Commonly known as:  ALDACTAZIDE Take 0.5 Tabs by mouth daily (after lunch). Follow-up Instructions Return in about 1 year (around 8/29/2019). To-Do List   
 08/30/2018 Imaging:  DUPLEX CAROTID BILATERAL AMB NEURO Patient Instructions Office Policies o Phone calls/patient messages: Please allow up to 24 hours for someone in the office to contact you about your call or message. Be mindful your provider may be out of the office or your message may require further review. We encourage you to use [employer]over for your messages as this is a faster, more efficient way to communicate with our office 
 
o Medication Refills: 
Prescription medications require up to 48 business hours to process. We encourage you to use [employer] for your refills. For controlled medications: Please allow up to 72 business hours to process. Certain medications may require you to  a written prescription at our office. NO narcotic/controlled medications will be prescribed after 4pm Monday through Friday or on weekends 
 
o Form/Paperwork Completion: 
Please note there is a $25 fee for all paperwork completed by our providers. We ask that you allow 7-14 business days. Pre-payment is due prior to picking up/faxing the completed form. You may also download your forms to [employer] to have your doctor print off. A Healthy Lifestyle: Care Instructions Your Care Instructions A healthy lifestyle can help you feel good, stay at a healthy weight, and have plenty of energy for both work and play. A healthy lifestyle is something you can share with your whole family. A healthy lifestyle also can lower your risk for serious health problems, such as high blood pressure, heart disease, and diabetes. You can follow a few steps listed below to improve your health and the health of your family. Follow-up care is a key part of your treatment and safety. Be sure to make and go to all appointments, and call your doctor if you are having problems. It's also a good idea to know your test results and keep a list of the medicines you take. How can you care for yourself at home? · Do not eat too much sugar, fat, or fast foods.  You can still have dessert and treats now and then. The goal is moderation. · Start small to improve your eating habits. Pay attention to portion sizes, drink less juice and soda pop, and eat more fruits and vegetables. ¨ Eat a healthy amount of food. A 3-ounce serving of meat, for example, is about the size of a deck of cards. Fill the rest of your plate with vegetables and whole grains. ¨ Limit the amount of soda and sports drinks you have every day. Drink more water when you are thirsty. ¨ Eat at least 5 servings of fruits and vegetables every day. It may seem like a lot, but it is not hard to reach this goal. A serving or helping is 1 piece of fruit, 1 cup of vegetables, or 2 cups of leafy, raw vegetables. Have an apple or some carrot sticks as an afternoon snack instead of a candy bar. Try to have fruits and/or vegetables at every meal. 
· Make exercise part of your daily routine. You may want to start with simple activities, such as walking, bicycling, or slow swimming. Try to be active 30 to 60 minutes every day. You do not need to do all 30 to 60 minutes all at once. For example, you can exercise 3 times a day for 10 or 20 minutes. Moderate exercise is safe for most people, but it is always a good idea to talk to your doctor before starting an exercise program. 
· Keep moving. Alva Barbara the lawn, work in the garden, or [employer]. Take the stairs instead of the elevator at work. · If you smoke, quit. People who smoke have an increased risk for heart attack, stroke, cancer, and other lung illnesses. Quitting is hard, but there are ways to boost your chance of quitting tobacco for good. ¨ Use nicotine gum, patches, or lozenges. ¨ Ask your doctor about stop-smoking programs and medicines. ¨ Keep trying.  
In addition to reducing your risk of diseases in the future, you will notice some benefits soon after you stop using tobacco. If you have shortness of breath or asthma symptoms, they will likely get better within a few weeks after you quit. · Limit how much alcohol you drink. Moderate amounts of alcohol (up to 2 drinks a day for men, 1 drink a day for women) are okay. But drinking too much can lead to liver problems, high blood pressure, and other health problems. Family health If you have a family, there are many things you can do together to improve your health. · Eat meals together as a family as often as possible. · Eat healthy foods. This includes fruits, vegetables, lean meats and dairy, and whole grains. · Include your family in your fitness plan. Most people think of activities such as jogging or tennis as the way to fitness, but there are many ways you and your family can be more active. Anything that makes you breathe hard and gets your heart pumping is exercise. Here are some tips: 
¨ Walk to do errands or to take your child to school or the bus. ¨ Go for a family bike ride after dinner instead of watching TV. Where can you learn more? Go to http://maine-edward.info/. Enter H858 in the search box to learn more about \"A Healthy Lifestyle: Care Instructions. \" Current as of: December 7, 2017 Content Version: 11.7 © 4993-5283 [employer]. Care instructions adapted under license by Software 2000 (which disclaims liability or warranty for this information). If you have questions about a medical condition or this instruction, always ask your healthcare professional. [place] [address] any warranty or liability for your use of this information. Introducing \[facility] [place] [facility]\"" & HEALTH SERVICES! [employer] introduces [employer] patient portal. Now you can access parts of your medical record, email your doctor's office, and request medication refills online. 1. In your internet browser, go to [email or web address] 2. Click on the First Time User? Click Here link in the Sign In box. You will see the New Member Sign Up page. 3. Enter your LiveBid Access Code exactly as it appears below. You will not need to use this code after youve completed the sign-up process. If you do not sign up before the expiration date, you must request a new code. · Live[employer] Access Code: [email or web address] Expires: 11/27/2018  3:14 PM 
 
4. Enter the last four digits of your Social Security Number (xxxx) and Date of Birth (mm/dd/yyyy) as indicated and click Submit. You will be taken to the next sign-up page. 5. Create a [employer] Diagnostic[employer] ID. This will be your LiveBid login ID and cannot be changed, so think of one that is secure and easy to remember. 6. Create a LiveBid password. You can change your password at any time. 7. Enter your Password Reset Question and Answer. This can be used at a later time if you forget your password. 8. Enter your e-mail address. You will receive e-mail notification when new information is available in [address] 9. Click Sign Up. You can now view and download portions of your medical record. 10. Click the Download Summary menu link to download a portable copy of your medical information. If you have questions, please visit the Frequently Asked Questions section of the Live[employer] website. Remember, LiveBid is NOT to be used for urgent needs. For medical emergencies, dial 911. Now available from your iPhone and Android! Please provide this summary of care documentation to your next provider. Your primary care clinician is listed as Andree De Leon. If you have any questions after today's visit, please call [phone].

## 2018-08-29 NOTE — PATIENT INSTRUCTIONS
Office Policies    o Phone calls/patient messages:  Please allow up to 24 hours for someone in the office to contact you about your call or message. Be mindful your provider may be out of the office or your message may require further review. We encourage you to use [employer] for your messages as this is a faster, more efficient way to communicate with our office    o Medication Refills:  Prescription medications require up to 48 business hours to process. We encourage you to use [employer] for your refills. For controlled medications: Please allow up to 72 business hours to process. Certain medications may require you to  a written prescription at our office. NO narcotic/controlled medications will be prescribed after 4pm Monday through Friday or on weekends    o Form/Paperwork Completion:  Please note there is a $25 fee for all paperwork completed by our providers. We ask that you allow 7-14 business days. Pre-payment is due prior to picking up/faxing the completed form. You may also download your forms to [employer] to have your doctor print off. A Healthy Lifestyle: Care Instructions  Your Care Instructions    A healthy lifestyle can help you feel good, stay at a healthy weight, and have plenty of energy for both work and play. A healthy lifestyle is something you can share with your whole family. A healthy lifestyle also can lower your risk for serious health problems, such as high blood pressure, heart disease, and diabetes. You can follow a few steps listed below to improve your health and the health of your family. Follow-up care is a key part of your treatment and safety. Be sure to make and go to all appointments, and call your doctor if you are having problems. It's also a good idea to know your test results and keep a list of the medicines you take. How can you care for yourself at home? · Do not eat too much sugar, fat, or fast foods. You can still have dessert and treats now and then. The goal is moderation. · Start small to improve your eating habits. Pay attention to portion sizes, drink less juice and soda pop, and eat more fruits and vegetables. ¨ Eat a healthy amount of food. A 3-ounce serving of meat, for example, is about the size of a deck of cards. Fill the rest of your plate with vegetables and whole grains. ¨ Limit the amount of soda and sports drinks you have every day. Drink more water when you are thirsty. ¨ Eat at least 5 servings of fruits and vegetables every day. It may seem like a lot, but it is not hard to reach this goal. A serving or helping is 1 piece of fruit, 1 cup of vegetables, or 2 cups of leafy, raw vegetables. Have an apple or some carrot sticks as an afternoon snack instead of a candy bar. Try to have fruits and/or vegetables at every meal.  · Make exercise part of your daily routine. You may want to start with simple activities, such as walking, bicycling, or slow swimming. Try to be active 30 to 60 minutes every day. You do not need to do all 30 to 60 minutes all at once. For example, you can exercise 3 times a day for 10 or 20 minutes. Moderate exercise is safe for most people, but it is always a good idea to talk to your doctor before starting an exercise program.  · Keep moving. [employer] the lawn, work in the garden, or [employer]. Take the stairs instead of the elevator at work. · If you smoke, quit. People who smoke have an increased risk for heart attack, stroke, cancer, and other lung illnesses. Quitting is hard, but there are ways to boost your chance of quitting tobacco for good. ¨ Use nicotine gum, patches, or lozenges. ¨ Ask your doctor about stop-smoking programs and medicines. ¨ Keep trying. In addition to reducing your risk of diseases in the future, you will notice some benefits soon after you stop using tobacco. If you have shortness of breath or asthma symptoms, they will likely get better within a few weeks after you quit.   · Limit how much alcohol you drink. Moderate amounts of alcohol (up to 2 drinks a day for men, 1 drink a day for women) are okay. But drinking too much can lead to liver problems, high blood pressure, and other health problems. Family health  If you have a family, there are many things you can do together to improve your health. · Eat meals together as a family as often as possible. · Eat healthy foods. This includes fruits, vegetables, lean meats and dairy, and whole grains. · Include your family in your fitness plan. Most people think of activities such as jogging or tennis as the way to fitness, but there are many ways you and your family can be more active. Anything that makes you breathe hard and gets your heart pumping is exercise. Here are some tips:  ¨ Walk to do errands or to take your child to school or the bus. ¨ Go for a family bike ride after dinner instead of watching TV. Where can you learn more? Go to http://m[place]-edward.info/. Enter Q014 in the search box to learn more about \"A Healthy Lifestyle: Care Instructions. \"  Current as of: December 7, 2017  Content Version: 11.7  © 4056-8687 [employer]. Care instructions adapted under license by [employer] (which disclaims liability or warranty for this information). If you have questions about a medical condition or this instruction, always ask your healthcare professional. [address] any warranty or liability for your use of this information.

## 2018-08-30 ENCOUNTER — OFFICE VISIT (OUTPATIENT)
Dept: NEUROLOGY | Age: 83
End: 2018-08-30

## 2018-08-30 DIAGNOSIS — R13.12 OROPHARYNGEAL DYSPHAGIA: ICD-10-CM

## 2018-08-30 DIAGNOSIS — I65.23 STENOSIS OF BOTH CAROTID ARTERIES WITHOUT CEREBRAL INFARCTION: ICD-10-CM

## 2018-08-30 DIAGNOSIS — G40.209 LOCALIZATION-RELATED PARTIAL EPILEPSY WITH COMPLEX PARTIAL SEIZURES (HCC): ICD-10-CM

## 2018-08-30 DIAGNOSIS — G72.9 MYOPATHY: ICD-10-CM

## 2018-08-30 DIAGNOSIS — I67.89 CEREBRAL MICROVASCULAR DISEASE: Primary | ICD-10-CM

## 2018-08-30 DIAGNOSIS — I69.322 DYSARTHRIA DUE TO RECENT STROKE: ICD-10-CM

## 2018-08-30 DIAGNOSIS — G45.1 TRANSIENT ISCHEMIC ATTACK INVOLVING LEFT INTERNAL CAROTID ARTERY: ICD-10-CM

## 2018-08-30 NOTE — PROGRESS NOTES
Consult    Subjective:     Cristian Lawson is a 80 y.o. right-handed  female seen for evaluation at the request of Dr. Valdemar Clay of new problem of increasing difficulty swallowing, to the point the patient is now on honey thickened liquids or swallowing difficulty, and still losing weight some because she cannot get enough food down. She might have slight hoarseness in her voice in addition. She has been dilated 3 times and had Botox several times, with only transient improvement in her swallowing ability. She had a negative neurologic workup for neuromuscular disease as a cause of her symptoms, but treating her esophageal stricture does not seem to be helping all that much. On exam again she does not have any clear atrophy or fasciculations of her tongue to suggest motor neuron disease and no systemic weakness, and no fasciculations or hyperreflexia. She had no other bulbar weakness. She had normal CPK enzymes also in the past and myasthenia test negative. Her symptoms are persistent not intermittent like neuromuscular junction disease. She has had no other recurrent neurological TIAs or strokes, and has not had a Doppler in 2 years, so one was ordered for tomorrow, she is to continue her antiplatelet therapy of Plavix. Her stroke was a spell in 2016 of her right arm suddenly became hard to control and had some abnormal dyskinetic movements lasting several minutes, associated with a sensation of weakness and clumsiness. Patient's Dopplers were on remarkable, and she continues Plavix 75 mg once a day without recurrent symptoms. She has had a previous stroke and has diffuse microvascular disease on her MRI scan done one year ago. Her EEG to rule out seizures in view of her past history of strokes was also normal. She denies any progression of weakness, denies any fasciculation, denies any muscle atrophy, and denies any other difficulty with speaking. She has had no symptoms of ALS.  Patient had a normal EMG showing no clear evidence of ALS. Patient's MRI scan just showed age related changes and microvascular disease typical for her age and no other lesions. Patient's carotid Dopplers were normal. All lab tests including myasthenic panel, CPK, and all other tests were negative except for positive test for anti-neuronal antibody which may be a false positive from her Sjogrens syndrome and known lupus. Patient gives a 4 year history of gradually progressive trouble swallowing, with solids more than liquids. She has seen ENT Dr. 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 She had a modified barium swallow that showed moderate pharyngeal dysphagia and mild oral dysphagia, and now a possible stricture. She denies any weakness or sensory loss in her extremities, but does complaint of mild fatigability and generalized weakness. She has no double vision, headaches, sleep problems, gait problems, fever, trauma, or other precipitating causes. She has no family history of similar problems. She had a history of stroke in 1998 that was manifested by speech difficulty and visual loss which resolved after several years without recurrent symptoms. She has a history of Sjogrens syndrome and had her teeth removed because of complications from that. She has occasional choking, and probably had aspiration pneumonia at [facility] last february, but was never seen by neurology. Her bowel and bladder function is stable, and occasionally her knees feel wobbly. She has no major back pain, headache, or neck pain. Her voice has become softer. Patient denies muscle pain, atrophy or fasciculations or cramps.     Past Medical History:   Diagnosis Date    Arthropathy     Chronic pain     lupus    Depression     Essential hypertension     Fracture     back    GERD (gastroesophageal reflux disease)     Ill-defined condition 04/2017    pneumonia    Lupus     Osteoporosis     Pneumonia     [facility] [address]     Stroke ([facility] [address] 1998    no residual weakness      Past Surgical History:   Procedure Laterality Date    HX BILATERAL SALPINGO-OOPHORECTOMY  2013    Dr. Sammi Sanchez HX KYPHOPLASTY  02/2016    Pt states T12 and L1     Family History   Problem Relation Age of Onset    Other Mother      NPH    Stroke Mother     Osteoporosis Mother     Stroke Father     Bipolar Disorder Father     Heart Disease Father     Bipolar Disorder Child       Social History   Substance Use Topics    Smoking status: Never Smoker    Smokeless tobacco: Never Used    Alcohol use 0.5 oz/week     1 Glasses of wine per week         Current Outpatient Prescriptions:     lansoprazole (PREVACID) 30 mg disintegrating tablet, Take 30 mg by mouth daily. , Disp: , Rfl:     diclofenac (VOLTAREN) 1 % topical gel, Apply 4 g to affected area as needed. , Disp: , Rfl:     atorvastatin (LIPITOR) 40 mg tablet, Take 20 mg by mouth daily (after lunch). , Disp: , Rfl:     citalopram (CELEXA) 20 mg tablet, Take 20 mg by mouth daily (after lunch). , Disp: , Rfl:     clopidogrel (PLAVIX) 75 mg tablet, Take 75 mg by mouth daily (after lunch). , Disp: , Rfl:     hydroxychloroquine (PLAQUENIL) 200 mg tablet, Take 400 mg by mouth daily. , Disp: , Rfl:     metoprolol succinate (TOPROL-XL) 50 mg XL tablet, Take 25 mg by mouth daily (after lunch). , Disp: , Rfl:     spironolactone-hydrochlorothiazide (ALDACTAZIDE) 25-25 mg per tablet, Take 0.5 Tabs by mouth daily (after lunch). , Disp: , Rfl:         Allergies   Allergen Reactions    Imuran [Azathioprine] Other (comments)     Lupus attack    Iodinated Contrast- Oral And Iv Dye Hives    Gadavist [Gadobutrol] Hives and Itching     Scant hives to back post receiving injection of gadavist. Denied any breathing problems or throat swelling. Resolved within 30 minutes.      MRI contrast Gadolinnium)        Review of Systems:  A comprehensive review of systems was negative except for: Constitutional: positive for fatigue and malaise  Respiratory: positive for pneumonia or dyspnea on exertion  Gastrointestinal: positive for dysphagia, dyspepsia and reflux symptoms  Neurological: positive for weakness and dysphasia and dysarthria  Behvioral/Psych: positive for anxiety and depression   Vitals:    08/29/18 1513   BP: 116/68   Pulse: 74   Resp: 12   Temp: 98 °F (36.7 °C)   SpO2: 98%   Weight: 115 lb (52.2 kg)   Height: 5' 5\" (1.651 m)     Objective:     I      NEUROLOGICAL EXAM:    Appearance: The patient is well developed, well nourished, provides a coherent history and is in no acute distress. Mental Status: Oriented to time, place and person, and the president, cognitive function is normal, speech minimally dysarthric but no aphasia. Mood and affect appropriate but anxious and depressed. Cranial Nerves:   Intact visual fields. Fundi are benign. SO, EOM's full, no nystagmus, no ptosis. Facial sensation is normal. Corneal reflexes are not tested. Facial movement is symmetric. Hearing is normal bilaterally. Palate is midline with normal sternocleidomastoid and trapezius muscles are normal. Tongue is midline, with no obvious fasciculations. Neck is supple without meningismus or bruits  Temporal arteries are not tender or enlarged   Motor:  4/5 strength in upper and lower proximal and distal muscles. Normal bulk and tone. No fasciculations. Reflexes:   Deep tendon reflexes 1+/4 and symmetrical and slightly brisk. No babinski or clonus present   Sensory:   Normal to touch, pinprick and vibration, and DSS. Gait:  Normal gait for age. Tremor:   No tremor noted. Cerebellar:  No cerebellar signs present. Neurovascular:  Normal heart sounds and regular rhythm, peripheral pulses decreased, and no carotid bruits. Assessment:       ICD-10-CM ICD-9-CM    1. Cerebral microvascular disease I67.9 437.9 DUPLEX CAROTID BILATERAL AMB NEURO   2.  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 Stenosis of both carotid arteries without cerebral infarction I65.23 433.10 DUPLEX CAROTID BILATERAL AMB NEURO     433.30    4. Dysarthria due to recent stroke I69.322 438.13 DUPLEX CAROTID BILATERAL AMB NEURO   5. Transient ischemic attack involving left internal carotid artery G45.1 435.8 DUPLEX CAROTID BILATERAL AMB NEURO   6. Oropharyngeal dysphagia R13.12 787.22 DUPLEX CAROTID BILATERAL AMB NEURO   7. Myopathy G72.9 359.9 DUPLEX CAROTID BILATERAL AMB NEURO         Plan:     Patient with progressive difficulty swallowing over the last year, but still no signs of motor neuron disease or any neuromuscular disease that I can see at this time. She has no atrophy or fasciculations of her tongue, no other bulbar weakness, and no systemic weakness or fasciculations to suggest motor neuron disease or neuromuscular junction disease  She has had no more strokelike symptoms, and will get another carotid Doppler since his been 2 years since her last Doppler will continue her Plavix. Patient has had surgery for cricopharyngeal muscle spasms at times and several Botox injections without improvement in her symptoms but only transitory improvement  No evidence of ALS so far. She is to continue her Plavix therapy and call us if any change in call for results of her tests  Patient with dysphagia and dysarthria and neuromuscular disease of the oral pharynx, without clear fasciculations or atrophy of her muscles or tongue.   MRI and EMG and blood tests are all unremarkable except for a questionable anti-neuronal antibody probably related to lupus  Patient encouraged to be very careful swallowing until we undergo this evaluation  She will call if any problem in the interim, and followup will be arranged in 6 months time or earlier if needed  35 minutes spent with patient in detail exam, checking her carefully for any fasciculations upper extremities or tongue, and discussing her possible diagnosis, prognosis and treatment    Signed By: Linda Núñez MD     August 29, 2018       This note will not be viewable in [address]

## 2018-08-31 NOTE — PROCEDURES
This study consisted of pulsed wave Doppler examination, Color-flow imaging, and Duplex imaging of both the right and left carotid systems, and both vertebral arteries.        Imaging of both right and left carotid systems showed mild mixed plaquing at the bifurcations and proximal and distal internal and external carotid arteries bilaterally, with stenosis in the range of 16-49% only and with no flow abnormalities identified.        Both vertebral arteries showed normal antegrade flow.

## 2019-08-27 ENCOUNTER — OFFICE VISIT (OUTPATIENT)
Dept: NEUROLOGY | Age: 84
End: 2019-08-27

## 2019-08-27 VITALS
HEART RATE: 62 BPM | SYSTOLIC BLOOD PRESSURE: 156 MMHG | HEIGHT: 65 IN | OXYGEN SATURATION: 100 % | WEIGHT: 112 LBS | DIASTOLIC BLOOD PRESSURE: 86 MMHG | BODY MASS INDEX: 18.66 KG/M2 | RESPIRATION RATE: 16 BRPM

## 2019-08-27 DIAGNOSIS — I67.89 CEREBRAL MICROVASCULAR DISEASE: Primary | ICD-10-CM

## 2019-08-27 DIAGNOSIS — R13.12 OROPHARYNGEAL DYSPHAGIA: ICD-10-CM

## 2019-08-27 DIAGNOSIS — G45.1 TRANSIENT ISCHEMIC ATTACK INVOLVING LEFT INTERNAL CAROTID ARTERY: ICD-10-CM

## 2019-08-27 DIAGNOSIS — I65.23 STENOSIS OF BOTH CAROTID ARTERIES WITHOUT CEREBRAL INFARCTION: ICD-10-CM

## 2019-08-27 DIAGNOSIS — Z86.73 HISTORY OF STROKE: ICD-10-CM

## 2019-08-27 RX ORDER — BACLOFEN 10 MG/1
TABLET ORAL AS NEEDED
COMMUNITY

## 2019-08-27 NOTE — PATIENT INSTRUCTIONS
A Healthy Lifestyle: Care Instructions  Your Care Instructions    A healthy lifestyle can help you feel good, stay at a healthy weight, and have plenty of energy for both work and play. A healthy lifestyle is something you can share with your whole family. A healthy lifestyle also can lower your risk for serious health problems, such as high blood pressure, heart disease, and diabetes. You can follow a few steps listed below to improve your health and the health of your family. Follow-up care is a key part of your treatment and safety. Be sure to make and go to all appointments, and call your doctor if you are having problems. It's also a good idea to know your test results and keep a list of the medicines you take. How can you care for yourself at home? · Do not eat too much sugar, fat, or fast foods. You can still have dessert and treats now and then. The goal is moderation. · Start small to improve your eating habits. Pay attention to portion sizes, drink less juice and soda pop, and eat more fruits and vegetables. ? Eat a healthy amount of food. A 3-ounce serving of meat, for example, is about the size of a deck of cards. Fill the rest of your plate with vegetables and whole grains. ? Limit the amount of soda and sports drinks you have every day. Drink more water when you are thirsty. ? Eat at least 5 servings of fruits and vegetables every day. It may seem like a lot, but it is not hard to reach this goal. A serving or helping is 1 piece of fruit, 1 cup of vegetables, or 2 cups of leafy, raw vegetables. Have an apple or some carrot sticks as an afternoon snack instead of a candy bar. Try to have fruits and/or vegetables at every meal.  · Make exercise part of your daily routine. You may want to start with simple activities, such as walking, bicycling, or slow swimming. Try to be active 30 to 60 minutes every day. You do not need to do all 30 to 60 minutes all at once.  For example, you can exercise 3 times a day for 10 or 20 minutes. Moderate exercise is safe for most people, but it is always a good idea to talk to your doctor before starting an exercise program.  · Keep moving. [employer] the lawn, work in the garden, or [employer]. Take the stairs instead of the elevator at work. · If you smoke, quit. People who smoke have an increased risk for heart attack, stroke, cancer, and other lung illnesses. Quitting is hard, but there are ways to boost your chance of quitting tobacco for good. ? Use nicotine gum, patches, or lozenges. ? Ask your doctor about stop-smoking programs and medicines. ? Keep trying. In addition to reducing your risk of diseases in the future, you will notice some benefits soon after you stop using tobacco. If you have shortness of breath or asthma symptoms, they will likely get better within a few weeks after you quit. · Limit how much alcohol you drink. Moderate amounts of alcohol (up to 2 drinks a day for men, 1 drink a day for women) are okay. But drinking too much can lead to liver problems, high blood pressure, and other health problems. Family health  If you have a family, there are many things you can do together to improve your health. · Eat meals together as a family as often as possible. · Eat healthy foods. This includes fruits, vegetables, lean meats and dairy, and whole grains. · Include your family in your fitness plan. Most people think of activities such as jogging or tennis as the way to fitness, but there are many ways you and your family can be more active. Anything that makes you breathe hard and gets your heart pumping is exercise. Here are some tips:  ? Walk to do errands or to take your child to school or the bus.  ? Go for a family bike ride after dinner instead of watching TV. Where can you learn more? Go to http://m[place]-edward.info/. Enter R131 in the search box to learn more about \"A Healthy Lifestyle: Care Instructions. \"  Current as of: September 11, 2018  Content Version: 12.1  © 3798-0033 [employer]. Care instructions adapted under license by [employer] (which disclaims liability or warranty for this information). If you have questions about a medical condition or this instruction, always ask your healthcare professional. [address] any warranty or liability for your use of this information. Office Policies    o Phone calls/patient messages:  Please allow up to 24 hours for someone in the office to contact you about your call or message. Be mindful your provider may be out of the office or your message may require further review. We encourage you to use [employer] for your messages as this is a faster, more efficient way to communicate with our office    o Medication Refills:  Prescription medications require up to 48 business hours to process. We encourage you to use [employer] for your refills. For controlled medications: Please allow up to 72 business hours to process. Certain medications may require you to  a written prescription at our office. NO narcotic/controlled medications will be prescribed after 4pm Monday through Friday or on weekends    o Form/Paperwork Completion:  We ask that you allow 7-14 business days.  You may also download your forms to [employer] to have your doctor print off.        [id number]

## 2019-08-27 NOTE — PROGRESS NOTES
Consult    Subjective:     Deepti Lawson is a 80 y.o. right-handed  female seen for evaluation at the request of Dr. Tatianna Allen of problem of increasing difficulty swallowing, to the point the patient was on honey thickened liquids for swallowing difficulty, and still losing weight some because she cannot get enough food down, but since her last visit 1 year ago, she had a procedure done by Dr. Neha Alfaro where they cut something in her throat which has allowed her to swallow more food and she is actually gained 4 to 6 pounds recently. Her voice sounds stronger also. Her voice seems less hoarse in addition. She has been dilated 3 times and had Botox several times, with only transient improvement in her swallowing ability. She had a negative neurologic workup for neuromuscular disease as a cause of her symptoms, but treating her esophageal stricture does not seem to be helping all that much. On exam again she does not have any clear atrophy or fasciculations of her tongue to suggest motor neuron disease and no systemic weakness, and no fasciculations or hyperreflexia. She had no other bulbar weakness. She had normal CPK enzymes also in the past and myasthenia test negative. Her symptoms are persistent not intermittent like neuromuscular junction disease. She has had no other recurrent neurological TIAs or strokes, and has not had a Doppler in 2 years, so one was ordered for tomorrow, she is to continue her antiplatelet therapy of Plavix. Her stroke was a spell in 2016 of her right arm suddenly became hard to control and had some abnormal dyskinetic movements lasting several minutes, associated with a sensation of weakness and clumsiness. Patient's Dopplers were on remarkable, and she continues Plavix 75 mg once a day without recurrent symptoms. She has had a previous stroke and has diffuse microvascular disease on her MRI scan done one year ago.   Her EEG to rule out seizures in view of her past history of strokes was also normal. She denies any progression of weakness, denies any fasciculation, denies any muscle atrophy, and denies any other difficulty with speaking. She has had no symptoms of ALS. Patient had a normal EMG showing no clear evidence of ALS. Patient's MRI scan just showed age related changes and microvascular disease typical for her age and no other lesions. Patient's carotid Dopplers were normal. All lab tests including myasthenic panel, CPK, and all other tests were negative except for positive test for anti-neuronal antibody which may be a false positive from her Sjogrens syndrome and known lupus. Patient gives a 4 year history of gradually progressive trouble swallowing, with solids more than liquids. She has seen ENT Dr. 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 She had a modified barium swallow that showed moderate pharyngeal dysphagia and mild oral dysphagia, and now a possible stricture. She denies any weakness or sensory loss in her extremities, but does complaint of mild fatigability and generalized weakness. She has no double vision, headaches, sleep problems, gait problems, fever, trauma, or other precipitating causes. She has no family history of similar problems. She had a history of stroke in 1998 that was manifested by speech difficulty and visual loss which resolved after several years without recurrent symptoms. She has a history of Sjogrens syndrome and had her teeth removed because of complications from that. She has occasional choking, and probably had aspiration pneumonia at [facility] last february, but was never seen by neurology. Her bowel and bladder function is stable, and occasionally her knees feel wobbly. She has no major back pain, headache, or neck pain. Her voice has become softer. Patient denies muscle pain, atrophy or fasciculations or cramps.     Past Medical History:   Diagnosis Date    Arthropathy     Chronic pain     lupus    Depression     Essential hypertension     Fracture     back    GERD (gastroesophageal reflux disease)     Ill-defined condition 04/2017    pneumonia    [address] [address]     Osteoporosis     Pneumonia     Sjoegren syndrome     Stroke ([facility] [address] 1998    no residual weakness      Past Surgical History:   Procedure Laterality Date    HX BILATERAL SALPINGO-OOPHORECTOMY  2013    Dr. Valles Comment HX ENDOSCOPY      HX HEENT  10/2018    throat surgery    HX KYPHOPLASTY  02/2016    Pt states T12 and L1     Family History   Problem Relation Age of Onset    Other Mother         NPH    Stroke Mother     Osteoporosis Mother     Stroke Father     Bipolar Disorder Father     Heart Disease Father     Bipolar Disorder Child       Social History     Tobacco Use    Smoking status: Never Smoker    Smokeless tobacco: Never Used   Substance Use Topics    Alcohol use: Yes     Alcohol/week: 0.8 standard drinks     Types: 1 Glasses of wine per week         Current Outpatient Medications:     baclofen (LIORESAL) 10 mg tablet, Take  by mouth as needed. , Disp: , Rfl:     lansoprazole (PREVACID) 30 mg disintegrating tablet, Take 30 mg by mouth daily. , Disp: , Rfl:     diclofenac (VOLTAREN) 1 % topical gel, Apply 4 g to affected area as needed. , Disp: , Rfl:     atorvastatin (LIPITOR) 40 mg tablet, Take 20 mg by mouth daily (after lunch). , Disp: , Rfl:     citalopram (CELEXA) 20 mg tablet, Take 20 mg by mouth daily (after lunch). , Disp: , Rfl:     clopidogrel (PLAVIX) 75 mg tablet, Take 75 mg by mouth daily (after lunch). , Disp: , Rfl:     hydroxychloroquine (PLAQUENIL) 200 mg tablet, Take 400 mg by mouth daily. , Disp: , Rfl:     metoprolol succinate (TOPROL-XL) 50 mg XL tablet, Take 25 mg by mouth daily (after lunch). , Disp: , Rfl:     spironolactone-hydrochlorothiazide (ALDACTAZIDE) 25-25 mg per tablet, Take 0.5 Tabs by mouth daily (after lunch). , Disp: , Rfl:         Allergies   Allergen Reactions    Imuran [Azathioprine] Other (comments)     Lupus attack    Iodinated Contrast Media Hives    Gadavist [Gadobutrol] Hives and Itching     Scant hives to back post receiving injection of gadavist. Denied any breathing problems or throat swelling. Resolved within 30 minutes. MRI contrast Gadolinnium)        Review of Systems:  A comprehensive review of systems was negative except for: Constitutional: positive for fatigue and malaise  Respiratory: positive for pneumonia or dyspnea on exertion  Gastrointestinal: positive for dysphagia, dyspepsia and reflux symptoms  Neurological: positive for weakness and dysphasia and dysarthria  Behvioral/Psych: positive for anxiety and depression   Vitals:    08/27/19 1436   BP: 156/86   Pulse: 62   Resp: 16   SpO2: 100%   Weight: 112 lb (50.8 kg)   Height: 5' 5\" (1.651 m)     Objective:     I      NEUROLOGICAL EXAM:    Appearance: The patient is thinly developed and nourished, provides a coherent history and is in no acute distress. Mental Status: Oriented to time, place and person, and the president, cognitive function is normal, speech minimally dysarthric but no aphasia. Mood and affect appropriate but anxious and depressed. Cranial Nerves:   Intact visual fields. Fundi are benign, discs are poorly seen. SO, EOM's full, no nystagmus, no ptosis. Facial sensation is normal. Corneal reflexes are not tested. Facial movement is symmetric, there may be slight facial weakness noted on forced eyelid closure. [place] Hearing is abnormal bilaterally. Palate is midline with normal sternocleidomastoid and trapezius muscles are normal. Tongue is midline, with no obvious fasciculations. Neck is supple without meningismus or bruits  Temporal arteries are not tender or enlarged   Motor:  4/5 strength in upper and lower proximal and distal muscles. Normal bulk and tone. No fasciculations.   Rapid alternating movement is a little slow bilaterally   Reflexes:   Deep tendon reflexes 1+/4 and symmetrical and slightly brisk. No babinski or clonus present   Sensory:   Normal to touch, pinprick and vibration, and DSS. Gait:  Normal gait for age though she does move a little slowly. Tremor:   No tremor noted. Cerebellar:  No cerebellar signs present on finger-nose-finger exam, Romberg and tandem are mildly abnormal.   Neurovascular:  Normal heart sounds and regular rhythm, peripheral pulses decreased, and no carotid bruits. Assessment:       ICD-10-CM ICD-9-CM    1. Cerebral microvascular disease I67.9 437.9    2. Stenosis of both carotid arteries without cerebral infarction I65.23 433.10      433.30    3. Transient ischemic attack involving left internal carotid artery G45.1 435.8    4. Oropharyngeal dysphagia R13.12 787.22    5. History of stroke Z86.73 V12.54          Plan:     Patient with progressive difficulty swallowing over the last year, but still no signs of motor neuron disease or any neuromuscular disease that I can see at this time. She does have mild bifacial weakness that is a little unclear. She has no atrophy or fasciculations of her tongue, no other bulbar weakness, and no systemic weakness or fasciculations to suggest motor neuron disease or neuromuscular junction disease  She has had no more strokelike symptoms, and will get another carotid Doppler next visit since is been 1 year since her last Doppler. She is to continue her Plavix  Patient has had surgery for cricopharyngeal muscle spasms at times and several Botox injections without improvement in her symptoms but only transitory improvement  No evidence of ALS so far. She is to continue her Plavix therapy and call us if any change in call for results of her tests  Patient with dysphagia and dysarthria and neuromuscular disease of the oral pharynx, without clear fasciculations or atrophy of her muscles or tongue.   MRI and EMG and blood tests are all unremarkable except for a questionable anti-neuronal antibody probably related to lupus  We will repeat that next visit. Patient states feels much better, gaining weight, getting stronger all the time and exercising more. She will call if any problem in the interim, and followup will be arranged in 6 months time or earlier if needed  35 minutes spent with patient in detail exam, checking her carefully for any fasciculations upper extremities or tongue, and discussing her possible diagnosis, prognosis and treatment    Signed By: Daryn Barros MD     August 27, 2019       This note will not be viewable in [address]

## 2019-08-27 NOTE — LETTER
8/27/2019 5:07 PM 
 
Patient:  Doreene Krabbe YOB: 1935 Date of Visit: 8/27/2019 Dear No Recipients: Thank you for referring Ms. Caro Lawson to me for evaluation/treatment. Below are the relevant portions of my assessment and plan of care. Consult Subjective:  
 
Caro Lawson is a 80 y.o. right-handed  female seen for evaluation at the request of Dr. Marcial San of problem of increasing difficulty swallowing, to the point the patient was on honey thickened liquids for swallowing difficulty, and still losing weight some because she cannot get enough food down, but since her last visit 1 year ago, she had a procedure done by Dr. Patrick Donovan where they cut something in her throat which has allowed her to swallow more food and she is actually gained 4 to 6 pounds recently. Her voice sounds stronger also. Her voice seems less hoarse in addition. She has been dilated 3 times and had Botox several times, with only transient improvement in her swallowing ability. She had a negative neurologic workup for neuromuscular disease as a cause of her symptoms, but treating her esophageal stricture does not seem to be helping all that much. On exam again she does not have any clear atrophy or fasciculations of her tongue to suggest motor neuron disease and no systemic weakness, and no fasciculations or hyperreflexia. She had no other bulbar weakness. She had normal CPK enzymes also in the past and myasthenia test negative. Her symptoms are persistent not intermittent like neuromuscular junction disease. She has had no other recurrent neurological TIAs or strokes, and has not had a Doppler in 2 years, so one was ordered for tomorrow, she is to continue her antiplatelet therapy of Plavix.   Her stroke was a spell in 2016 of her right arm suddenly became hard to control and had some abnormal dyskinetic movements lasting several minutes, associated with a sensation of weakness and clumsiness. Patient's Dopplers were on remarkable, and she continues Plavix 75 mg once a day without recurrent symptoms. She has had a previous stroke and has diffuse microvascular disease on her MRI scan done one year ago. Her EEG to rule out seizures in view of her past history of strokes was also normal. She denies any progression of weakness, denies any fasciculation, denies any muscle atrophy, and denies any other difficulty with speaking. She has had no symptoms of ALS. Patient had a normal EMG showing no clear evidence of ALS. Patient's MRI scan just showed age related changes and microvascular disease typical for her age and no other lesions. Patient's carotid Dopplers were normal. All lab tests including myasthenic panel, CPK, and all other tests were negative except for positive test for anti-neuronal antibody which may be a false positive from her Sjogrens syndrome and known lupus. Patient gives a 4 year history of gradually progressive trouble swallowing, with solids more than liquids. She has seen ENT Dr. 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 She had a modified barium swallow that showed moderate pharyngeal dysphagia and mild oral dysphagia, and now a possible stricture. She denies any weakness or sensory loss in her extremities, but does complaint of mild fatigability and generalized weakness. She has no double vision, headaches, sleep problems, gait problems, fever, trauma, or other precipitating causes. She has no family history of similar problems. She had a history of stroke in 1998 that was manifested by speech difficulty and visual loss which resolved after several years without recurrent symptoms. She has a history of Sjogrens syndrome and had her teeth removed because of complications from that.  She has occasional choking, and probably had aspiration pneumonia at [facility] last february, but was never seen by neurology. Her bowel and bladder function is stable, and occasionally her knees feel wobbly. She has no major back pain, headache, or neck pain. Her voice has become softer. Patient denies muscle pain, atrophy or fasciculations or cramps. Past Medical History:  
Diagnosis Date  Arthropathy  Chronic pain   
 lupus  Depression  Essential hypertension  Fracture   
 back  GERD (gastroesophageal reflux disease)  Ill-defined condition 04/2017 pneumonia  Lupus ([facility] [address]  Osteoporosis  Pneumonia  Sjoegren syndrome  Stroke ([facility] [address] 1998  
 no residual weakness Past Surgical History:  
Procedure Laterality Date  HX BILATERAL SALPINGO-OOPHORECTOMY  2013 Dr. Elsy Day  HX ENDOSCOPY    
 HX HEENT  10/2018  
 throat surgery  HX KYPHOPLASTY  02/2016 Pt states T12 and L1 Family History Problem Relation Age of Onset [facility] Other Mother NPH  Stroke Mother  Osteoporosis Mother  Stroke Father  Bipolar Disorder Father  Heart Disease Father  Bipolar Disorder Child Social History Tobacco Use  Smoking status: Never Smoker  Smokeless tobacco: Never Used Substance Use Topics  Alcohol use: Yes Alcohol/week: 0.8 standard drinks Types: 1 Glasses of wine per week Current Outpatient Medications:  
  baclofen (LIORESAL) 10 mg tablet, Take  by mouth as needed. , Disp: , Rfl:  
  lansoprazole (PREVACID) 30 mg disintegrating tablet, Take 30 mg by mouth daily. , Disp: , Rfl:  
  diclofenac (VOLTAREN) 1 % topical gel, Apply 4 g to affected area as needed. , Disp: , Rfl:  
  atorvastatin (LIPITOR) 40 mg tablet, Take 20 mg by mouth daily (after lunch). , Disp: , Rfl:  
  citalopram (CELEXA) 20 mg tablet, Take 20 mg by mouth daily (after lunch). , Disp: , Rfl:  
  clopidogrel (PLAVIX) 75 mg tablet, Take 75 mg by mouth daily (after lunch). , Disp: , Rfl:  
   hydroxychloroquine (PLAQUENIL) 200 mg tablet, Take 400 mg by mouth daily. , Disp: , Rfl:  
  metoprolol succinate (TOPROL-XL) 50 mg XL tablet, Take 25 mg by mouth daily (after lunch). , Disp: , Rfl:  
  spironolactone-hydrochlorothiazide (ALDACTAZIDE) 25-25 mg per tablet, Take 0.5 Tabs by mouth daily (after lunch). , Disp: , Rfl:  
 
 
 
Allergies Allergen Reactions  Imuran [Azathioprine] Other (comments) Lupus attack  Iodinated Contrast Media Hives  Gadavist [Gadobutrol] Hives and Itching Scant hives to back post receiving injection of gadavist. Denied any breathing problems or throat swelling. Resolved within 30 minutes. MRI contrast Gadolinnium) Review of Systems: A comprehensive review of systems was negative except for: Constitutional: positive for fatigue and malaise Respiratory: positive for pneumonia or dyspnea on exertion Gastrointestinal: positive for dysphagia, dyspepsia and reflux symptoms Neurological: positive for weakness and dysphasia and dysarthria Behvioral/Psych: positive for anxiety and depression Vitals:  
 08/27/19 1436 BP: 156/86 Pulse: 62 Resp: 16 SpO2: 100% Weight: 112 lb (50.8 kg) Height: 5' 5\" (1.651 m) Objective: I 
 
 
NEUROLOGICAL EXAM: 
 
Appearance: The patient is thinly developed and nourished, provides a coherent history and is in no acute distress. Mental Status: Oriented to time, place and person, and the president, cognitive function is normal, speech minimally dysarthric but no aphasia. Mood and affect appropriate but anxious and depressed. Cranial Nerves:   Intact visual fields. Fundi are benign, discs are poorly seen. SO, EOM's full, no nystagmus, no ptosis. Facial sensation is normal. Corneal reflexes are not tested. Facial movement is symmetric, there may be slight facial weakness noted on forced eyelid closure. [place] Hearing is abnormal bilaterally.  Palate is midline with normal sternocleidomastoid and trapezius muscles are normal. Tongue is midline, with no obvious fasciculations. Neck is supple without meningismus or bruits Temporal arteries are not tender or enlarged Motor:  4/5 strength in upper and lower proximal and distal muscles. Normal bulk and tone. No fasciculations. Rapid alternating movement is a little slow bilaterally Reflexes:   Deep tendon reflexes 1+/4 and symmetrical and slightly brisk. No babinski or clonus present Sensory:   Normal to touch, pinprick and vibration, and DSS. Gait:  Normal gait for age though she does move a little slowly. Tremor:   No tremor noted. Cerebellar:  No cerebellar signs present on finger-nose-finger exam, Romberg and tandem are mildly abnormal.  
Neurovascular:  Normal heart sounds and regular rhythm, peripheral pulses decreased, and no carotid bruits. Assessment: ICD-10-CM ICD-9-CM 1. Cerebral microvascular disease I67.9 437.9 2. Stenosis of both carotid arteries without cerebral infarction I65.23 433.10   
  433.30 3. Transient ischemic attack involving left internal carotid artery G45.1 435.8 4. Oropharyngeal dysphagia R13.12 787.22   
5. History of stroke Z86.73 V12.54 Plan:  
 
Patient with progressive difficulty swallowing over the last year, but still no signs of motor neuron disease or any neuromuscular disease that I can see at this time. She does have mild bifacial weakness that is a little unclear. She has no atrophy or fasciculations of her tongue, no other bulbar weakness, and no systemic weakness or fasciculations to suggest motor neuron disease or neuromuscular junction disease She has had no more strokelike symptoms, and will get another carotid Doppler next visit since is been 1 year since her last Doppler. She is to continue her Plavix Patient has had surgery for cricopharyngeal muscle spasms at times and several Botox injections without improvement in her symptoms but only transitory improvement No evidence of ALS so far. She is to continue her Plavix therapy and call us if any change in call for results of her tests Patient with dysphagia and dysarthria and neuromuscular disease of the oral pharynx, without clear fasciculations or atrophy of her muscles or tongue. MRI and EMG and blood tests are all unremarkable except for a questionable anti-neuronal antibody probably related to lupus We will repeat that next visit. Patient states feels much better, gaining weight, getting stronger all the time and exercising more. She will call if any problem in the interim, and followup will be arranged in 6 months time or earlier if needed 35 minutes spent with patient in detail exam, checking her carefully for any fasciculations upper extremities or tongue, and discussing her possible diagnosis, prognosis and treatment Signed By: Minal Lay MD   
 August 27, 2019 This note will not be viewable in [address] If you have questions, please do not hesitate to call me. I look forward to following Ms. Lawson along with you. Sincerely, Minal Lay MD

## 2020-01-24 ENCOUNTER — HOSPITAL ENCOUNTER (OUTPATIENT)
Dept: MAMMOGRAPHY | Age: 85
Discharge: HOME | End: 2020-01-24
Payer: MEDICARE

## 2020-01-24 DIAGNOSIS — Z78.0 POSTMENOPAUSAL: ICD-10-CM

## 2020-01-24 DIAGNOSIS — E55.9 VITAMIN D DEFICIENCY: ICD-10-CM

## 2020-01-24 DIAGNOSIS — M85.80 OSTEOPENIA: ICD-10-CM

## 2020-01-24 PROCEDURE — 77080 DXA BONE DENSITY AXIAL: CPT

## 2020-08-19 ENCOUNTER — OFFICE VISIT (OUTPATIENT)
Dept: NEUROLOGY | Age: 85
End: 2020-08-19
Payer: MEDICARE

## 2020-08-19 VITALS
OXYGEN SATURATION: 97 % | HEART RATE: 69 BPM | RESPIRATION RATE: 16 BRPM | BODY MASS INDEX: 18.3 KG/M2 | WEIGHT: 110 LBS | DIASTOLIC BLOOD PRESSURE: 80 MMHG | TEMPERATURE: 98.1 F | SYSTOLIC BLOOD PRESSURE: 140 MMHG

## 2020-08-19 DIAGNOSIS — I69.322 DYSARTHRIA DUE TO RECENT STROKE: ICD-10-CM

## 2020-08-19 DIAGNOSIS — Z86.73 HISTORY OF STROKE: ICD-10-CM

## 2020-08-19 DIAGNOSIS — G72.9 MYOPATHY: ICD-10-CM

## 2020-08-19 DIAGNOSIS — R13.12 OROPHARYNGEAL DYSPHAGIA: ICD-10-CM

## 2020-08-19 DIAGNOSIS — I67.89 CEREBRAL MICROVASCULAR DISEASE: Primary | ICD-10-CM

## 2020-08-19 DIAGNOSIS — G40.209 LOCALIZATION-RELATED PARTIAL EPILEPSY WITH COMPLEX PARTIAL SEIZURES (HCC): ICD-10-CM

## 2020-08-19 DIAGNOSIS — G45.1 TRANSIENT ISCHEMIC ATTACK INVOLVING LEFT INTERNAL CAROTID ARTERY: ICD-10-CM

## 2020-08-19 DIAGNOSIS — I65.23 STENOSIS OF BOTH CAROTID ARTERIES WITHOUT CEREBRAL INFARCTION: ICD-10-CM

## 2020-08-19 PROCEDURE — G8419 CALC BMI OUT NRM PARAM NOF/U: HCPCS | Performed by: PSYCHIATRY & NEUROLOGY

## 2020-08-19 PROCEDURE — G8427 DOCREV CUR MEDS BY ELIG CLIN: HCPCS | Performed by: PSYCHIATRY & NEUROLOGY

## 2020-08-19 PROCEDURE — G8536 NO DOC ELDER MAL SCRN: HCPCS | Performed by: PSYCHIATRY & NEUROLOGY

## 2020-08-19 PROCEDURE — G8510 SCR DEP NEG, NO PLAN REQD: HCPCS | Performed by: PSYCHIATRY & NEUROLOGY

## 2020-08-19 PROCEDURE — 1101F PT FALLS ASSESS-DOCD LE1/YR: CPT | Performed by: PSYCHIATRY & NEUROLOGY

## 2020-08-19 PROCEDURE — 99214 OFFICE O/P EST MOD 30 MIN: CPT | Performed by: PSYCHIATRY & NEUROLOGY

## 2020-08-19 PROCEDURE — 1090F PRES/ABSN URINE INCON ASSESS: CPT | Performed by: PSYCHIATRY & NEUROLOGY

## 2020-08-19 RX ORDER — MONTELUKAST SODIUM 10 MG/1
10 TABLET ORAL DAILY
COMMUNITY
End: 2021-05-13

## 2020-08-19 RX ORDER — ESOMEPRAZOLE MAGNESIUM 20 MG/1
FOR SUSPENSION ORAL DAILY
COMMUNITY
End: 2021-05-10 | Stop reason: CLARIF

## 2020-08-19 NOTE — Clinical Note
8/19/20 Patient: Rick Gutierrez YOB: 1935 Date of Visit: 8/19/2020 Michelle Hernandez MD 
VIA Dear Michelle Hernandez MD, Thank you for referring Ms. Caro Lawson to [address] [place] [address] for evaluation. My notes for this consultation are attached. If you have questions, please do not hesitate to call me. I look forward to following your patient along with you. Sincerely, Edna Hopson MD

## 2020-08-19 NOTE — PATIENT INSTRUCTIONS

## 2020-08-20 NOTE — PROGRESS NOTES
Consult    Subjective:     Zack Lawson is a 80 y.o. right-handed  female seen for evaluation at the request of Dr. Jesenia Sims of problem of increasing difficulty swallowing, when we saw her 1 year ago, but she has had her esophagus dilated and treated with Botox and she cannot swallow solid foods, and no longer on liquid diet and maintaining her weight and does not think she has any new problem but needs serial GI evaluations to make sure she does not need further dilatation. She also has not had a carotid Doppler study done in 2 years and needs a repeat Doppler study done but she has had no recurrent strokelike symptoms or TIAs in the last 2 years. Since her last visit 1 year ago, she had a procedure done by Dr. Mary Briggs where they cut something in her throat which has allowed her to swallow more food and she is actually maintained her weight. Her voice remains stable and less hoarse. . She has been dilated 3 times and had Botox several times, with only transient improvement in her swallowing ability. She had a negative neurologic workup for neuromuscular disease as a cause of her symptoms, but treating her esophageal stricture does not seem to be helping all that much. On exam again she does not have any clear atrophy or fasciculations of her tongue to suggest motor neuron disease and no systemic weakness, and no fasciculations or hyperreflexia. She had no other bulbar weakness. She had normal CPK enzymes also in the past and myasthenia test negative. Her symptoms are persistent not intermittent like neuromuscular junction disease. She has had no other recurrent neurological TIAs or strokes, and has not had a Doppler in 2 years, so one was ordered for tomorrow, she is to continue her antiplatelet therapy of Plavix.   Her stroke was a spell in 2016 of her right arm suddenly became hard to control and had some abnormal dyskinetic movements lasting several minutes, associated with a sensation of weakness and clumsiness. Patient's Dopplers were on remarkable, and she continues Plavix 75 mg once a day without recurrent symptoms. She has had a previous stroke and has diffuse microvascular disease on her MRI scan done one year ago. Her EEG to rule out seizures in view of her past history of strokes was also normal. She denies any progression of weakness, denies any fasciculation, denies any muscle atrophy, and denies any other difficulty with speaking. She has had no symptoms of ALS. Patient had a normal EMG showing no clear evidence of ALS. Patient's MRI scan just showed age related changes and microvascular disease typical for her age and no other lesions. Patient's carotid Dopplers were normal. All lab tests including myasthenic panel, CPK, and all other tests were negative except for positive test for anti-neuronal antibody which may be a false positive from her Sjogrens syndrome and known lupus. Patient gives a 4 year history of gradually progressive trouble swallowing, with solids more than liquids. She has seen ENT Dr. 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 She had a modified barium swallow that showed moderate pharyngeal dysphagia and mild oral dysphagia, and now a possible stricture. She denies any weakness or sensory loss in her extremities, but does complaint of mild fatigability and generalized weakness. She has no double vision, headaches, sleep problems, gait problems, fever, trauma, or other precipitating causes. She has no family history of similar problems. She had a history of stroke in 1998 that was manifested by speech difficulty and visual loss which resolved after several years without recurrent symptoms. She has a history of Sjogrens syndrome and had her teeth removed because of complications from that. She has occasional choking, and probably had aspiration pneumonia at [facility] last february, but was never seen by neurology.  Her bowel and bladder function is stable, and occasionally her knees feel wobbly. She has no major back pain, headache, or neck pain. Her voice has become softer. Patient denies muscle pain, atrophy or fasciculations or cramps. Past Medical History:   Diagnosis Date    Arthritis     Arthropathy     Chronic pain     lupus    Depression     Essential hypertension     Fracture     back    GERD (gastroesophageal reflux disease)     Ill-defined condition 04/2017    pneumonia    Lupus (HCC)     Osteoporosis     Pneumonia     Rheumatoid arthritis, unspecified ([address]     Sjoegren syndrome     Stroke [address] [address] 1998    no residual weakness      Past Surgical History:   Procedure Laterality Date    HX BILATERAL SALPINGO-OOPHORECTOMY  2013    Dr. Zakia Crocker    HX ENDOSCOPY      HX HEENT  10/2018    throat surgery    HX KYPHOPLASTY  02/2016    Pt states T12 and L1     Family History   Problem Relation Age of Onset    Other Mother         NPH    Stroke Mother     Osteoporosis Mother     Stroke Father     Bipolar Disorder Father     Heart Disease Father     Bipolar Disorder Child       Social History     Tobacco Use    Smoking status: Never Smoker    Smokeless tobacco: Never Used   Substance Use Topics    Alcohol use: Yes     Alcohol/week: 0.8 standard drinks     Types: 1 Glasses of wine per week         Current Outpatient Medications:     Esomeprazole Magnesium (NexIUM Packet), Take  by mouth daily. , Disp: , Rfl:     montelukast (SINGULAIR) 10 mg tablet, Take 10 mg by mouth daily. , Disp: , Rfl:     baclofen (LIORESAL) 10 mg tablet, Take  by mouth as needed. , Disp: , Rfl:     diclofenac (VOLTAREN) 1 % topical gel, Apply 4 g to affected area as needed. , Disp: , Rfl:     atorvastatin (LIPITOR) 40 mg tablet, Take 20 mg by mouth daily (after lunch). , Disp: , Rfl:     citalopram (CELEXA) 20 mg tablet, Take 20 mg by mouth daily (after lunch). , Disp: , Rfl:     clopidogrel (PLAVIX) 75 mg tablet, Take 75 mg by mouth daily (after lunch). , Disp: , Rfl:     hydroxychloroquine (PLAQUENIL) 200 mg tablet, Take 400 mg by mouth daily. , Disp: , Rfl:     metoprolol succinate (TOPROL-XL) 50 mg XL tablet, Take 25 mg by mouth daily (after lunch). , Disp: , Rfl:     spironolactone-hydrochlorothiazide (ALDACTAZIDE) 25-25 mg per tablet, Take 0.5 Tabs by mouth daily (after lunch). , Disp: , Rfl:         Allergies   Allergen Reactions    Imuran [Azathioprine] Other (comments)     Lupus attack    Iodinated Contrast Media Hives    Gadavist [Gadobutrol] Hives and Itching     Scant hives to back post receiving injection of gadavist. Denied any breathing problems or throat swelling. Resolved within 30 minutes. MRI contrast Gadolinnium)        Review of Systems:  A comprehensive review of systems was negative except for: Constitutional: positive for fatigue and malaise  Respiratory: positive for pneumonia or dyspnea on exertion  Gastrointestinal: positive for dysphagia, dyspepsia and reflux symptoms  Neurological: positive for weakness and dysphasia and dysarthria  Behvioral/Psych: positive for anxiety and depression   Vitals:    08/19/20 1315   BP: 140/80   Pulse: 69   Resp: 16   Temp: 98.1 °F (36.7 °C)   SpO2: 97%   Weight: 110 lb (49.9 kg)     Objective:     I      NEUROLOGICAL EXAM:    Appearance: The patient is thinly developed and nourished, provides a coherent history and is in no acute distress. Mental Status: Oriented to time, place and person, and the president, cognitive function is normal, speech minimally dysarthric but no aphasia. Mood and affect appropriate but anxious and depressed. Cranial Nerves:   Intact visual fields. Fundi are benign, discs are poorly seen. SO, EOM's full, no nystagmus, no ptosis. Facial sensation is normal. Corneal reflexes are not tested. Facial movement is symmetric, there may be slight facial weakness noted on forced eyelid closure. Jan Jeremy Hearing is abnormal bilaterally.  Palate is midline with normal sternocleidomastoid and trapezius muscles are normal. Tongue is midline, with no obvious fasciculations. Neck is supple without meningismus or bruits  Temporal arteries are not tender or enlarged   Motor:  4/5 strength in upper and lower proximal and distal muscles. Normal bulk and tone. No fasciculations. Rapid alternating movement is a little slow bilaterally   Reflexes:   Deep tendon reflexes 1+/4 and symmetrical and slightly brisk. No babinski or clonus present   Sensory:   Normal to touch, pinprick and vibration, and DSS. Gait:  Normal gait for age though she does move a little slowly. Tremor:   No tremor noted. Cerebellar:  No cerebellar signs present on finger-nose-finger exam, Romberg and tandem are mildly abnormal.   Neurovascular:  Normal heart sounds and regular rhythm, peripheral pulses decreased, and no carotid bruits. Assessment:       ICD-10-CM ICD-9-CM    1. Cerebral microvascular disease  I67.9 437.9 DUPLEX CAROTID BILATERAL   2. Oropharyngeal dysphagia  R13.12 787.22 DUPLEX CAROTID BILATERAL   3. Dysarthria due to recent stroke  I69.322 438.13 DUPLEX CAROTID BILATERAL   4. Stenosis of both carotid arteries without cerebral infarction  I65.23 433.10 DUPLEX CAROTID BILATERAL     433.30    5. History of stroke  Z86.73 V12.54 DUPLEX CAROTID BILATERAL   6. Myopathy  G72.9 359.9 DUPLEX CAROTID BILATERAL   7. Transient ischemic attack involving left internal carotid artery  G45.1 435.8 DUPLEX CAROTID BILATERAL   8. Localization-related partial epilepsy with complex partial seizures ([facility] [address]  G40.209 345.40 DUPLEX CAROTID BILATERAL         Plan:     Patient with difficulty swallowing over the last year, but seems stable over the last year, not really progressing, and maintaining her weight. Patient still has no signs of motor neuron disease or any neuromuscular disease that I can see at this time. She does have mild bifacial weakness that is a little unclear.   She has no atrophy or fasciculations of her tongue, no other bulbar weakness, and no systemic weakness or fasciculations to suggest motor neuron disease or neuromuscular junction disease  She has had no more strokelike symptoms, and will get another carotid Doppler next visit since is been 2 year since her last Doppler. She is to continue her Plavix  Patient has had surgery for cricopharyngeal muscle spasms at times and several Botox injections without improvement in her symptoms but only transitory improvement  No evidence of ALS so far. She is to continue her Plavix therapy and call us if any change in call for results of her tests  Patient with dysphagia and dysarthria and neuromuscular disease of the oral pharynx, without clear fasciculations or atrophy of her muscles or tongue. MRI and EMG and blood tests are all unremarkable except for a questionable anti-neuronal antibody probably related to lupus  We will repeat that next visit. Patient states feels much better, gaining weight, getting stronger all the time and exercising more. She will call if any problem in the interim, and followup will be arranged in 6 months time or earlier if needed  35 minutes spent with patient in detail exam, checking her carefully for any fasciculations upper extremities or tongue, and discussing her possible diagnosis, prognosis and treatment    Signed By: Chapo Weiss MD     August 19, 2020       This note will not be viewable in [address]

## 2020-08-24 DIAGNOSIS — I65.23 STENOSIS OF BOTH CAROTID ARTERIES WITHOUT CEREBRAL INFARCTION: ICD-10-CM

## 2020-08-24 DIAGNOSIS — I69.322 DYSARTHRIA DUE TO RECENT STROKE: ICD-10-CM

## 2020-08-24 DIAGNOSIS — G45.1 TRANSIENT ISCHEMIC ATTACK INVOLVING LEFT INTERNAL CAROTID ARTERY: ICD-10-CM

## 2020-08-24 DIAGNOSIS — G72.9 MYOPATHY: ICD-10-CM

## 2020-08-24 DIAGNOSIS — I67.89 CEREBRAL MICROVASCULAR DISEASE: ICD-10-CM

## 2020-08-24 DIAGNOSIS — G40.209 LOCALIZATION-RELATED PARTIAL EPILEPSY WITH COMPLEX PARTIAL SEIZURES (HCC): ICD-10-CM

## 2020-08-24 DIAGNOSIS — R13.12 OROPHARYNGEAL DYSPHAGIA: ICD-10-CM

## 2020-08-24 DIAGNOSIS — Z86.73 HISTORY OF STROKE: ICD-10-CM

## 2020-08-24 PROCEDURE — 93880 EXTRACRANIAL BILAT STUDY: CPT | Performed by: PSYCHIATRY & NEUROLOGY

## 2021-05-13 ENCOUNTER — APPOINTMENT (OUTPATIENT)
Dept: CT IMAGING | Age: 86
DRG: 069 | End: 2021-05-13
Attending: EMERGENCY MEDICINE
Payer: MEDICARE

## 2021-05-13 ENCOUNTER — APPOINTMENT (OUTPATIENT)
Dept: GENERAL RADIOLOGY | Age: 86
DRG: 069 | End: 2021-05-13
Attending: EMERGENCY MEDICINE
Payer: MEDICARE

## 2021-05-13 ENCOUNTER — HOSPITAL ENCOUNTER (INPATIENT)
Age: 86
LOS: 2 days | Discharge: HOME HEALTH | DRG: 069 | End: 2021-05-15
Attending: EMERGENCY MEDICINE | Admitting: HOSPITALIST
Payer: MEDICARE

## 2021-05-13 DIAGNOSIS — M81.0 OSTEOPOROSIS: ICD-10-CM

## 2021-05-13 DIAGNOSIS — E11.49 TYPE II OR UNSPECIFIED TYPE DIABETES MELLITUS WITH NEUROLOGICAL MANIFESTATIONS, NOT STATED AS UNCONTROLLED(250.60) (HCC): ICD-10-CM

## 2021-05-13 DIAGNOSIS — E03.9 HYPOTHYROID: ICD-10-CM

## 2021-05-13 DIAGNOSIS — G45.9 TIA (TRANSIENT ISCHEMIC ATTACK): ICD-10-CM

## 2021-05-13 DIAGNOSIS — I10 ESSENTIAL HYPERTENSION: ICD-10-CM

## 2021-05-13 DIAGNOSIS — R13.10 DYSPHAGIA: ICD-10-CM

## 2021-05-13 DIAGNOSIS — I63.9 ACUTE CVA (CEREBROVASCULAR ACCIDENT) (HCC): Primary | ICD-10-CM

## 2021-05-13 DIAGNOSIS — I69.322 DYSARTHRIA DUE TO RECENT STROKE: ICD-10-CM

## 2021-05-13 DIAGNOSIS — I69.922: ICD-10-CM

## 2021-05-13 DIAGNOSIS — G72.9 MYOPATHY: ICD-10-CM

## 2021-05-13 DIAGNOSIS — E53.8 B12 DEFICIENCY: ICD-10-CM

## 2021-05-13 DIAGNOSIS — I63.30 CEREBRAL THROMBOSIS WITH CEREBRAL INFARCTION (HCC): ICD-10-CM

## 2021-05-13 PROBLEM — H25.812 COMBINED FORMS OF AGE-RELATED CATARACT OF LEFT EYE: Status: ACTIVE | Noted: 2021-05-13

## 2021-05-13 PROBLEM — R47.1 DYSARTHRIA: Status: ACTIVE | Noted: 2021-05-13

## 2021-05-13 LAB
ALBUMIN SERPL-MCNC: 3.4 G/DL (ref 3.5–5)
ALBUMIN/GLOB SERPL: 0.9 {RATIO} (ref 1.1–2.2)
ALP SERPL-CCNC: 81 U/L (ref 45–117)
ALT SERPL-CCNC: 15 U/L (ref 12–78)
ANION GAP SERPL CALC-SCNC: 5 MMOL/L (ref 5–15)
APPEARANCE UR: CLEAR
AST SERPL-CCNC: 14 U/L (ref 15–37)
BACTERIA URNS QL MICRO: ABNORMAL /HPF
BASOPHILS # BLD: 0 K/UL (ref 0–0.1)
BASOPHILS NFR BLD: 0 % (ref 0–1)
BILIRUB SERPL-MCNC: 0.5 MG/DL (ref 0.2–1)
BILIRUB UR QL: NEGATIVE
BUN SERPL-MCNC: 15 MG/DL (ref 6–20)
BUN/CREAT SERPL: 23 (ref 12–20)
CALCIUM SERPL-MCNC: 8.9 MG/DL (ref 8.5–10.1)
CHLORIDE SERPL-SCNC: 103 MMOL/L (ref 97–108)
CO2 SERPL-SCNC: 28 MMOL/L (ref 21–32)
COLOR UR: ABNORMAL
CREAT SERPL-MCNC: 0.65 MG/DL (ref 0.55–1.02)
DIFFERENTIAL METHOD BLD: ABNORMAL
EOSINOPHIL # BLD: 0.2 K/UL (ref 0–0.4)
EOSINOPHIL NFR BLD: 5 % (ref 0–7)
EPITH CASTS URNS QL MICRO: ABNORMAL /LPF
ERYTHROCYTE [DISTWIDTH] IN BLOOD BY AUTOMATED COUNT: 15.9 % (ref 11.5–14.5)
GLOBULIN SER CALC-MCNC: 3.7 G/DL (ref 2–4)
GLUCOSE BLD STRIP.AUTO-MCNC: 162 MG/DL (ref 65–117)
GLUCOSE BLD STRIP.AUTO-MCNC: 86 MG/DL (ref 65–117)
GLUCOSE SERPL-MCNC: 77 MG/DL (ref 65–100)
GLUCOSE UR STRIP.AUTO-MCNC: NEGATIVE MG/DL
HCT VFR BLD AUTO: 28.4 % (ref 35–47)
HGB BLD-MCNC: 8.9 G/DL (ref 11.5–16)
HGB UR QL STRIP: NEGATIVE
HYALINE CASTS URNS QL MICRO: ABNORMAL /LPF (ref 0–5)
IMM GRANULOCYTES # BLD AUTO: 0 K/UL (ref 0–0.04)
IMM GRANULOCYTES NFR BLD AUTO: 0 % (ref 0–0.5)
INR PPP: 1.1 (ref 0.9–1.1)
KETONES UR QL STRIP.AUTO: NEGATIVE MG/DL
LEUKOCYTE ESTERASE UR QL STRIP.AUTO: NEGATIVE
LYMPHOCYTES # BLD: 1.5 K/UL (ref 0.8–3.5)
LYMPHOCYTES NFR BLD: 33 % (ref 12–49)
MCH RBC QN AUTO: 24.9 PG (ref 26–34)
MCHC RBC AUTO-ENTMCNC: 31.3 G/DL (ref 30–36.5)
MCV RBC AUTO: 79.6 FL (ref 80–99)
MONOCYTES # BLD: 0.6 K/UL (ref 0–1)
MONOCYTES NFR BLD: 13 % (ref 5–13)
NEUTS SEG # BLD: 2.2 K/UL (ref 1.8–8)
NEUTS SEG NFR BLD: 49 % (ref 32–75)
NITRITE UR QL STRIP.AUTO: NEGATIVE
NRBC # BLD: 0 K/UL (ref 0–0.01)
NRBC BLD-RTO: 0 PER 100 WBC
PH UR STRIP: 6.5 [PH] (ref 5–8)
PLATELET # BLD AUTO: 220 K/UL (ref 150–400)
PMV BLD AUTO: 9.9 FL (ref 8.9–12.9)
POTASSIUM SERPL-SCNC: 3.9 MMOL/L (ref 3.5–5.1)
PROT SERPL-MCNC: 7.1 G/DL (ref 6.4–8.2)
PROT UR STRIP-MCNC: NEGATIVE MG/DL
PROTHROMBIN TIME: 11.3 SEC (ref 9–11.1)
RBC # BLD AUTO: 3.57 M/UL (ref 3.8–5.2)
RBC #/AREA URNS HPF: ABNORMAL /HPF (ref 0–5)
SERVICE CMNT-IMP: ABNORMAL
SERVICE CMNT-IMP: NORMAL
SODIUM SERPL-SCNC: 136 MMOL/L (ref 136–145)
SP GR UR REFRACTOMETRY: 1.03 (ref 1–1.03)
TROPONIN I SERPL-MCNC: <0.05 NG/ML
UA: UC IF INDICATED,UAUC: ABNORMAL
UROBILINOGEN UR QL STRIP.AUTO: 1 EU/DL (ref 0.2–1)
WBC # BLD AUTO: 4.5 K/UL (ref 3.6–11)
WBC URNS QL MICRO: ABNORMAL /HPF (ref 0–4)

## 2021-05-13 PROCEDURE — 85025 COMPLETE CBC W/AUTO DIFF WBC: CPT

## 2021-05-13 PROCEDURE — 99285 EMERGENCY DEPT VISIT HI MDM: CPT

## 2021-05-13 PROCEDURE — 74011250636 HC RX REV CODE- 250/636: Performed by: HOSPITALIST

## 2021-05-13 PROCEDURE — 70450 CT HEAD/BRAIN W/O DYE: CPT

## 2021-05-13 PROCEDURE — 74011250637 HC RX REV CODE- 250/637: Performed by: HOSPITALIST

## 2021-05-13 PROCEDURE — 36415 COLL VENOUS BLD VENIPUNCTURE: CPT

## 2021-05-13 PROCEDURE — 84484 ASSAY OF TROPONIN QUANT: CPT

## 2021-05-13 PROCEDURE — 81001 URINALYSIS AUTO W/SCOPE: CPT

## 2021-05-13 PROCEDURE — 82962 GLUCOSE BLOOD TEST: CPT

## 2021-05-13 PROCEDURE — 74011250636 HC RX REV CODE- 250/636: Performed by: EMERGENCY MEDICINE

## 2021-05-13 PROCEDURE — 74011000250 HC RX REV CODE- 250: Performed by: EMERGENCY MEDICINE

## 2021-05-13 PROCEDURE — 96374 THER/PROPH/DIAG INJ IV PUSH: CPT

## 2021-05-13 PROCEDURE — 70496 CT ANGIOGRAPHY HEAD: CPT

## 2021-05-13 PROCEDURE — 74011000636 HC RX REV CODE- 636: Performed by: EMERGENCY MEDICINE

## 2021-05-13 PROCEDURE — 65660000000 HC RM CCU STEPDOWN

## 2021-05-13 PROCEDURE — 77030038269 HC DRN EXT URIN PURWCK BARD -A

## 2021-05-13 PROCEDURE — 96375 TX/PRO/DX INJ NEW DRUG ADDON: CPT

## 2021-05-13 PROCEDURE — 93005 ELECTROCARDIOGRAM TRACING: CPT

## 2021-05-13 PROCEDURE — 4A03X5D MEASUREMENT OF ARTERIAL FLOW, INTRACRANIAL, EXTERNAL APPROACH: ICD-10-PCS | Performed by: RADIOLOGY

## 2021-05-13 PROCEDURE — 71045 X-RAY EXAM CHEST 1 VIEW: CPT

## 2021-05-13 PROCEDURE — 85610 PROTHROMBIN TIME: CPT

## 2021-05-13 PROCEDURE — 80053 COMPREHEN METABOLIC PANEL: CPT

## 2021-05-13 PROCEDURE — 0042T CT CODE NEURO PERF W CBF: CPT

## 2021-05-13 PROCEDURE — 94762 N-INVAS EAR/PLS OXIMTRY CONT: CPT

## 2021-05-13 RX ORDER — VIT A/VIT C/VIT E/ZINC/COPPER 2148-113
2 TABLET ORAL DAILY
COMMUNITY

## 2021-05-13 RX ORDER — ESOMEPRAZOLE MAGNESIUM 40 MG/1
40 CAPSULE, DELAYED RELEASE ORAL
COMMUNITY
Start: 2021-05-05 | End: 2021-05-21

## 2021-05-13 RX ORDER — ATORVASTATIN CALCIUM 20 MG/1
20 TABLET, FILM COATED ORAL
Status: DISCONTINUED | OUTPATIENT
Start: 2021-05-13 | End: 2021-05-14

## 2021-05-13 RX ORDER — POLYETHYLENE GLYCOL 400 AND PROPYLENE GLYCOL 4; 3 MG/ML; MG/ML
1 SOLUTION/ DROPS OPHTHALMIC 2 TIMES DAILY
COMMUNITY

## 2021-05-13 RX ORDER — ONDANSETRON 2 MG/ML
4 INJECTION INTRAMUSCULAR; INTRAVENOUS
Status: DISCONTINUED | OUTPATIENT
Start: 2021-05-13 | End: 2021-05-13

## 2021-05-13 RX ORDER — ASPIRIN 81 MG/1
81 TABLET ORAL DAILY
Status: DISCONTINUED | OUTPATIENT
Start: 2021-05-13 | End: 2021-05-15 | Stop reason: HOSPADM

## 2021-05-13 RX ORDER — ACETAMINOPHEN 650 MG/1
650 SUPPOSITORY RECTAL
Status: DISCONTINUED | OUTPATIENT
Start: 2021-05-13 | End: 2021-05-15 | Stop reason: HOSPADM

## 2021-05-13 RX ORDER — CETIRIZINE HYDROCHLORIDE 5 MG/1
5 TABLET ORAL DAILY
COMMUNITY

## 2021-05-13 RX ORDER — PANTOPRAZOLE SODIUM 40 MG/1
40 TABLET, DELAYED RELEASE ORAL
Status: DISCONTINUED | OUTPATIENT
Start: 2021-05-13 | End: 2021-05-15 | Stop reason: HOSPADM

## 2021-05-13 RX ORDER — DIPHENHYDRAMINE HYDROCHLORIDE 50 MG/ML
25 INJECTION, SOLUTION INTRAMUSCULAR; INTRAVENOUS
Status: COMPLETED | OUTPATIENT
Start: 2021-05-13 | End: 2021-05-13

## 2021-05-13 RX ORDER — ACETAMINOPHEN 325 MG/1
650 TABLET ORAL
Status: DISCONTINUED | OUTPATIENT
Start: 2021-05-13 | End: 2021-05-15 | Stop reason: HOSPADM

## 2021-05-13 RX ORDER — CETIRIZINE HCL 10 MG
5 TABLET ORAL DAILY
Status: DISCONTINUED | OUTPATIENT
Start: 2021-05-14 | End: 2021-05-15 | Stop reason: HOSPADM

## 2021-05-13 RX ORDER — CLOPIDOGREL BISULFATE 75 MG/1
75 TABLET ORAL DAILY
Status: DISCONTINUED | OUTPATIENT
Start: 2021-05-13 | End: 2021-05-15 | Stop reason: HOSPADM

## 2021-05-13 RX ORDER — ONDANSETRON 2 MG/ML
4 INJECTION INTRAMUSCULAR; INTRAVENOUS
Status: DISCONTINUED | OUTPATIENT
Start: 2021-05-13 | End: 2021-05-15 | Stop reason: HOSPADM

## 2021-05-13 RX ORDER — ACETAMINOPHEN 325 MG/1
650 TABLET ORAL
Status: DISCONTINUED | OUTPATIENT
Start: 2021-05-13 | End: 2021-05-13

## 2021-05-13 RX ORDER — LORAZEPAM 2 MG/ML
0.5 INJECTION INTRAMUSCULAR
Status: DISCONTINUED | OUTPATIENT
Start: 2021-05-14 | End: 2021-05-15 | Stop reason: HOSPADM

## 2021-05-13 RX ORDER — ACETAMINOPHEN 500 MG
500 TABLET ORAL
COMMUNITY

## 2021-05-13 RX ORDER — CITALOPRAM 20 MG/1
20 TABLET, FILM COATED ORAL
Status: DISCONTINUED | OUTPATIENT
Start: 2021-05-14 | End: 2021-05-15 | Stop reason: HOSPADM

## 2021-05-13 RX ORDER — SODIUM CHLORIDE 9 MG/ML
75 INJECTION, SOLUTION INTRAVENOUS CONTINUOUS
Status: DISPENSED | OUTPATIENT
Start: 2021-05-13 | End: 2021-05-14

## 2021-05-13 RX ADMIN — FAMOTIDINE 20 MG: 10 INJECTION, SOLUTION INTRAVENOUS at 12:00

## 2021-05-13 RX ADMIN — ASPIRIN 81 MG: 81 TABLET, DELAYED RELEASE ORAL at 18:46

## 2021-05-13 RX ADMIN — IOPAMIDOL 100 ML: 755 INJECTION, SOLUTION INTRAVENOUS at 12:12

## 2021-05-13 RX ADMIN — CLOPIDOGREL BISULFATE 75 MG: 75 TABLET ORAL at 18:46

## 2021-05-13 RX ADMIN — ATORVASTATIN CALCIUM 20 MG: 20 TABLET, FILM COATED ORAL at 21:49

## 2021-05-13 RX ADMIN — PANTOPRAZOLE SODIUM 40 MG: 40 TABLET, DELAYED RELEASE ORAL at 18:46

## 2021-05-13 RX ADMIN — DIPHENHYDRAMINE HYDROCHLORIDE 25 MG: 50 INJECTION, SOLUTION INTRAMUSCULAR; INTRAVENOUS at 12:00

## 2021-05-13 RX ADMIN — SODIUM CHLORIDE 75 ML/HR: 9 INJECTION, SOLUTION INTRAVENOUS at 18:45

## 2021-05-13 RX ADMIN — METHYLPREDNISOLONE SODIUM SUCCINATE 125 MG: 125 INJECTION, POWDER, FOR SOLUTION INTRAMUSCULAR; INTRAVENOUS at 12:03

## 2021-05-13 NOTE — ED NOTES
Assumed care of pt via EMS stretcher. Pt is A&O x 4. Per EMS pt's daughter called reporting pt was having difficulty getting her words out beginning at 1030 today. Per EMS BG was 68 in transit. On arrival BG was 86. Pt evaluated by Dr Faisal Funez on arrival and Code S Level 1 called. Pt has delay in getting words out, however answering appropriately. Pt reports she recently stopped taking Plavix. Pt is followed by Dr Preston Trivedi, however not able to explain what he follows her for.    1127 Pt arrives in CT.      1132 Dry CT completed    1141 Dr Negro Mckeon (Tele Neuro) at bedside evaluating pt in CT.     1158 Neuro Eval complete with family at bedside. Dr Negro Mckeon explained process. 1205 Pt pre-medicated prior to CT with contrast due to contrast allergy.

## 2021-05-13 NOTE — PROGRESS NOTES
Spiritual Care Assessment/Progress Note  [facility]      NAME: Alda Guadalupe      MRN: [id number]  AGE: 80 y.o. SEX: female  Sikh Affiliation: Jehovah witness   Language: English     5/13/2021     Total Time (in minutes): 5     Spiritual Assessment begun in [facility] EMERGENCY DEPT through conversation with:         []Patient        [] Family    [] Friend(s)        Reason for Consult: Crisis     Spiritual beliefs: (Please include comment if needed)     [] Identifies with a luis daniel tradition:         [] Supported by a luis daniel community:            [] Claims no spiritual orientation:           [] Seeking spiritual identity:                [] Adheres to an individual form of spirituality:           [x] Not able to assess:                           Identified resources for coping:      [] Prayer                               [] Music                  [] Guided Imagery     [] Family/friends                 [] Pet visits     [] Devotional reading                         [x] Unknown     [] Other:                                              Interventions offered during this visit: (See comments for more details)    Patient Interventions: Crisis           Plan of Care:     [] Support spiritual and/or cultural needs    [] Support AMD and/or advance care planning process      [] Support grieving process   [] Coordinate Rites and/or Rituals    [] Coordination with community clergy   [] No spiritual needs identified at this time   [] Detailed Plan of Care below (See Comments)  [] Make referral to Music Therapy  [] Make referral to Pet Therapy     [] Make referral to Addiction services  [] Make referral to [facility]  [] Make referral to Spiritual Care Partner  [] No future visits requested        [x] Follow up upon further referrals     Comments:      responded to Code Stroke Level 1 alert for Ms. Lawson in ER11.  Upon arrival, patient was not in the room, moved to the test. No family was present, unable to assess. Contact  if further needs arise.       [id number] [facility] Chris Hurtado., M.S., Th.M.  Spiritual Care Provider   Paging Service [phone])

## 2021-05-13 NOTE — H&P
Hospitalist Admission Note    NAME: April Georges   :  1935   MRN:  [id number]     Date/Time:  2021 4:12 PM    Patient PCP: Cira Jacobs MD  Rheum:  Dr. Jovany Hawley  Optho:  Dr. Maday Rodarte  Neurologist:  Dr. Melissa Bui    Please note that this dictation was completed with [employer], the computer voice recognition software. Quite often unanticipated grammatical, syntax, homophones, and other interpretive errors are inadvertently transcribed by the computer software. Please disregard these errors. 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 Level 1 code stroke activated in ER but symptoms resolved by time teleneurologist had seen her and tpa not recommended. CT head with small vessel ischemic change and chronic ischemia in right frontal lobe. CTA head and neck without any LVO or vascular abnormalities. Was given solumedrol and benadryl for contrast allergy. --resume plavix (she was supposed to stop today for cataract surgery next week). Add aspirin 81mg. Ordered mri brain, echo, neurology consult  --check A1c, lipid panel    Microcytic anemia, POA hgb 8.9  Yarsanism  --hemoccult stool, iron profile, B12, ferritin. Repeat cbc in AM    Sjogren's disease  Lupus  Chronic dysphagia -- has lost 30 lbs since last year, saw neurology Dr. Melissa Bui for dysphagia  --taken off plaquenil since it was affecting her vision  --per patient, her rheumatologist considering some type of infusion therapy depending on her vision after cataract surgery    Hyperlipidemia  --continue lipitor    GERD  --continue PPI    Depression  --continue celexa        Body mass index is 19.83 kg/m².     Code: discussed, wants DNR/DNI  DVT prophylaxis: SCD  Surrogate decision maker:  Daughter and son          Subjective:   CHIEF COMPLAINT:  Difficulty speaking    HISTORY OF PRESENT ILLNESS:     Sekou Moyer is a 80 y.o. female with prior hx stroke 20 years ago, lupus, Sjogren's, hyperlipidemia, GERD, chronic dysphagia with acute onset of difficulty speaking at 10:30am while talking to her daughter when returning from a doctor's appointment. She knew what she wanted to say but knew that the words were incorrect and then had difficulty getting words out. This lasted for about 10-15 minutes. On arrival to ER, noted to have dysarthria and \"Patient had initially some mild expressive aphasia that resolved during her stay and was gone by the time the neurologist evaluation was completed. \"  Level 1 code stroke called in ER. Teleneurologist did not recommend tpa since symptoms resolved but recommended dual platelet therapy. Patient on plavix for prior stroke. She did not take it today (was supposed to stop for eye surgery next week). She stopped taking plaquenil about 10-14 days ago since there was concern it was affecting her vision. She denied any acute vision change, paresthesia, balance impairment or new focal weakness. Has left arm weakness from prior stroke. Been having mild dysuria for a few days, no fever.  +chills    We were asked to admit for work up and evaluation of the above problems.      Past Medical History:   Diagnosis Date    Arthritis     Arthropathy     Chronic pain     lupus    Depression     Essential hypertension     Fracture     back    GERD (gastroesophageal reflux disease)     High cholesterol     Ill-defined condition 04/2017    pneumonia    Lupus ([address]     Osteoporosis     Pneumonia     Refusal of blood transfusions as patient is Quaker     Rheumatoid arthritis, unspecified ([address]     Sjoegren syndrome     Stroke ([facility] [address] 1998    no residual weakness      Past Surgical History:   Procedure Laterality Date    HX BILATERAL SALPINGO-OOPHORECTOMY  2013    Dr. Flores Xie HX HEENT  10/2018    throat surgery    HX KYPHOPLASTY  02/2016    Pt states T12 and L1     Social History     Tobacco Use    Smoking status: Never Smoker    Smokeless tobacco: Never Used   Substance Use Topics    Alcohol use: Yes     Alcohol/week: 0.8 standard drinks     Types: 1 Glasses of wine per week     Comment: seldomly      Lives alone    Family History   Problem Relation Age of Onset    Other Mother         NPH    Stroke Mother     Osteoporosis Mother     Stroke Father     Bipolar Disorder Father     Heart Disease Father     Bipolar Disorder Child       Allergies   Allergen Reactions    Imuran [Azathioprine] Other (comments)     Lupus attack    Iodinated Contrast Media Hives    Gadavist [Gadobutrol] Hives and Itching     Scant hives to back post receiving injection of gadavist. Denied any breathing problems or throat swelling. Resolved within 30 minutes. MRI contrast Gadolinnium)        Prior to Admission medications    Medication Sig Start Date End Date Taking? Authorizing Provider   esomeprazole (NEXIUM) 40 mg capsule Take 40 mg by mouth daily (with lunch). 5/5/21  Yes Provider, Historical   acetaminophen (TYLENOL) 500 mg tablet Take 500 mg by mouth every six (6) hours as needed for Pain. Yes Provider, Historical   peg 400-propylene glycol (Systane, propylene glycoL,) 0.4-0.3 % drop Administer 1 Drop to both eyes two (2) times a day. Yes Provider, Historical   cetirizine (ZYRTEC) 5 mg tablet Take 5 mg by mouth daily. Yes Provider, Historical   vitamins  A,C,E-zinc-copper (Ocuvite PreserVision) 7,160 unit- 113 mg-100 unit tab tablet Take 2 Tabs by mouth daily. Yes Provider, Historical   baclofen (LIORESAL) 10 mg tablet Take  by mouth as needed. Yes Provider, Historical   diclofenac (VOLTAREN) 1 % topical gel Apply 4 g to affected area as needed.  To Knee and left thumb   Yes Provider, Historical   atorvastatin (LIPITOR) 40 mg tablet Take 20 mg by mouth daily (after lunch). 14  Yes Provider, Historical   citalopram (CELEXA) 20 mg tablet Take 20 mg by mouth daily (after lunch). 2/23/15  Yes Provider, Historical   spironolactone-hydrochlorothiazide (ALDACTAZIDE) 25-25 mg per tablet Take 0.5 Tabs by mouth daily (after lunch). 14  Yes Provider, Historical   clopidogrel (PLAVIX) 75 mg tablet Take 75 mg by mouth daily (after lunch). 3/20/15   Provider, Historical     REVIEW OF SYSTEMS:  POSITIVE= Bold. Negative = normal text  General:  fever, chills, sweats, generalized weakness, weight loss/gain, loss of appetite  Eyes:  blurred vision, eye pain, loss of vision, diplopia  Ear Nose and Throat:  rhinorrhea, pharyngitis  Respiratory:   cough, sputum production, SOB, wheezing, AVERY, pleuritic pain  Cardiology:  chest pain, palpitations, orthopnea, PND, edema, syncope   Gastrointestinal:  abdominal pain, N/V, dysphagia, diarrhea, constipation, bleeding  Genitourinary:  frequency, urgency, dysuria, hematuria, incontinence  Muskuloskeletal :  arthralgia, myalgia  Hematology:  easy bruising, bleeding, lymphadenopathy  Dermatological:  rash, ulceration, pruritis  Endocrine:  hot flashes or polydipsia  Neurological:  headache, dizziness, confusion, focal weakness, paresthesia, memory loss, gait disturbance  Psychological: anxiety, depression, agitation      Objective:   VITALS:    Visit Vitals  BP (!) 150/72   Pulse 64   Temp 97.6 °F (36.4 °C)   Resp 20   Ht 5' 4\" (1.626 m)   Wt 52.4 kg (115 lb 8.3 oz)   SpO2 100%   BMI 19.83 kg/m²     Temp (24hrs), Av.6 °F (36.4 °C), Min:97.6 °F (36.4 °C), Max:97.6 °F (36.4 °C)    Body mass index is 19.83 kg/m². PHYSICAL EXAM:    General:    Alert, cooperative, no distress, appears stated age. HEENT: Atraumatic, anicteric sclerae, pink conjunctivae     No oral ulcers, mucosa dry, throat clear. Hearing intact.   Neck:  Supple, symmetrical,  thyroid: non tender  Lungs:   Clear to auscultation bilaterally. No Wheezing or Rhonchi. No rales. Chest wall:  No tenderness  No Accessory muscle use. Heart:   Regular  rhythm,  No  murmur   No gallop. No edema. Abdomen:   Soft, non-tender. Not distended. Bowel sounds normal. No masses  Extremities: No cyanosis. No clubbing  Skin:     Not pale Not Jaundiced  No rashes   Psych:  Good insight. Not depressed. Not anxious or agitated. Neurologic: EOMs intact. No facial asymmetry. No aphasia or slurred speech. Symmetrical strength legs 4/5. Right arm 5/5, left arm 4/5, Alert and oriented X 3. IMAGING RESULTS:   []       I have personally reviewed the actual   []     CXR  []     CT scan  CXR:  CT :  EKG:SR   ________________________________________________________________________  Care Plan discussed with:    Comments   Patient y    Family  y Daughter bedside   RN     Care Manager                    Consultant:      ________________________________________________________________________  Prophylaxis:  GI PPI   DVT SCD   ________________________________________________________________________  Recommended Disposition:   Home with Family y   HH/PT/OT/RN    SNF/LTC    SWAPNIL    ________________________________________________________________________  Code Status:  Full Code    DNR/DNI y   ________________________________________________________________________  TOTAL TIME:  50 minutes      Comments     Reviewed previous records   >50% of visit spent in counseling and coordination of care  Discussion with patient and/or family and questions answered         ______________________________________________________________________  Berta Fonseca MD      Procedures: see electronic medical records for all procedures/Xrays and details which were not copied into this note but were reviewed prior to creation of Plan.     LAB DATA REVIEWED:    Recent Results (from the past 24 hour(s))   GLUCOSE, POC    Collection Time: 05/13/21 11:22 AM   Result Value Ref Range    Glucose (POC) 86 65 - 117 mg/dL    Performed by Francis Ser    CBC WITH AUTOMATED DIFF    Collection Time: 05/13/21 11:30 AM   Result Value Ref Range    WBC 4.5 3.6 - 11.0 K/uL    RBC 3.57 (L) 3.80 - 5.20 M/uL    HGB 8.9 (L) 11.5 - 16.0 g/dL    HCT 28.4 (L) 35.0 - 47.0 %    MCV 79.6 (L) 80.0 - 99.0 FL    MCH 24.9 (L) 26.0 - 34.0 PG    MCHC 31.3 30.0 - 36.5 g/dL    RDW 15.9 (H) 11.5 - 14.5 %    PLATELET 410 469 - 973 K/uL    MPV 9.9 8.9 - 12.9 FL    NRBC 0.0 0  WBC    ABSOLUTE NRBC 0.00 0.00 - 0.01 K/uL    NEUTROPHILS 49 32 - 75 %    LYMPHOCYTES 33 12 - 49 %    MONOCYTES 13 5 - 13 %    EOSINOPHILS 5 0 - 7 %    BASOPHILS 0 0 - 1 %    IMMATURE GRANULOCYTES 0 0.0 - 0.5 %    ABS. NEUTROPHILS 2.2 1.8 - 8.0 K/UL    ABS. LYMPHOCYTES 1.5 0.8 - 3.5 K/UL    ABS. MONOCYTES 0.6 0.0 - 1.0 K/UL    ABS. EOSINOPHILS 0.2 0.0 - 0.4 K/UL    ABS. BASOPHILS 0.0 0.0 - 0.1 K/UL    ABS. IMM. GRANS. 0.0 0.00 - 0.04 K/UL    DF AUTOMATED     METABOLIC PANEL, COMPREHENSIVE    Collection Time: 05/13/21 11:30 AM   Result Value Ref Range    Sodium 136 136 - 145 mmol/L    Potassium 3.9 3.5 - 5.1 mmol/L    Chloride 103 97 - 108 mmol/L    CO2 28 21 - 32 mmol/L    Anion gap 5 5 - 15 mmol/L    Glucose 77 65 - 100 mg/dL    BUN 15 6 - 20 MG/DL    Creatinine 0.65 0.55 - 1.02 MG/DL    BUN/Creatinine ratio 23 (H) 12 - 20      GFR est AA >60 >60 ml/min/1.73m2    GFR est non-AA >60 >60 ml/min/1.73m2    Calcium 8.9 8.5 - 10.1 MG/DL    Bilirubin, total 0.5 0.2 - 1.0 MG/DL    ALT (SGPT) 15 12 - 78 U/L    AST (SGOT) 14 (L) 15 - 37 U/L    Alk.  phosphatase 81 45 - 117 U/L    Protein, total 7.1 6.4 - 8.2 g/dL    Albumin 3.4 (L) 3.5 - 5.0 g/dL    Globulin 3.7 2.0 - 4.0 g/dL    A-G Ratio 0.9 (L) 1.1 - 2.2     PROTHROMBIN TIME + INR    Collection Time: 05/13/21 11:30 AM   Result Value Ref Range    INR 1.1 0.9 - 1.1      Prothrombin time 11.3 (H) 9.0 - 11.1 sec   TROPONIN I    Collection Time: 05/13/21 11:30 AM   Result Value Ref Range    Troponin-I, Qt. <0.05 <0.05 ng/mL   EKG, 12 LEAD, INITIAL    Collection Time: 05/13/21 12:22 PM   Result Value Ref Range    Ventricular Rate 61 BPM    Atrial Rate 61 BPM    P-R Interval 142 ms    QRS Duration 96 ms    Q-T Interval 462 ms    QTC Calculation (Bezet) 465 ms    Calculated P Axis 56 degrees    Calculated R Axis -33 degrees    Calculated T Axis 51 degrees    Diagnosis       Sinus rhythm with premature supraventricular complexes  Left axis deviation  When compared with ECG of 17-MAY-2017 05:52,  premature supraventricular complexes are now present     URINALYSIS W/ REFLEX CULTURE    Collection Time: 05/13/21  2:55 PM    Specimen: Urine   Result Value Ref Range    Color YELLOW/STRAW      Appearance CLEAR CLEAR      Specific gravity 1.028 1.003 - 1.030      pH (UA) 6.5 5.0 - 8.0      Protein Negative NEG mg/dL    Glucose Negative NEG mg/dL    Ketone Negative NEG mg/dL    Bilirubin Negative NEG      Blood Negative NEG      Urobilinogen 1.0 0.2 - 1.0 EU/dL    Nitrites Negative NEG      Leukocyte Esterase Negative NEG      WBC 0-4 0 - 4 /hpf    RBC 0-5 0 - 5 /hpf    Epithelial cells FEW FEW /lpf    Bacteria 2+ (A) NEG /hpf    UA:UC IF INDICATED CULTURE NOT INDICATED BY UA RESULT CNI      Hyaline cast 0-2 0 - 5 /lpf

## 2021-05-13 NOTE — PROGRESS NOTES
End of Shift Note    Bedside shift change report given to AMANDA KAY (oncoming nurse) by Zully Miller RN (offgoing nurse).   Report included the following information SBAR    Shift worked:  DAYS   Shift summary and any significant changes:     -NEW ADMIT   -MRI SCREENING FORM NOT COMPLETE       Concerns for physician to address:  NONE   Zone phone for oncoming shift:   [phone]     Patient Information  Caro Pospisil  80 y.o.  5/13/2021 11:24 AM by Venus Jennings MD. Marti Templeton was admitted from Home    Problem List  Patient Active Problem List    Diagnosis Date Noted    Stroke (cerebrum) ([address] 05/13/2021    Dysarthria 05/13/2021    Combined forms of age-related cataract of left eye 05/13/2021    History of stroke 08/27/2019    Cerebral microvascular disease 06/02/2016    Localization-related partial epilepsy with complex partial seizures ([address] 06/02/2016    Stenosis of both carotid arteries without cerebral infarction 06/02/2016    Abnormal involuntary movement, right arm 06/02/2016    Transient ischemic attack involving left internal carotid artery 06/02/2016    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.2) 04/09/2015    Dysphagia 03/24/2015    Myopathy 03/24/2015    Osteoporosis 03/24/2015    Type II or unspecified type diabetes mellitus with neurological manifestations, not stated as uncontrolled(250.60) ([address] 03/24/2015    Hypothyroid 03/24/2015    Dysarthria due to recent stroke 03/24/2015    Dysarthria 03/24/2015     Past Medical History:   Diagnosis Date    Arthritis     Arthropathy     Chronic pain     lupus    Depression     Essential hypertension     Fracture     back    GERD (gastroesophageal reflux disease)     High cholesterol     Ill-defined condition 04/2017    pneumonia    Lupus ([address]     Osteoporosis     Pneumonia     Refusal of blood transfusions as patient is Religious     Rheumatoid arthritis, unspecified ([address]     Sjoegren syndrome     Stroke ([address] 1998    no residual weakness       Core Measures:  CVA: Yes Yes  CHF:No No    Activity:     Number times ambulated in hallways past shift: 0  Number of times OOB to chair past shift: 1    Cardiac:   Cardiac Monitoring: Yes      Cardiac Rhythm: Sinus Rhythm    Access:   Current line(s): PIV     Genitourinary:   Urinary status: voiding    Respiratory:   O2 Device: None (Room air)  Chronic home O2 use?: N/A  Incentive spirometer at bedside: N/A       GI:  Last Bowel Movement Date: 05/13/21  Current diet:  DIET NPO Except Meds  Passing flatus: NO  Tolerating current diet: YES       Pain Management:   Patient states pain is manageable on current regimen: YES    Skin:  Gil Score: 21  Interventions: turn team and PT/OT consult    Patient Safety:  Fall Score:  Total Score: 2  Interventions: bed/chair alarm, gripper socks, pt to call before getting OOB and stay with me (per policy)     @Rollbelt  @dexterity to release roll belt  Yes/No ( must document dexterity  here by stating Yes or No here, otherwise this is a restraint and must follow restraint documentation policy.)    DVT prophylaxis:  DVT prophylaxis Med- Yes    Active Consults:  IP CONSULT TO HOSPITALIST  IP CONSULT TO NEUROLOGY    Length of Stay:  Expected LOS: - - -  Actual LOS: 0  Discharge Plan: Yes SNF      Carey Carver RN

## 2021-05-13 NOTE — ED PROVIDER NOTES
EMERGENCY DEPARTMENT HISTORY AND PHYSICAL EXAM      Date: 5/13/2021  Patient Name: Karyn Uriarte    History of Presenting Illness     Chief Complaint   Patient presents with    Aphasia     Expressive aphasia       History Provided By: Patient and EMS    HPI: Karyn Uriarte, 80 y.o. female with a past medical history significant for history of stroke, history of carotid stenosis, GERD, hyperlipidemia, lupus, multiple medical problems as stated below presents to the ED with cc of severe expressive aphasia began approximately 1030 this morning. Patient was with her daughter when this happened and his daughter can understand her. She recently stopped her Plavix 10 days ago in preparation for cataract surgery that is upcoming. EMS was called her blood sugar was 68. In route she was found to slightly improved in her speech. She denies any headache, arm or leg weakness, arm or leg numbness, but just mild to moderate fatigue. No recent fevers or chills or other associated symptoms. No other exacerbating ameliorating factors. There are no other complaints, changes, or physical findings at this time. PCP: Abdi Washburn MD    No current facility-administered medications on file prior to encounter. Current Outpatient Medications on File Prior to Encounter   Medication Sig Dispense Refill    esomeprazole (NEXIUM) 40 mg capsule Take 40 mg by mouth daily (with lunch).  acetaminophen (TYLENOL) 500 mg tablet Take 500 mg by mouth every six (6) hours as needed for Pain.  peg 400-propylene glycol (Systane, propylene glycoL,) 0.4-0.3 % drop Administer 1 Drop to both eyes two (2) times a day.  cetirizine (ZYRTEC) 5 mg tablet Take 5 mg by mouth daily.  vitamins  A,C,E-zinc-copper (Ocuvite PreserVision) 7,160 unit- 113 mg-100 unit tab tablet Take 2 Tabs by mouth daily.  baclofen (LIORESAL) 10 mg tablet Take  by mouth as needed.       diclofenac (VOLTAREN) 1 % topical gel Apply 4 g to affected area as needed. To Knee and left thumb      atorvastatin (LIPITOR) 40 mg tablet Take 20 mg by mouth daily (after lunch).  citalopram (CELEXA) 20 mg tablet Take 20 mg by mouth daily (after lunch).  spironolactone-hydrochlorothiazide (ALDACTAZIDE) 25-25 mg per tablet Take 0.5 Tabs by mouth daily (after lunch).  [DISCONTINUED] omeprazole (PRILOSEC) 40 mg capsule Take 40 mg by mouth daily.  [DISCONTINUED] montelukast (SINGULAIR) 10 mg tablet Take 10 mg by mouth daily.  clopidogrel (PLAVIX) 75 mg tablet Take 75 mg by mouth daily (after lunch). Past History     Past Medical History:  Past Medical History:   Diagnosis Date    Arthritis     Arthropathy     Chronic pain     lupus    Depression     Essential hypertension     Fracture     back    GERD (gastroesophageal reflux disease)     High cholesterol     Ill-defined condition 04/2017    pneumonia    Lupus ([facility] [address]     Osteoporosis     Pneumonia     Refusal of blood transfusions as patient is [employer]     Rheumatoid arthritis, unspecified ([facility] [address]     Sjoegren syndrome     Stroke ([facility] [address] 1998    no residual weakness       Past Surgical History:  Past Surgical History:   Procedure Laterality Date    HX BILATERAL SALPINGO-OOPHORECTOMY  2013    Dr. Joyce Maxwell    HX ENDOSCOPY      HX HEENT  10/2018    throat surgery    HX KYPHOPLASTY  02/2016    Pt states T12 and L1       Family History:  Family History   Problem Relation Age of Onset    Other Mother         NPH    Stroke Mother     Osteoporosis Mother     Stroke Father     Bipolar Disorder Father     Heart Disease Father     Bipolar Disorder Child        Social History:  Social History     Tobacco Use    Smoking status: Never Smoker    Smokeless tobacco: Never Used   Substance Use Topics    Alcohol use: Yes     Alcohol/week: 0.8 standard drinks     Types: 1 Glasses of wine per week     Comment: seldomly    Drug use: No       Allergies:   Allergies Allergen Reactions    Imuran [Azathioprine] Other (comments)     Lupus attack    Iodinated Contrast Media Hives    Gadavist [Gadobutrol] Hives and Itching     Scant hives to back post receiving injection of gadavist. Denied any breathing problems or throat swelling. Resolved within 30 minutes. MRI contrast Gadolinnium)         Review of Systems   Review of Systems   Constitutional: Negative for chills, diaphoresis, fatigue and fever. HENT: Negative for ear pain and sore throat. Eyes: Negative for pain and redness. Respiratory: Negative for cough and shortness of breath. Cardiovascular: Negative for chest pain and leg swelling. Gastrointestinal: Negative for abdominal pain, diarrhea, nausea and vomiting. Endocrine: Negative for cold intolerance and heat intolerance. Genitourinary: Negative for flank pain and hematuria. Musculoskeletal: Negative for back pain and neck stiffness. Skin: Negative for rash and wound. Neurological: Positive for speech difficulty. Negative for dizziness, syncope and headaches. All other systems reviewed and are negative. Physical Exam   Physical Exam  Vitals signs and nursing note reviewed. Constitutional:       Appearance: She is well-developed. HENT:      Head: Normocephalic and atraumatic. Mouth/Throat:      Pharynx: No oropharyngeal exudate. Eyes:      Conjunctiva/sclera: Conjunctivae normal.      Pupils: Pupils are equal, round, and reactive to light. Neck:      Musculoskeletal: Normal range of motion. Cardiovascular:      Rate and Rhythm: Normal rate and regular rhythm. Heart sounds: No murmur. Pulmonary:      Effort: Pulmonary effort is normal. No respiratory distress. Breath sounds: Normal breath sounds. No wheezing. Abdominal:      General: Bowel sounds are normal. There is no distension. Palpations: Abdomen is soft. Tenderness: There is no abdominal tenderness. Musculoskeletal: Normal range of motion. General: No deformity. Skin:     General: Skin is warm and dry. Findings: No rash. Neurological:      Mental Status: She is alert and oriented to person, place, and time. GCS: GCS eye subscore is 4. GCS verbal subscore is 5. GCS motor subscore is 6. Cranial Nerves: Dysarthria present. No facial asymmetry. Sensory: No sensory deficit. Motor: No tremor or pronator drift. Coordination: Coordination normal.      Comments: Patient had initially some mild expressive aphasia that resolved during her stay and was gone by the time the neurologist evaluation was completed. Psychiatric:         Behavior: Behavior normal.         Diagnostic Study Results     Labs -     Recent Results (from the past 24 hour(s))   GLUCOSE, POC    Collection Time: 05/13/21 11:22 AM   Result Value Ref Range    Glucose (POC) 86 65 - 117 mg/dL    Performed by [facility]    CBC WITH AUTOMATED DIFF    Collection Time: 05/13/21 11:30 AM   Result Value Ref Range    WBC 4.5 3.6 - 11.0 K/uL    RBC 3.57 (L) 3.80 - 5.20 M/uL    HGB 8.9 (L) 11.5 - 16.0 g/dL    HCT 28.4 (L) 35.0 - 47.0 %    MCV 79.6 (L) 80.0 - 99.0 FL    MCH 24.9 (L) 26.0 - 34.0 PG    MCHC 31.3 30.0 - 36.5 g/dL    RDW 15.9 (H) 11.5 - 14.5 %    PLATELET 873 748 - 347 K/uL    MPV 9.9 8.9 - 12.9 FL    NRBC 0.0 0  WBC    ABSOLUTE NRBC 0.00 0.00 - 0.01 K/uL    NEUTROPHILS 49 32 - 75 %    LYMPHOCYTES 33 12 - 49 %    MONOCYTES 13 5 - 13 %    EOSINOPHILS 5 0 - 7 %    BASOPHILS 0 0 - 1 %    IMMATURE GRANULOCYTES 0 0.0 - 0.5 %    ABS. NEUTROPHILS 2.2 1.8 - 8.0 K/UL    ABS. LYMPHOCYTES 1.5 0.8 - 3.5 K/UL    ABS. MONOCYTES 0.6 0.0 - 1.0 K/UL    ABS. EOSINOPHILS 0.2 0.0 - 0.4 K/UL    ABS. BASOPHILS 0.0 0.0 - 0.1 K/UL    ABS. IMM.  GRANS. 0.0 0.00 - 0.04 K/UL    DF AUTOMATED     METABOLIC PANEL, COMPREHENSIVE    Collection Time: 05/13/21 11:30 AM   Result Value Ref Range    Sodium 136 136 - 145 mmol/L    Potassium 3.9 3.5 - 5.1 mmol/L    Chloride 103 97 - 108 mmol/L    CO2 28 21 - 32 mmol/L    Anion gap 5 5 - 15 mmol/L    Glucose 77 65 - 100 mg/dL    BUN 15 6 - 20 MG/DL    Creatinine 0.65 0.55 - 1.02 MG/DL    BUN/Creatinine ratio 23 (H) 12 - 20      GFR est AA >60 >60 ml/min/1.73m2    GFR est non-AA >60 >60 ml/min/1.73m2    Calcium 8.9 8.5 - 10.1 MG/DL    Bilirubin, total 0.5 0.2 - 1.0 MG/DL    ALT (SGPT) 15 12 - 78 U/L    AST (SGOT) 14 (L) 15 - 37 U/L    Alk. phosphatase 81 45 - 117 U/L    Protein, total 7.1 6.4 - 8.2 g/dL    Albumin 3.4 (L) 3.5 - 5.0 g/dL    Globulin 3.7 2.0 - 4.0 g/dL    A-G Ratio 0.9 (L) 1.1 - 2.2     PROTHROMBIN TIME + INR    Collection Time: 05/13/21 11:30 AM   Result Value Ref Range    INR 1.1 0.9 - 1.1      Prothrombin time 11.3 (H) 9.0 - 11.1 sec   TROPONIN I    Collection Time: 05/13/21 11:30 AM   Result Value Ref Range    Troponin-I, Qt. <0.05 <0.05 ng/mL   EKG, 12 LEAD, INITIAL    Collection Time: 05/13/21 12:22 PM   Result Value Ref Range    Ventricular Rate 61 BPM    Atrial Rate 61 BPM    P-R Interval 142 ms    QRS Duration 96 ms    Q-T Interval 462 ms    QTC Calculation (Bezet) 465 ms    Calculated P Axis 56 degrees    Calculated R Axis -33 degrees    Calculated T Axis 51 degrees    Diagnosis       Sinus rhythm with premature supraventricular complexes  Left axis deviation  When compared with ECG of 17-MAY-2017 05:52,  premature supraventricular complexes are now present         Radiologic Studies -   XR CHEST PORT   Final Result   No acute findings. CTA CODE NEURO HEAD AND NECK W CONT   Final Result   No significant vascular abnormalities demonstrated. CT CODE NEURO PERF W CBF   Final Result    impression: No acute findings suspected. CT CODE NEURO HEAD WO CONTRAST   Final Result   Small vessel ischemic changes. Focal chronic ischemia right frontal lobe. No   acute abnormality.               CT Results  (Last 48 hours)               05/13/21 1210  CTA CODE NEURO HEAD AND NECK W CONT Final result    Impression:  No significant vascular abnormalities demonstrated. Narrative:  Clinical indication: Code stroke. CTA of the head and neck obtained after intravenous injection of 100 cc of   Isovue-370 with review of the raw data and MIP reconstructions. No prior. CT   dose reduction was achieved through the use of a standardized protocol tailored   for this examination and automatic exposure control for dose modulation . Genia Faustino Head CT obtained after intravenous contrast show no enhancing lesion. NASCET criteria. Origins of the vessels from the thoracic arch show no significant stenosis or. Is mild narrowing of the origin of the left vertebral artery which is a dominant   vessel in the neck. There is no significant carotid bifurcation disease. The basilar artery appears unremarkable. Intracranially there is no large vessel occlusion filling defect or vascular   malformation. Next           05/13/21 1210  CT CODE NEURO PERF W CBF Final result    Impression:   impression: No acute findings suspected. Narrative:  Clinical data indication: CODE S.       brain perfusion was performed with generation of hemodynamic maps of multiple   parameters, including cerebral blood flow, cerebral blood volume, and MTT (mean   transit time). CT dose reduction was achieved through use of a standardized   protocol tailored for this examination and automatic exposure control for dose   modulation. No acute findings. Decreased cerebral blood flow right frontal   compatible with a remote infarct. 05/13/21 1130  CT CODE NEURO HEAD WO CONTRAST Final result    Impression:  Small vessel ischemic changes. Focal chronic ischemia right frontal lobe. No   acute abnormality. Narrative:  EXAM: CT CODE NEURO HEAD WO CONTRAST       INDICATION: Expressive aphasia       COMPARISON: None. CONTRAST: None. TECHNIQUE: Unenhanced CT of the head was performed using 5 mm images.  Brain and   bone windows were generated. Coronal and sagittal reformats. CT dose reduction   was achieved through use of a standardized protocol tailored for this   examination and automatic exposure control for dose modulation. FINDINGS:   The ventricles and sulci are normal in size, shape and configuration. . Small   vessel ischemic changes are seen in the periventricular white matter. Focal   chronic ischemia is noted in the right frontal lobe with ex vacuo phenomenon of   the frontal horn. There is no intracranial hemorrhage, extra-axial collection,   or mass effect. The basilar cisterns are open. No CT evidence of acute infarct. The bone windows demonstrate no abnormalities. The visualized portions of the   paranasal sinuses and mastoid air cells are clear. Vascular calcification is   noted. CXR Results  (Last 48 hours)               05/13/21 1330  XR CHEST PORT Final result    Impression:  No acute findings. Narrative:  EXAM: XR CHEST PORT       INDICATION: stroke       COMPARISON: 7/5/2017 CT, 12/19/2016 radiographs       FINDINGS: A portable AP radiograph of the chest was obtained at 1317 hours. There is no pneumothorax or pleural effusion. The lungs are clear. Cardiac size   is upper limits of normal. There is mild-moderate thoracic aortic tortuosity   again shown with otherwise normal mediastinal and hilar contour. 2 segments of   vertebroplasty cement are again shown at the thoracolumbar junction. Medical Decision Making   I am the first provider for this patient. I reviewed the vital signs, available nursing notes, past medical history, past surgical history, family history and social history. Vital Signs-Reviewed the patient's vital signs.   Patient Vitals for the past 12 hrs:   Temp Pulse Resp BP SpO2   05/13/21 1300 -- (!) 57 17 (!) 185/73 100 %   05/13/21 1230 -- 65 13 (!) 187/76 98 %   05/13/21 1228 97.6 °F (36.4 °C) 60 17 (!) 187/76 100 %   05/13/21 1146 -- -- -- June Negus 205/91 --   05/13/21 1144 -- 63 16 (!) 196/88 100 %       Records Reviewed: Nursing records and medical records reviewed    MDM:  Patient presents with stroke like symptoms and seen immediately by me on arrival. Spoke with EMS and/or family regarding symptoms, Time of onset, vitals and normal glucose. DDx: ischemic vs. Hemorrhagic stroke, complex migraine, javier's paralysis. Given the history and physical, will get a CT head and Neurology consult to determine if tPA warranted. Will continue to monitor closely in the ED and will ultimately need admission for further w/u. Provider Notes (Medical Decision Making):   72-year-old female presenting with symptoms c consistent with an acute CVA. Patient's expressive aphasia was quite significant in the field but completely resolved by the time of her teleneurology evaluation. Based on the improvement in symptoms patient was not an IV TPA candidate but had a CTA performed which showed no evidence of large vessel occlusion. No metabolic causes to her symptoms. Will admit for further work-up and management. ED Course:   Initial assessment performed. The patients presenting problems have been discussed, and they are in agreement with the care plan formulated and outlined with them. I have encouraged them to ask questions as they arise throughout their visit. ED Course as of May 13 1459   Thu May 13, 2021   1128 Saw patient in [facility], called level 1 code stroke based on LKW of 10:30, new expressive aphasia. Paged teleneurology. [CC]   1136 CT scan has been completed, patient resting and waiting in radiology for teleneurology evaluation. Daughter is at the bedside and we are reassessing the patient. We will repage teleneurology. [CC]   1140 Spoke with teleneurology, Dr. Iona Khan. He is coming on the camera now. [CC]   1200 Teleneurology evaluation complete. TPA is not recommended due to patient's benign neurologic exam at this time.   He does recommend CT angiogram of the head and neck, MRI, admission for full stroke work-up, and loading with both aspirin and Plavix if CTA is negative. Additionally he recommends some baseline fluids to hydrate her.    [CC]      ED Course User Index  [CC] Parish Pineda MD     Pulse Oximetry Analysis - Normal 98% on room air    Cardiac Monitor:   Rate: 85  Rhythm: Normal Sinus Rhythm                    Critical Care:    I have spent 40 minutes of critical care time in evaluating and treating this patient. This includes time spent at bedside, time with family and decision makers, documentation, review of labs and imaging, and/or consultation with specialists. It does not include time spent on separately billed procedures. This patient presents with a critical illness or injury that acutely impairs one or more vital organ systems such that there is a high probability of imminent or life threatening deterioration in the patient's condition. This case involved decision making of high complexity to assess, manipulate, and support vital organ system failure and/or to prevent further life threatening deterioration of the patient's condition. Failure to initiate these interventions on an urgent basis would likely result in sudden, clinically significant or life threatening deterioration in the patient's condition. Abnormal findings supporting critical care: Acute findings of a stroke within the TPA window  Interventions to support critical care: Teleneurology evaluation code stroke activation, serial assessments, consideration of ITB TPA  Failure to intervene may result in: Worsening neurologic function        Disposition:  Admit Note:  3:08 PM  Pt is being admitted by hospitalist. The results of their tests and reason(s) for their admission have been discussed with pt and/or available family. They convey agreement and understanding for the need to be admitted and for admission diagnosis.             Diagnosis Clinical Impression:   1. Acute CVA (cerebrovascular accident) [facility])        Attestations:    Sulema Street MD    Please note that this dictation was completed with [employer], the computer voice recognition software. Quite often unanticipated grammatical, syntax, homophones, and other interpretive errors are inadvertently transcribed by the computer software. Please disregard these errors. Please excuse any errors that have escaped final proofreading. Thank you.

## 2021-05-13 NOTE — PROGRESS NOTES
MRI Screening form not completed due to patients questionable orientation. Called son, who is emergency contact, to complete MRI screening form but no answer. Voicemail was left.

## 2021-05-13 NOTE — ED NOTES
Pt in ED room, on the monitor x 3, daughter at bedside. EKG obtained at this time. Pt placed on PureWick. 1430 Pt requesting water or food. Per pt, pt has hx of dysphagia w/ esophageal stretching. Pt fails dysphagia screening, provided w/ sponges to wet mucosa instead, education provided. 1510 Verbal order from MD for speech consult, will continue to keep pt NPO until consult performed. 1635 TRANSFER - OUT REPORT:    Verbal report given to [facility] (name) on Caro Lawson  being transferred to neuro (unit) for routine progression of care       Report consisted of patients Situation, Background, Assessment and   Recommendations(SBAR). Information from the following report(s) SBAR, Kardex, ED Summary, [facility] and Recent Results was reviewed with the receiving nurse. Lines:   Peripheral IV 05/13/21 Left Antecubital (Active)   Site Assessment Clean, dry, & intact 05/13/21 1147   Phlebitis Assessment 0 05/13/21 1147   Infiltration Assessment 0 05/13/21 1147   Dressing Status Clean, dry, & intact 05/13/21 1147   Hub Color/Line Status Green 05/13/21 1147   Action Taken Blood drawn 05/13/21 1147        Opportunity for questions and clarification was provided.       Patient to be transported with:   Sulmaq

## 2021-05-13 NOTE — PROGRESS NOTES
Pharmacy Medication Reconciliation     The patient was interviewed regarding current PTA medication list, use and drug allergies;  patient and patient's daughter present in room and obtained permission from patient to discuss drug regimen with visitor(s) present. The patient was questioned regarding use of any other inhalers, topical products, over the counter medications, herbal medications, vitamin products or ophthalmic/nasal/otic medication use. Allergy Update: Imuran [azathioprine], Iodinated contrast media, and Gadavist [gadobutrol]    Recommendations/Findings: The following amendments were made to the patient's active medication list on file at [address]:   1) Additions:   +esomeprazole  +acetaminophen prn  +systane  +zyrtec childrens  +preservision    2) Deletions:   -omeprazole  -montelukast    3) Changes: none    Pertinent Findings:   -plavix on hold for eye surgery (cataract) on 5/19/21  -hasn't started moxifloxacin or prelone eye drops as those are to be started 3 days prior to eye surgery on 5/19/21    -Clarified PTA med list with patient/patient's daughter, RX query. PTA medication list was corrected to the following:     Prior to Admission Medications   Prescriptions Last Dose Informant Taking?   acetaminophen (TYLENOL) 500 mg tablet  at . .. Self Yes   Sig: Take 500 mg by mouth every six (6) hours as needed for Pain. atorvastatin (LIPITOR) 40 mg tablet 5/12/2021 at Unknown time Self Yes   Sig: Take 20 mg by mouth daily (after lunch). baclofen (LIORESAL) 10 mg tablet  Self Yes   Sig: Take  by mouth as needed. cetirizine (ZYRTEC) 5 mg tablet 5/12/2021 at Unknown time Self Yes   Sig: Take 5 mg by mouth daily. citalopram (CELEXA) 20 mg tablet 5/12/2021 at Unknown time Self Yes   Sig: Take 20 mg by mouth daily (after lunch). clopidogrel (PLAVIX) 75 mg tablet  Self No   Sig: Take 75 mg by mouth daily (after lunch).    diclofenac (VOLTAREN) 1 % topical gel  Self Yes   Sig: Apply 4 g to affected area as needed. To Knee and left thumb   esomeprazole (NEXIUM) 40 mg capsule  Self Yes   Sig: Take 40 mg by mouth daily (with lunch). peg 400-propylene glycol (Systane, propylene glycoL,) 0.4-0.3 % drop 5/12/2021 Self Yes   Sig: Administer 1 Drop to both eyes two (2) times a day. spironolactone-hydrochlorothiazide (ALDACTAZIDE) 25-25 mg per tablet 5/12/2021 at 1200 Self Yes   Sig: Take 0.5 Tabs by mouth daily (after lunch). vitamins  A,C,E-zinc-copper (Ocuvite PreserVision) 7,160 unit- 113 mg-100 unit tab tablet 5/12/2021 at Unknown time Self Yes   Sig: Take 2 Tabs by mouth daily.       Facility-Administered Medications: None        Thank you,  JAYMIE Christianson

## 2021-05-14 ENCOUNTER — APPOINTMENT (OUTPATIENT)
Dept: NON INVASIVE DIAGNOSTICS | Age: 86
DRG: 069 | End: 2021-05-14
Attending: STUDENT IN AN ORGANIZED HEALTH CARE EDUCATION/TRAINING PROGRAM
Payer: MEDICARE

## 2021-05-14 ENCOUNTER — APPOINTMENT (OUTPATIENT)
Dept: GENERAL RADIOLOGY | Age: 86
DRG: 069 | End: 2021-05-14
Attending: STUDENT IN AN ORGANIZED HEALTH CARE EDUCATION/TRAINING PROGRAM
Payer: MEDICARE

## 2021-05-14 ENCOUNTER — APPOINTMENT (OUTPATIENT)
Dept: MRI IMAGING | Age: 86
DRG: 069 | End: 2021-05-14
Attending: HOSPITALIST
Payer: MEDICARE

## 2021-05-14 LAB
ATRIAL RATE: 61 BPM
AV R PG: 110.21 MMHG
CALCULATED P AXIS, ECG09: 56 DEGREES
CALCULATED R AXIS, ECG10: -33 DEGREES
CALCULATED T AXIS, ECG11: 51 DEGREES
CHOLEST SERPL-MCNC: 159 MG/DL
DIAGNOSIS, 93000: NORMAL
ECHO AR MAX VEL PISA: 524.9 CM/S
ECHO AV AREA PEAK VELOCITY: 2.36 CM2
ECHO AV AREA VTI: 2.33 CM2
ECHO AV AREA/BSA PEAK VELOCITY: 1.5 CM2/M2
ECHO AV AREA/BSA VTI: 1.5 CM2/M2
ECHO AV MEAN GRADIENT: 3.84 MMHG
ECHO AV PEAK GRADIENT: 6.42 MMHG
ECHO AV PEAK VELOCITY: 126.73 CM/S
ECHO AV REGURGITANT PHT: 537.7 MS
ECHO AV VTI: 32.46 CM
ECHO EST RA PRESSURE: 10 MMHG
ECHO LA AREA 4C: 19.15 CM2
ECHO LA TO AORTIC ROOT RATIO: 0.92
ECHO LA VOL 4C: 58.43 ML (ref 22–52)
ECHO LA VOLUME INDEX A4C: 37.72 ML/M2 (ref 16–28)
ECHO LV E' LATERAL VELOCITY: 8.44 CM/S
ECHO LV E' SEPTAL VELOCITY: 5.01 CM/S
ECHO LV EDV A4C: 62.42 ML
ECHO LV EDV INDEX A4C: 40.3 ML/M2
ECHO LV EJECTION FRACTION A4C: 42 PERCENT
ECHO LV ESV A4C: 36.38 ML
ECHO LV ESV INDEX A4C: 23.5 ML/M2
ECHO LV INTERNAL DIMENSION DIASTOLIC: 4.38 CM (ref 3.9–5.3)
ECHO LV INTERNAL DIMENSION SYSTOLIC: 3.05 CM
ECHO LV IVSD: 0.89 CM (ref 0.6–0.9)
ECHO LV IVSS: 1.03 CM
ECHO LV MASS 2D: 129.9 G (ref 67–162)
ECHO LV MASS INDEX 2D: 83.9 G/M2 (ref 43–95)
ECHO LV POSTERIOR WALL DIASTOLIC: 0.94 CM (ref 0.6–0.9)
ECHO LV POSTERIOR WALL SYSTOLIC: 1.32 CM
ECHO LVOT CARDIAC OUTPUT: 4.34 LITER/MINUTE
ECHO LVOT DIAM: 2.14 CM
ECHO LVOT PEAK GRADIENT: 2.79 MMHG
ECHO LVOT PEAK VELOCITY: 83.47 CM/S
ECHO LVOT SV: 75.6 ML
ECHO LVOT VTI: 21.12 CM
ECHO MV A VELOCITY: 100.31 CM/S
ECHO MV E DECELERATION TIME (DT): 242 MS
ECHO MV E VELOCITY: 90.46 CM/S
ECHO MV E/A RATIO: 0.9
ECHO MV E/E' LATERAL: 10.72
ECHO MV E/E' RATIO (AVERAGED): 14.39
ECHO MV E/E' SEPTAL: 18.06
ECHO PV PEAK INSTANTANEOUS GRADIENT SYSTOLIC: 1.82 MMHG
ECHO PV REGURGITANT MAX VELOCITY: 67.54 CM/S
ECHO RIGHT VENTRICULAR SYSTOLIC PRESSURE (RVSP): 37.23 MMHG
ECHO RV INTERNAL DIMENSION: 3.86 CM
ECHO TV A WAVE: 34.63 CM/S
ECHO TV E WAVE: 58.34 CM/S
ECHO TV REGURGITANT MAX VELOCITY: 260.92 CM/S
ECHO TV REGURGITANT MAX VELOCITY: 425.56 CM/S
ECHO TV REGURGITANT PEAK GRADIENT: 27.23 MMHG
ERYTHROCYTE [DISTWIDTH] IN BLOOD BY AUTOMATED COUNT: 15.9 % (ref 11.5–14.5)
EST. AVERAGE GLUCOSE BLD GHB EST-MCNC: 114 MG/DL
FERRITIN SERPL-MCNC: 12 NG/ML (ref 8–252)
GLUCOSE BLD STRIP.AUTO-MCNC: 113 MG/DL (ref 65–117)
GLUCOSE BLD STRIP.AUTO-MCNC: 119 MG/DL (ref 65–117)
GLUCOSE BLD STRIP.AUTO-MCNC: 89 MG/DL (ref 65–117)
HBA1C MFR BLD: 5.6 % (ref 4–5.6)
HCT VFR BLD AUTO: 28.9 % (ref 35–47)
HDLC SERPL-MCNC: 64 MG/DL
HDLC SERPL: 2.5 {RATIO} (ref 0–5)
HGB BLD-MCNC: 9.3 G/DL (ref 11.5–16)
IRON SATN MFR SERPL: 10 % (ref 20–50)
IRON SERPL-MCNC: 33 UG/DL (ref 35–150)
LDLC SERPL CALC-MCNC: 84.2 MG/DL (ref 0–100)
LVOT MG: 1.6 MMHG
MCH RBC QN AUTO: 24.8 PG (ref 26–34)
MCHC RBC AUTO-ENTMCNC: 32.2 G/DL (ref 30–36.5)
MCV RBC AUTO: 77.1 FL (ref 80–99)
NRBC # BLD: 0 K/UL (ref 0–0.01)
NRBC BLD-RTO: 0 PER 100 WBC
P-R INTERVAL, ECG05: 142 MS
PLATELET # BLD AUTO: 247 K/UL (ref 150–400)
PMV BLD AUTO: 9.8 FL (ref 8.9–12.9)
Q-T INTERVAL, ECG07: 462 MS
QRS DURATION, ECG06: 96 MS
QTC CALCULATION (BEZET), ECG08: 465 MS
RBC # BLD AUTO: 3.75 M/UL (ref 3.8–5.2)
SERVICE CMNT-IMP: ABNORMAL
SERVICE CMNT-IMP: NORMAL
SERVICE CMNT-IMP: NORMAL
TIBC SERPL-MCNC: 344 UG/DL (ref 250–450)
TRIGL SERPL-MCNC: 54 MG/DL (ref ?–150)
VENTRICULAR RATE, ECG03: 61 BPM
VIT B12 SERPL-MCNC: 304 PG/ML (ref 193–986)
VLDLC SERPL CALC-MCNC: 10.8 MG/DL
WBC # BLD AUTO: 5.3 K/UL (ref 3.6–11)

## 2021-05-14 PROCEDURE — 97530 THERAPEUTIC ACTIVITIES: CPT

## 2021-05-14 PROCEDURE — 82728 ASSAY OF FERRITIN: CPT

## 2021-05-14 PROCEDURE — 36415 COLL VENOUS BLD VENIPUNCTURE: CPT

## 2021-05-14 PROCEDURE — 70551 MRI BRAIN STEM W/O DYE: CPT

## 2021-05-14 PROCEDURE — 92611 MOTION FLUOROSCOPY/SWALLOW: CPT | Performed by: SPEECH-LANGUAGE PATHOLOGIST

## 2021-05-14 PROCEDURE — 99223 1ST HOSP IP/OBS HIGH 75: CPT | Performed by: PSYCHIATRY & NEUROLOGY

## 2021-05-14 PROCEDURE — 93306 TTE W/DOPPLER COMPLETE: CPT

## 2021-05-14 PROCEDURE — 82962 GLUCOSE BLOOD TEST: CPT

## 2021-05-14 PROCEDURE — 74011250636 HC RX REV CODE- 250/636: Performed by: STUDENT IN AN ORGANIZED HEALTH CARE EDUCATION/TRAINING PROGRAM

## 2021-05-14 PROCEDURE — 74011250637 HC RX REV CODE- 250/637: Performed by: HOSPITALIST

## 2021-05-14 PROCEDURE — 74011250636 HC RX REV CODE- 250/636: Performed by: HOSPITALIST

## 2021-05-14 PROCEDURE — 85027 COMPLETE CBC AUTOMATED: CPT

## 2021-05-14 PROCEDURE — 74011250637 HC RX REV CODE- 250/637: Performed by: PSYCHIATRY & NEUROLOGY

## 2021-05-14 PROCEDURE — 80061 LIPID PANEL: CPT

## 2021-05-14 PROCEDURE — 92610 EVALUATE SWALLOWING FUNCTION: CPT | Performed by: SPEECH-LANGUAGE PATHOLOGIST

## 2021-05-14 PROCEDURE — 83036 HEMOGLOBIN GLYCOSYLATED A1C: CPT

## 2021-05-14 PROCEDURE — 82607 VITAMIN B-12: CPT

## 2021-05-14 PROCEDURE — 97161 PT EVAL LOW COMPLEX 20 MIN: CPT

## 2021-05-14 PROCEDURE — 92526 ORAL FUNCTION THERAPY: CPT | Performed by: SPEECH-LANGUAGE PATHOLOGIST

## 2021-05-14 PROCEDURE — 97165 OT EVAL LOW COMPLEX 30 MIN: CPT

## 2021-05-14 PROCEDURE — 65660000000 HC RM CCU STEPDOWN

## 2021-05-14 PROCEDURE — 2709999900 HC NON-CHARGEABLE SUPPLY

## 2021-05-14 PROCEDURE — 74230 X-RAY XM SWLNG FUNCJ C+: CPT

## 2021-05-14 PROCEDURE — 97116 GAIT TRAINING THERAPY: CPT

## 2021-05-14 PROCEDURE — 83540 ASSAY OF IRON: CPT

## 2021-05-14 RX ORDER — HYDROCHLOROTHIAZIDE 25 MG/1
12.5 TABLET ORAL
Status: DISCONTINUED | OUTPATIENT
Start: 2021-05-15 | End: 2021-05-15

## 2021-05-14 RX ORDER — ATORVASTATIN CALCIUM 40 MG/1
40 TABLET, FILM COATED ORAL
Status: DISCONTINUED | OUTPATIENT
Start: 2021-05-14 | End: 2021-05-15 | Stop reason: HOSPADM

## 2021-05-14 RX ORDER — LANOLIN ALCOHOL/MO/W.PET/CERES
1 CREAM (GRAM) TOPICAL
Status: DISCONTINUED | OUTPATIENT
Start: 2021-05-15 | End: 2021-05-15 | Stop reason: HOSPADM

## 2021-05-14 RX ORDER — SPIRONOLACTONE 25 MG/1
12.5 TABLET ORAL DAILY
Status: DISCONTINUED | OUTPATIENT
Start: 2021-05-15 | End: 2021-05-15

## 2021-05-14 RX ORDER — ENOXAPARIN SODIUM 100 MG/ML
30 INJECTION SUBCUTANEOUS EVERY 24 HOURS
Status: DISCONTINUED | OUTPATIENT
Start: 2021-05-14 | End: 2021-05-15 | Stop reason: HOSPADM

## 2021-05-14 RX ADMIN — CETIRIZINE HYDROCHLORIDE 5 MG: 10 TABLET, FILM COATED ORAL at 08:27

## 2021-05-14 RX ADMIN — CITALOPRAM HYDROBROMIDE 20 MG: 20 TABLET ORAL at 12:30

## 2021-05-14 RX ADMIN — ENOXAPARIN SODIUM 30 MG: 30 INJECTION SUBCUTANEOUS at 16:18

## 2021-05-14 RX ADMIN — ATORVASTATIN CALCIUM 40 MG: 40 TABLET, FILM COATED ORAL at 21:57

## 2021-05-14 RX ADMIN — CLOPIDOGREL BISULFATE 75 MG: 75 TABLET ORAL at 08:20

## 2021-05-14 RX ADMIN — MULTIPLE VITAMINS W/ MINERALS TAB 2 TABLET: TAB at 08:27

## 2021-05-14 RX ADMIN — ASPIRIN 81 MG: 81 TABLET, DELAYED RELEASE ORAL at 08:20

## 2021-05-14 RX ADMIN — SODIUM CHLORIDE 75 ML/HR: 9 INJECTION, SOLUTION INTRAVENOUS at 06:30

## 2021-05-14 RX ADMIN — PANTOPRAZOLE SODIUM 40 MG: 40 TABLET, DELAYED RELEASE ORAL at 08:20

## 2021-05-14 NOTE — PROGRESS NOTES
End of Shift Note    Bedside shift change report given to Stefan Zhong (oncoming nurse) by MINA Cisneros (offgoing nurse).   Report included the following information SBAR    Shift worked:  6415-1364   Shift summary and any significant changes:     MRI screening form completed, Hemoglobin 9.3       Concerns for physician to address:  None   Zone phone for oncoming shift:   [phone]     Patient Information  Caro Pospisil  80 y.o.  5/13/2021 11:24 AM by Venus Jennings MD. Marti Templeton was admitted from Home    Problem List  Patient Active Problem List    Diagnosis Date Noted    Stroke (cerebrum) ([address] 05/13/2021    Dysarthria 05/13/2021    Combined forms of age-related cataract of left eye 05/13/2021    History of stroke 08/27/2019    Cerebral microvascular disease 06/02/2016    Localization-related partial epilepsy with complex partial seizures ([address] 06/02/2016    Stenosis of both carotid arteries without cerebral infarction 06/02/2016    Abnormal involuntary movement, right arm 06/02/2016    Transient ischemic attack involving left internal carotid artery 06/02/2016    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.2) 04/09/2015    Dysphagia 03/24/2015    Myopathy 03/24/2015    Osteoporosis 03/24/2015    Type II or unspecified type diabetes mellitus with neurological manifestations, not stated as uncontrolled(250.60) ([address] 03/24/2015    Hypothyroid 03/24/2015    Dysarthria due to recent stroke 03/24/2015    Dysarthria 03/24/2015     Past Medical History:   Diagnosis Date    Arthritis     Arthropathy     Chronic pain     lupus    Depression     Essential hypertension     Fracture     back    GERD (gastroesophageal reflux disease)     High cholesterol     Ill-defined condition 04/2017    pneumonia    Lupus ([address]     Osteoporosis     Pneumonia     Refusal of blood transfusions as patient is Yazdanism     Rheumatoid arthritis, unspecified ([address]     Sjoegren syndrome     Stroke ([address] 1998    no residual weakness       Core Measures:  CVA: Yes Yes  CHF:No No  PNA:No No    Activity:  Activity Level: Up with Assistance  Number times ambulated in hallways past shift: 0  Number of times OOB to chair past shift: 1    Cardiac:   Cardiac Monitoring: Yes      Cardiac Rhythm: Sinus Rhythm    Access:   Current line(s): PIV     Genitourinary:   Urinary status: voiding    Respiratory:   O2 Device: None (Room air)  Chronic home O2 use?: N/A  Incentive spirometer at bedside: N/A       GI:  Last Bowel Movement Date: 05/13/21  Current diet:  DIET NPO Except Meds  Passing flatus: YES  Tolerating current diet: YES       Pain Management:   Patient states pain is manageable on current regimen: YES    Skin:  Gil Score: 17  Interventions: increase time out of bed and limit briefs    Patient Safety:  Fall Score:  Total Score: 3  Interventions: bed/chair alarm, gripper socks and pt to call before getting OOB  High Fall Risk: Yes  @Rollbelt  @dexterity to release roll belt  Yes/No ( must document dexterity  here by stating Yes or No here, otherwise this is a restraint and must follow restraint documentation policy.)    DVT prophylaxis:  DVT prophylaxis Med- Yes  DVT prophylaxis SCD or BRIDGET- No     Wounds: (If Applicable)  Wounds- No        Active Consults:  IP CONSULT TO HOSPITALIST  IP CONSULT TO NEUROLOGY    Length of Stay:  Expected LOS: - - -  Actual LOS: 1  Discharge Plan: No TBMINA Hoyt

## 2021-05-14 NOTE — PROGRESS NOTES
Problem: Falls - Risk of  Goal: *Absence of Falls  Description: Document Cristina Gamboa Fall Risk and appropriate interventions in the flowsheet.   Outcome: Progressing Towards Goal  Note: Fall Risk Interventions:  Mobility Interventions: Bed/chair exit alarm, Patient to call before getting OOB, Communicate number of staff needed for ambulation/transfer         Medication Interventions: Bed/chair exit alarm, Patient to call before getting OOB    Elimination Interventions: Bed/chair exit alarm, Call light in reach, Patient to call for help with toileting needs              Problem: Patient Education: Go to Patient Education Activity  Goal: Patient/Family Education  Outcome: Progressing Towards Goal     Problem: Patient Education: Go to Patient Education Activity  Goal: Patient/Family Education  Outcome: Progressing Towards Goal     Problem: TIA/CVA Stroke: 0-24 hours  Goal: Off Pathway (Use only if patient is Off Pathway)  Outcome: Progressing Towards Goal  Goal: Activity/Safety  Outcome: Progressing Towards Goal  Goal: Consults, if ordered  Outcome: Progressing Towards Goal  Goal: Diagnostic Test/Procedures  Outcome: Progressing Towards Goal  Goal: Nutrition/Diet  Outcome: Progressing Towards Goal  Goal: Discharge Planning  Outcome: Progressing Towards Goal  Goal: Medications  Outcome: Progressing Towards Goal  Goal: Respiratory  Outcome: Progressing Towards Goal  Goal: Treatments/Interventions/Procedures  Outcome: Progressing Towards Goal  Goal: Minimize risk of bleeding post-thrombolytic infusion  Outcome: Progressing Towards Goal  Goal: Monitor for complications post-thrombolytic infusion  Outcome: Progressing Towards Goal  Goal: Psychosocial  Outcome: Progressing Towards Goal  Goal: *Hemodynamically stable  Outcome: Progressing Towards Goal  Goal: *Neurologically stable  Description: Absence of additional neurological deficits    Outcome: Progressing Towards Goal  Goal: *Verbalizes anxiety and depression are reduced or absent  Outcome: Progressing Towards Goal  Goal: *Absence of Signs of Aspiration on Current Diet  Outcome: Progressing Towards Goal  Goal: *Absence of deep venous thrombosis signs and symptoms(Stroke Metric)  Outcome: Progressing Towards Goal  Goal: *Ability to perform ADLs and demonstrates progressive mobility and function  Outcome: Progressing Towards Goal  Goal: *Stroke education started(Stroke Metric)  Outcome: Progressing Towards Goal  Goal: *Dysphagia screen performed(Stroke Metric)  Outcome: Progressing Towards Goal  Goal: *Rehab consulted(Stroke Metric)  Outcome: Progressing Towards Goal     Problem: TIA/CVA Stroke: Day 2 Until Discharge  Goal: Off Pathway (Use only if patient is Off Pathway)  Outcome: Progressing Towards Goal  Goal: Activity/Safety  Outcome: Progressing Towards Goal  Goal: Diagnostic Test/Procedures  Outcome: Progressing Towards Goal  Goal: Nutrition/Diet  Outcome: Progressing Towards Goal  Goal: Discharge Planning  Outcome: Progressing Towards Goal  Goal: Medications  Outcome: Progressing Towards Goal  Goal: Respiratory  Outcome: Progressing Towards Goal  Goal: Treatments/Interventions/Procedures  Outcome: Progressing Towards Goal  Goal: Psychosocial  Outcome: Progressing Towards Goal  Goal: *Verbalizes anxiety and depression are reduced or absent  Outcome: Progressing Towards Goal  Goal: *Absence of aspiration  Outcome: Progressing Towards Goal  Goal: *Absence of deep venous thrombosis signs and symptoms(Stroke Metric)  Outcome: Progressing Towards Goal  Goal: *Optimal pain control at patient's stated goal  Outcome: Progressing Towards Goal  Goal: *Tolerating diet  Outcome: Progressing Towards Goal  Goal: *Ability to perform ADLs and demonstrates progressive mobility and function  Outcome: Progressing Towards Goal  Goal: *Stroke education continued(Stroke Metric)  Outcome: Progressing Towards Goal     Problem: Ischemic Stroke: Discharge Outcomes  Goal: *Verbalizes anxiety and depression are reduced or absent  Outcome: Progressing Towards Goal  Goal: *Verbalize understanding of risk factor modification(Stroke Metric)  Outcome: Progressing Towards Goal  Goal: *Hemodynamically stable  Outcome: Progressing Towards Goal  Goal: *Absence of aspiration pneumonia  Outcome: Progressing Towards Goal  Goal: *Aware of needed dietary changes  Outcome: Progressing Towards Goal  Goal: *Verbalize understanding of prescribed medications including anti-coagulants, anti-lipid, and/or anti-platelets(Stroke Metric)  Outcome: Progressing Towards Goal  Goal: *Tolerating diet  Outcome: Progressing Towards Goal  Goal: *Aware of follow-up diagnostics related to anticoagulants  Outcome: Progressing Towards Goal  Goal: *Ability to perform ADLs and demonstrates progressive mobility and function  Outcome: Progressing Towards Goal  Goal: *Absence of DVT(Stroke Metric)  Outcome: Progressing Towards Goal  Goal: *Absence of aspiration  Outcome: Progressing Towards Goal  Goal: *Optimal pain control at patient's stated goal  Outcome: Progressing Towards Goal  Goal: *Home safety concerns addressed  Outcome: Progressing Towards Goal  Goal: *Describes available resources and support systems  Outcome: Progressing Towards Goal  Goal: *Verbalizes understanding of activation of EMS(911) for stroke symptoms(Stroke Metric)  Outcome: Progressing Towards Goal  Goal: *Understands and describes signs and symptoms to report to providers(Stroke Metric)  Outcome: Progressing Towards Goal  Goal: *Neurolgocially stable (absence of additional neurological deficits)  Outcome: Progressing Towards Goal  Goal: *Verbalizes importance of follow-up with primary care physician(Stroke Metric)  Outcome: Progressing Towards Goal  Goal: *Smoking cessation discussed,if applicable(Stroke Metric)  Outcome: Progressing Towards Goal  Goal: *Depression screening completed(Stroke Metric)  Outcome: Progressing Towards Goal

## 2021-05-14 NOTE — PROGRESS NOTES
Speech Pathology  Swallow evaluation completed. Recommend NPO until MBS today. Full report to follow.

## 2021-05-14 NOTE — PROGRESS NOTES
Problem: Dysphagia (Adult)  Goal: *Acute Goals and Plan of Care (Insert Text)  Description: Speech Pathology Goals   Initiated 5/14/21  1. Patient will participate in MBS to further assess swallow functioning and safety. MET  2. Patient will tolerate a full liquid diet without s/s of aspiration/penetration with independent use of strategies/precautions    5/14/2021 1537 by Ori Tatum SLP  Outcome: Progressing Towards Goal    SPEECH [address] STUDY  Patient: Larisa Best (80 y.o. female)  Date: 5/14/2021  Primary Diagnosis: Dysarthria [R47.1]  Stroke (cerebrum) ([facility]) [I63.9]        Precautions: Aspiration       ASSESSMENT :  Based on the objective data described below, the patient presents with moderate oral and severe pharyngeal dysphagia, as characterized by disorganized bolus control and propulsion, decreased tongue base retraction, decreased hyolaryngeal excursion, decreased pharyngeal wall constriction, incomplete epiglottic inversion, and severely reduced relaxation of UES. She presented with flash penetration from pharyngeal residue, but no aspiration was observed. Patient has a history of CVA with dysphagia. She was admitted for stroke-like symptoms but MRI was negative for acute process. She also has Sjrogren's disease which complicates her dysphagia. Patient has a long history of dysphagia with noted CP bar that she reports she has had surgery for, botox injections,  esophageal dilation, as well as dysphagia tx with SLP. She has had multiple MBS studies at multiple hospital locations including this location. Last MBS here was in 2015 and today's study did reveal worsened dysphagia. She has a history of frequent PNA but reports no PNA in the past year since she has undergone the surgery and dilation. Patient has lost 30 lbs in 1 year. Patient is at a high risk of aspiration, as well as malnutrition.  She has expressed that she does not wish to have a PEG tube placed. Patient will benefit from skilled intervention to address the above impairments. Patients rehabilitation potential is considered to be Guarded     PLAN :  Recommendations and Planned Interventions:  Full liquid diet  Small bites/sips  Sit upright during and at least 60 min after PO  Crush meds  Very slow rate  Frequent small meals  OP SLP tx to include NMES  Frequency/Duration: Patient will be followed by speech-language pathology 3 times a week to address goals. Discharge Recommendations: Outpatient     SUBJECTIVE:   Patient stated I have had pneumonia so many times. OBJECTIVE:     Past Medical History:   Diagnosis Date    Arthritis     Arthropathy     Chronic pain     lupus    Depression     Essential hypertension     Fracture     back    GERD (gastroesophageal reflux disease)     High cholesterol     Ill-defined condition 04/2017    pneumonia    [facility]     Osteoporosis     Pneumonia     Refusal of blood transfusions as patient is Mandaeism     Rheumatoid arthritis, unspecified ([facility]     Sjoegren syndrome     Stroke ([facility] 1998    no residual weakness     Past Surgical History:   Procedure Laterality Date    HX BILATERAL SALPINGO-OOPHORECTOMY  2013    Dr. Tong Beck HX ENDOSCOPY      HX HEENT  10/2018    throat surgery    HX KYPHOPLASTY  02/2016    Pt states T12 and L1     Prior Level of Function/Home Situation:   Home Situation  Home Environment: Private residence  # Steps to Enter: 0  Rails to Enter: No  One/Two Story Residence: One story  Living Alone: Yes  Support Systems: Family member(s)(family checks in daily)  Patient Expects to be Discharged to[de-identified] Apartment(one level condo)  Current DME Used/Available at Home: Grab bars, Shower chair, Cane, straight  Tub or Shower Type: Shower  Diet prior to admission: soft/thin  Current Diet:  NPO  Radiologist: [place]  Film Views: Lateral  Patient Position: upright    Trial 1: Trial 2:   Consistency Presented:  Thin liquid Consistency Presented: Nectar thick liquid;Honey thick liquid   How Presented: Self-fed/presented;Cup/sip How Presented: Self-fed/presented;Cup/sip         Bolus Acceptance: No impairment Bolus Acceptance: No impairment   Bolus Formation/Control: Impaired: Delayed Bolus Formation/Control: Impaired: Delayed   Propulsion: Other (comment); Delayed (# of seconds); Discoordination(bolus rocking anterior-posterior) Propulsion: Delayed (# of seconds); Other (comment); Discoordination(bolus rocking anterior-posterior)   Oral Residue: None Oral Residue: Other (comment)(diffuse)   Initiation of Swallow: Triggered at pyriform sinus(es) Initiation of Swallow: Triggered at valleculae   Timing: No impairment Timing: No impairment   Penetration: During swallow;Flash/transient;From residual Penetration: None   Aspiration/Timing: No evidence of aspiration Aspiration/Timing: No evidence of aspiration   Pharyngeal Clearance: Greater than 50%;10-50%; Pyriform residue (residue increased as study continued) Pharyngeal Clearance: Greater than 50%   Attempted Modifications: Double swallow Attempted Modifications: Double swallow;Effortful swallow;Small sips and bites   Effective Modifications: None Effective Modifications: None                 Trial 3: Trial 4:   Consistency Presented: Puree     How Presented: Self-fed/presented; Other (comment)(1/2 tsp boluses)           Bolus Acceptance: No impairment     Bolus Formation/Control: Impaired: Delayed  :     Propulsion: Delayed (# of seconds); Discoordination     Oral Residue: None     Initiation of Swallow: Triggered at base of tongue    Timing: No impairment     Penetration: From residual     Aspiration/Timing: No evidence of aspiration     Pharyngeal Clearance: Greater than 50%     Attempted Modifications: Right head tilt; Left head tilt;Effortful swallow;Double swallow;Left head turn;Right head turn;Small sips and bites; Alternate liquids/solids     Effective Modifications: None Decreased Tongue Base Retraction?: Yes  Laryngeal Elevation: Incomplete laryngeal closure; Inadequate epiglottic inversion;Minimal movement of larynx/epiglottis; Reduced excursion with laryngeal vestibule gap  Aspiration/Penetration Score: 2 (Penetration/No residue-Contrast enters the airway penetrates, remains above the folds/cords, and is cleared)  Pharyngeal Symmetry: Not assessed  Pharyngeal-Esophageal Segment: Decreased relaxation of upper esophageal segment  Pharyngeal Dysfunction: Crico-pharyngeal dysfunction;Decreased tongue base retraction;Decreased strength;Decreased elevation/closure;Decreased pharyngeal wall constriction    Oral Phase Severity: Moderate  Pharyngeal Phase Severity: Severe  NOMS:   The NOMS functional outcome measure was used to quantify this patient's level of swallowing impairment. Based on the NOMS, the patient was determined to be at level 3 for swallow function         NOMS Swallowing Levels:  Level 1 (CN): NPO  Level 2 (CM): NPO but takes consistency in therapy  Level 3 (CL): Takes less than 50% of nutrition p.o. and continues with nonoral feedings; and/or safe with mod cues; and/or max diet restriction  Level 4 (CK): Safe swallow but needs mod cues; and/or mod diet restriction; and/or still requires some nonoral feeding/supplements  Level 5 (CJ): Safe swallow with min diet restriction; and/or needs min cues  Level 6 (CI): Independent with p.o.; rare cues; usually self cues; may need to avoid some foods or needs extra time  Level 7 ([address] [place] [address]): Independent for all p.o.  BRIELLE. (2003). National Outcomes Measurement System (NOMS): Adult Speech-Language Pathology User's Guide. COMMUNICATION/EDUCATION:   Patient was educated regarding Her deficit(s) of dysphagia as this relates to Her diagnosis of history of CVA. She demonstrated Fairly Good understanding as evidenced by verbalization.     The patients plan of care including findings from [facility], recommendations, planned interventions, and recommended diet changes were discussed with: Registered nurse. Patient/family have participated as able in goal setting and plan of care. Patient/family agree to work toward stated goals and plan of care.     Thank you for this referral.  Lavon Shi SLP  Time Calculation: 20 mins

## 2021-05-14 NOTE — PROGRESS NOTES
Daughter Epi Aldrich called to check on patient. Requested that her phone number be put into the chart.  Phone number is [phone]

## 2021-05-14 NOTE — CONSULTS
NEUROLOGY NOTE     Chief Complaint   Patient presents with    Aphasia     Expressive aphasia       Reason for Consult  I have been asked to see the patient in neurological consultation by Danielle Rodriguez MD to render advice and opinion regarding possible stroke    HPI  The patient is an 42-year-old woman who has had history of stroke 20 years ago and has chronic dysphagia and presents with acute onset dysarthria that started at 56 on 5/13/2021 while she was talking to her daughter. She also had difficulty getting the words out. This lasted for 10 to 15 minutes and then resolved. Patient is already on Plavix for prior stroke. She did have MRI of the brain which was normal.  CT angiogram of the head was normal as well. Transthoracic echo shows ejection fraction 50 to 55%.   Patient's LDL is 84.2 and HbA1c is 5.6    Neurology has been consulted for further management    ROS  A ten system review of constitutional, cardiovascular, respiratory, musculoskeletal, endocrine, skin, SHEENT, genitourinary, psychiatric and neurologic systems was obtained and is unremarkable except as stated in HPI     PMH  Past Medical History:   Diagnosis Date    Arthritis     Arthropathy     Chronic pain     lupus    Depression     Essential hypertension     Fracture     back    GERD (gastroesophageal reflux disease)     High cholesterol     Ill-defined condition 04/2017    pneumonia    Lupus ([address]     Osteoporosis     Pneumonia     Refusal of blood transfusions as patient is [employer]     Rheumatoid arthritis, unspecified ([address]     Sjoegren syndrome     Stroke ([address] 1998    no residual weakness       FH  Family History   Problem Relation Age of Onset    Other Mother         NPH    Stroke Mother     Osteoporosis Mother     Stroke Father     Bipolar Disorder Father     Heart Disease Father     Bipolar Disorder [facility]  Social History     Socioeconomic History    Marital status:  Spouse name: Not on file    Number of children: Not on file    Years of education: Not on file    Highest education level: Not on file   Tobacco Use    Smoking status: Never Smoker    Smokeless tobacco: Never Used   Substance and Sexual Activity    Alcohol use: Yes     Alcohol/week: 0.8 standard drinks     Types: 1 Glasses of wine per week     Comment: seldomly    Drug use: No       ALLERGIES  Allergies   Allergen Reactions    Imuran [Azathioprine] Other (comments)     Lupus attack    Iodinated Contrast Media Hives    Gadavist [Gadobutrol] Hives and Itching     Scant hives to back post receiving injection of gadavist. Denied any breathing problems or throat swelling. Resolved within 30 minutes. MRI contrast Gadolinnium)       PHYSICAL EXAMINATION:   Patient Vitals for the past 24 hrs:   Temp Pulse Resp BP SpO2   05/14/21 1220 97.6 °F (36.4 °C) 60 18 (!) 154/80 99 %   05/14/21 1100 97.5 °F (36.4 °C) 60 15 (!) 179/83 97 %   05/14/21 1019 -- -- -- (!) 150/81 --   05/14/21 0800 -- (!) 57 -- -- --   05/14/21 0749 97.6 °F (36.4 °C) 60 18 (!) 150/81 100 %   05/14/21 0407 97.4 °F (36.3 °C) 60 18 118/68 99 %   05/14/21 0350 98.3 °F (36.8 °C) 64 18 133/74 97 %   05/13/21 2345 97.6 °F (36.4 °C) 60 18 119/66 98 %   05/13/21 1926 97.3 °F (36.3 °C) 66 20 (!) 139/93 99 %   05/13/21 1753 98.6 °F (37 °C) 61 20 (!) 164/73 98 %   05/13/21 1600 -- 67 22 (!) 153/77 97 %   05/13/21 1530 -- 64 22 (!) 162/76 98 %   05/13/21 1500 -- 64 20 (!) 150/72 100 %   05/13/21 1430 -- 62 16 (!) 177/71 100 %   05/13/21 1300 -- (!) 57 17 (!) 185/73 100 %        General:   General appearance: Pt is in no acute distress   Distal pulses are preserved  Fundoscopic exam: attempted    Neurological Examination:  Mental Status:  AAO x3. Speech is fluent. Follows commands, has normal fund of knowledge, attention, short term recall, comprehension and insight. Cranial Nerves: Visual fields are full. PERRL, Extraocular movements are full.  Facial sensation intact. Facial movement intact. Hearing intact to conversation. Palate elevates symmetrically. Shoulder shrug symmetric. Tongue midline. Motor: Strength is 5/5 in all 4 ext. Normal tone. No atrophy. Sensation: Light touch - Normal    Reflexes: DTRs 2+ throughout. Plantar responses downgoing. Coordination/Cerebellar: Intact to finger-nose-finger     Skin: No significant bruising or lacerations.     LAB DATA REVIEWED:    Recent Results (from the past 24 hour(s))   URINALYSIS W/ REFLEX CULTURE    Collection Time: 05/13/21  2:55 PM    Specimen: Urine   Result Value Ref Range    Color YELLOW/STRAW      Appearance CLEAR CLEAR      Specific gravity 1.028 1.003 - 1.030      pH (UA) 6.5 5.0 - 8.0      Protein Negative NEG mg/dL    Glucose Negative NEG mg/dL    Ketone Negative NEG mg/dL    Bilirubin Negative NEG      Blood Negative NEG      Urobilinogen 1.0 0.2 - 1.0 EU/dL    Nitrites Negative NEG      Leukocyte Esterase Negative NEG      WBC 0-4 0 - 4 /hpf    RBC 0-5 0 - 5 /hpf    Epithelial cells FEW FEW /lpf    Bacteria 2+ (A) NEG /hpf    UA:UC IF INDICATED CULTURE NOT INDICATED BY UA RESULT CNI      Hyaline cast 0-2 0 - 5 /lpf   GLUCOSE, POC    Collection Time: 05/13/21  9:05 PM   Result Value Ref Range    Glucose (POC) 162 (H) 65 - 117 mg/dL    Performed by Juliette Overton RN    LIPID PANEL    Collection Time: 05/14/21  2:50 AM   Result Value Ref Range    Cholesterol, total 159 <200 MG/DL    Triglyceride 54 <150 MG/DL    HDL Cholesterol 64 MG/DL    LDL, calculated 84.2 0 - 100 MG/DL    VLDL, calculated 10.8 MG/DL    CHOL/HDL Ratio 2.5 0.0 - 5.0     HEMOGLOBIN A1C WITH EAG    Collection Time: 05/14/21  2:50 AM   Result Value Ref Range    Hemoglobin A1c 5.6 4.0 - 5.6 %    Est. average glucose 114 mg/dL   CBC W/O DIFF    Collection Time: 05/14/21  2:50 AM   Result Value Ref Range    WBC 5.3 3.6 - 11.0 K/uL    RBC 3.75 (L) 3.80 - 5.20 M/uL    HGB 9.3 (L) 11.5 - 16.0 g/dL    HCT 28.9 (L) 35.0 - 47.0 % MCV 77.1 (L) 80.0 - 99.0 FL    MCH 24.8 (L) 26.0 - 34.0 PG    MCHC 32.2 30.0 - 36.5 g/dL    RDW 15.9 (H) 11.5 - 14.5 %    PLATELET 773 823 - 065 K/uL    MPV 9.8 8.9 - 12.9 FL    NRBC 0.0 0  WBC    ABSOLUTE NRBC 0.00 0.00 - 0.01 K/uL   IRON PROFILE    Collection Time: 05/14/21  2:50 AM   Result Value Ref Range    Iron 33 (L) 35 - 150 ug/dL    TIBC 344 250 - 450 ug/dL    Iron % saturation 10 (L) 20 - 50 %   FERRITIN    Collection Time: 05/14/21  2:50 AM   Result Value Ref Range    Ferritin 12 8 - 252 NG/ML   VITAMIN B12    Collection Time: 05/14/21  2:50 AM   Result Value Ref Range    Vitamin B12 304 193 - 986 pg/mL   GLUCOSE, POC    Collection Time: 05/14/21  6:51 AM   Result Value Ref Range    Glucose (POC) 119 (H) 65 - 117 mg/dL    Performed by Sri Betancourt RN    ECHO ADULT COMPLETE    Collection Time: 05/14/21 11:11 AM   Result Value Ref Range    IVSd 0.89 0.60 - 0.90 cm    IVSs 1.03 cm    LVIDd 4.38 3.90 - 5.30 cm    LVIDs 3.05 cm    LVOT d 2.14 cm    LVPWd 0.94 (A) 0.60 - 0.90 cm    LVPWs 1.32 cm    LV Ejection Fraction MOD 4C 42 percent    LV ED Vol A4C 62.42 mL    LV ES Vol A4C 36.38 mL    LVOT Cardiac Output 4.34 liter/minute    LVOT Peak Gradient 2.79 mmHg    Left Ventricular Outflow Tract Mean Gradient 1.60 mmHg    LVOT SV 75.6 mL    LVOT Peak Velocity 83.47 cm/s    LVOT VTI 21.12 cm    RVIDd 3.86 cm    RVSP 37.23 mmHg    LA Area 4C 19.15 cm2    LA Vol 4C 58.43 (A) 22.00 - 52.00 mL    Left Atrium to Aortic Root Ratio 0.92     Est. RA Pressure 10.00 mmHg    Aortic Valve Area by Continuity of Peak Velocity 2.36 cm2    Aortic Valve Area by Continuity of VTI 2.33 cm2    AV R .21 mmHg    Aortic Regurgitant Pressure Half-time 537.70 ms    AR Max Andres 524.90 cm/s    AoV PG 6.42 mmHg    Aortic Valve Systolic Mean Gradient 1.11 mmHg    Aortic Valve Systolic Peak Velocity 140.87 cm/s    AoV VTI 32.46 cm    MV A Andres 100.31 cm/s    Mitral Valve E Wave Deceleration Time 242.00 ms    MV E Andres 90.46 cm/s E/E' ratio (averaged) 14.39     E/E' lateral 10.72     E/E' septal 18.06     LV E' Lateral Velocity 8.44 cm/s    LV E' Septal Velocity 5.01 cm/s    TR Max Velocity 425.56 cm/s    Pulmonic Valve Systolic Peak Instantaneous Gradient 1.82 mmHg    Pulmonic Regurgitant End Max Velocity 67.54 cm/s    Tricuspid Valve A Wave Peak Velocity 34.63 cm/s    Tricuspid Valve E Wave Peak Velocity 58.34 cm/s    Triscuspid Valve Regurgitation Peak Gradient 27.23 mmHg    TR Max Velocity 260.92 cm/s    MV E/A 0.90     LV Mass .9 67.0 - 162.0 g    LV Mass AL Index 83.9 43.0 - 95.0 g/m2    LA Vol Index 37.72 16.00 - 28.00 ml/m2    LVED Vol Index A4C 40.3 mL/m2    LVES Vol Index A4C 23.5 mL/m2    NANCY/BSA Pk Andres 1.5 cm2/m2    NANCY/BSA VTI 1.5 cm2/m2   GLUCOSE, POC    Collection Time: 05/14/21 11:43 AM   Result Value Ref Range    Glucose (POC) 89 65 - 117 mg/dL    Performed by Thea Bo RN         HOME MEDS  Prior to Admission Medications   Prescriptions Last Dose Informant Patient Reported? Taking?   acetaminophen (TYLENOL) 500 mg tablet  at . .. Self Yes Yes   Sig: Take 500 mg by mouth every six (6) hours as needed for Pain. atorvastatin (LIPITOR) 40 mg tablet 5/12/2021 at Unknown time Self Yes Yes   Sig: Take 20 mg by mouth daily (after lunch). baclofen (LIORESAL) 10 mg tablet  Self Yes Yes   Sig: Take  by mouth as needed. cetirizine (ZYRTEC) 5 mg tablet 5/12/2021 at Unknown time Self Yes Yes   Sig: Take 5 mg by mouth daily. citalopram (CELEXA) 20 mg tablet 5/12/2021 at Unknown time Self Yes Yes   Sig: Take 20 mg by mouth daily (after lunch). clopidogrel (PLAVIX) 75 mg tablet  Self Yes No   Sig: Take 75 mg by mouth daily (after lunch). diclofenac (VOLTAREN) 1 % topical gel  Self Yes Yes   Sig: Apply 4 g to affected area as needed. To Knee and left thumb   esomeprazole (NEXIUM) 40 mg capsule  Self Yes Yes   Sig: Take 40 mg by mouth daily (with lunch).    peg 400-propylene glycol (Systane, propylene glycoL,) 0.4-0.3 % drop 5/12/2021 Self Yes Yes   Sig: Administer 1 Drop to both eyes two (2) times a day. spironolactone-hydrochlorothiazide (ALDACTAZIDE) 25-25 mg per tablet 5/12/2021 at 1200 Self Yes Yes   Sig: Take 0.5 Tabs by mouth daily (after lunch). vitamins  A,C,E-zinc-copper (Ocuvite PreserVision) 7,160 unit- 113 mg-100 unit tab tablet 5/12/2021 at Unknown time Self Yes Yes   Sig: Take 2 Tabs by mouth daily. Facility-Administered Medications: None       CURRENT MEDS  Current Facility-Administered Medications   Medication Dose Route Frequency    0.9% sodium chloride infusion  75 mL/hr IntraVENous CONTINUOUS    clopidogreL (PLAVIX) tablet 75 mg  75 mg Oral DAILY    aspirin delayed-release tablet 81 mg  81 mg Oral DAILY    atorvastatin (LIPITOR) tablet 20 mg  20 mg Oral QHS    cetirizine (ZYRTEC) tablet 5 mg  5 mg Oral DAILY    citalopram (CELEXA) tablet 20 mg  20 mg Oral PCL    pantoprazole (PROTONIX) tablet 40 mg  40 mg Oral ACB    vitamins  A,C,E-zinc-copper (I-ELEONORA PROTECT) tablet 2 Tab  2 Tab Oral DAILY    LORazepam (ATIVAN) injection 0.5 mg  0.5 mg IntraVENous ON CALL       Stroke workup    MRI Brain  Normal    CT angiogram head and neck  Normal    TTE:   Ejection fraction 50 to 55%    Stroke labs:  HgBA1c    Lab Results   Component Value Date/Time    Hemoglobin A1c 5.6 05/14/2021 02:50 AM     LDL   Lab Results   Component Value Date/Time    LDL, calculated 84.2 05/14/2021 02:50 AM       IMPRESSION:  Janneth Lawson is a 80 y.o. female who presents with expressive aphasia and dysarthria lasting for 15 minutes on 5/13/2021. Patient did have a TIA. Stroke work-up is negative. Patient takes Plavix 75 mg daily. Will add aspirin 81 mg a day for the next 30 days. The patient also takes Lipitor 20 mg daily which I will increase to 40 mg daily. No elective surgery for the next 6 to 9 months.     RECOMMENDATIONS:  - Telemetry  - BP goal is less than 140/90  - Start aspirin 81 mg daily and continue Plavix 75 mg daily  - Increase atorvastatin to 40 mg daily  - ST/OT/PT eval    No further work-up. Patient already has an appointment with Dr. Frances Zimmerman in August.  Continue to follow-up with him. Thank you very much for this consultation.       Alena Causey MD  Neurologist

## 2021-05-14 NOTE — PROGRESS NOTES
PHYSICAL THERAPY EVALUATION WITH DISCHARGE  Patient: Talon Lawson (80 y.o. female)  Date: 5/14/2021  Primary Diagnosis: Dysarthria [R47.1]  Stroke (cerebrum) ([facility]) [I63.9]       Precautions:          ASSESSMENT  Based on the objective data described below, the patient presents with baseline strength, balance, and insight. Oriented x 4. Pt mobilizing at an overall supervision level within room and hallway. Ambulated 135ft with steady gait speed and no LOB or path deviations. Displays full ROM in all extremities. Pt lives alone and family checks-in daily. Pt is at her baseline and has no additional acute therapy needs at this time. Dysarthria has resolved at time of evaluation. Functional Outcome Measure: The patient scored Total: 51/56 on the Baires Balance Assessment which is indicative of low fall risk. Other factors to consider for discharge: good family support, pt lives alone     Further skilled acute physical therapy is not indicated at this time. PLAN :  Recommendation for discharge: (in order for the patient to meet his/her long term goals)  No skilled physical therapy/ follow up rehabilitation needs identified at this time. This discharge recommendation:  Has been made in collaboration with the attending provider and/or case management    IF patient discharges home will need the following DME: none         SUBJECTIVE:   Patient stated I don't eat a lot of meat but I will have chaves every once in a while.     OBJECTIVE DATA SUMMARY:   HISTORY:    Past Medical History:   Diagnosis Date    Arthritis     Arthropathy     Chronic pain     lupus    Depression     Essential hypertension     Fracture     back    GERD (gastroesophageal reflux disease)     High cholesterol     Ill-defined condition 04/2017    pneumonia    [facility]     Osteoporosis     Pneumonia     Refusal of blood transfusions as patient is [place]     Rheumatoid arthritis, unspecified ([facility]     Sjoegren syndrome     Stroke [facility]) 1998    no residual weakness     Past Surgical History:   Procedure Laterality Date    HX BILATERAL SALPINGO-OOPHORECTOMY  2013    Dr. Jarocho Bowman HX HEENT  10/2018    throat surgery    HX KYPHOPLASTY  02/2016    Pt states T12 and L1       Prior level of function: Independent, occasionally uses SPC, pt does not drive  Personal factors and/or comorbidities impacting plan of care: arthritis, macular degeneration, CVA    Home Situation  Home Environment: Private residence  # Steps to Enter: 0  Rails to Enter: No  One/Two Story Residence: One story  Living Alone: Yes  Support Systems: Family member(s)(family checks in daily)  Patient Expects to be Discharged to[de-identified] Apartment(one level condo)  Current DME Used/Available at Home: Grab bars, Shower chair, Cane, straight  Tub or Shower Type: Shower    EXAMINATION/PRESENTATION/DECISION MAKING:   Critical Behavior:  Neurologic State: Alert  Orientation Level: Oriented X4  Cognition: Follows commands     Hearing: Auditory  Auditory Impairment: Hard of hearing, bilateral  Skin:    Edema:   Range Of Motion:  AROM: Within functional limits           PROM: Within functional limits           Strength:    Strength:  Within functional limits                    Tone & Sensation:   Tone: Normal              Sensation: Intact               Coordination:     Vision:      Functional Mobility:  Bed Mobility:  Rolling: Supervision  Supine to Sit: Supervision     Scooting: Supervision  Transfers:  Sit to Stand: Supervision  Stand to Sit: Supervision        Bed to Chair: Supervision              Balance:   Sitting: Intact  Standing: Intact  Standing - Static: Good  Standing - Dynamic : Good  Ambulation/Gait Training:  Distance (ft): 135 Feet (ft)  Assistive Device: Gait belt  Ambulation - Level of Assistance: Stand-by assistance        Gait Abnormalities: Decreased step clearance        Base of Support: Narrowed     Speed/Dena: Slow Stairs: Therapeutic Exercises:       Functional Measure  Baires Balance Test:    Sitting to Standing: 3  Standing Unsupported: 4  Sitting with Back Unsupported: 4  Standing to Sittin  Transfers: 4  Standing Unsupported with Eyes Closed: 4  Standing Unsupported with Feet Together: 4  Reach Forward with Outstretched Arm: 4   Object: 4  Turn to Look Over Shoulders: 4  Turn 360 Degrees: 4  Alternate Foot on Step/Stool: 3  Standing Unsupported One Foot in Front: 3  Stand on One Le  Total: 51/56         56=Maximum possible score;   0-20=High fall risk  21-40=Moderate fall risk   41-56=Low fall risk        Physical Therapy Evaluation Charge Determination   History Examination Presentation Decision-Making   MEDIUM  Complexity : 1-2 comorbidities / personal factors will impact the outcome/ POC  LOW Complexity : 1-2 Standardized tests and measures addressing body structure, function, activity limitation and / or participation in recreation  LOW Complexity : Stable, uncomplicated  Other outcome measures Baires  LOW       Based on the above components, the patient evaluation is determined to be of the following complexity level: LOW     Pain Rating:  none    Activity Tolerance:   Good    After treatment patient left in no apparent distress:   Supine in bed, Call bell within reach, Bed / chair alarm activated and Side rails x 3    COMMUNICATION/EDUCATION:   The patients plan of care was discussed with: Registered nurse. Patient was educated regarding Her deficit(s) of dysarthria as this relates to Her diagnosis of TIA. She demonstrated Excellent understanding as evidenced by recall. Patient and/or family was verbally educated on the BE FAST acronym for signs/symptoms of CVA and TIA. BE FAST was written on patient's communication board  for visual education and reinforcement. All questions answered with patient indicating good understanding.      Fall prevention education was provided and the patient/caregiver indicated understanding., Patient/family have participated as able in goal setting and plan of care. and Patient/family agree to work toward stated goals and plan of care.     Thank you for this referral.  Socrates Brandt, PT   Time Calculation: 27 mins

## 2021-05-14 NOTE — PROGRESS NOTES
SPEECH LANGUAGE PATHOLOGY DYSPHAGIA TREATMENT  Patient: Lavelle Garcia (80 y.o. female)  Date: 5/14/2021  Diagnosis: Dysarthria [R47.1]  Stroke (cerebrum) ([facility]) [I63.9] <principal problem not specified>      Precautions: Aspiration      ASSESSMENT:  Patient participated with MBS earlier this afternoon. SLP briefly educated patient regarding results after exam. SLP returned to room after study and discussed results and recommendations with both patient and daughter. Discussed severity of her dysphagia, risk of aspiration, and risk of malnutrition. Patient reports that she does not wish to have a PEG tube. She has had therapy in the past but reports that she has not tried NMES yet. Discussed OP therapy with NMES. Educated patient on aspiration risk and strategies. Patient reports understanding and using them, however, patient's daughter reports that she is not always compliant. PLAN:  Recommendations and Planned Interventions:  See MBS results for detailed recommendations  OP SLP tx with NMES to address pharyngeal dysfunction  Patient continues to benefit from skilled intervention to address the above impairments. Continue treatment per established plan of care. Discharge Recommendations:  Outpatient     SUBJECTIVE:   Patient stated I was on a liquid diet for a year.     OBJECTIVE:   Cognitive and Communication Status:  Neurologic State: Alert  Orientation Level: Oriented X4  Cognition: Follows commands     Perseveration: No perseveration noted                                                                                                                                       Pain:  Pain Scale 1: Numeric (0 - 10)  Pain Intensity 1: 0       After treatment:   Patient left in no apparent distress sitting up in chair, Call bell within reach, Nursing notified, Caregiver / family present and Bed / chair alarm activated    COMMUNICATION/EDUCATION:   Patient was educated regarding her deficit(s) of dysphagia as this relates to her diagnosis of hx of cva. She demonstrated fairly Good understanding as evidenced by verbalization. The patient's plan of care including recommendations, planned interventions, and recommended diet changes were discussed with: Registered nurse.      AZ Saeed  Time Calculation: 20 mins

## 2021-05-14 NOTE — PROGRESS NOTES
End of Shift Note    Bedside shift change report given to OCTAVIO RN (oncoming nurse) by Eulis Denver, RN (offgoing nurse).   Report included the following information SBAR    Shift worked:  DAYS   Shift summary and any significant changes:     -MRI COMPLETED   -BARIUM COMPLETED   -PATIENT ON FULL LIQUID DIET       Concerns for physician to address:  NONE   Zone phone for oncoming shift:   [phone]     Patient Information  Caro Pospisil  80 y.o.  5/13/2021 11:24 AM by Hector Thompson MD. aWyne Potter was admitted from Home    Problem List  Patient Active Problem List    Diagnosis Date Noted    Stroke (cerebrum) ([address] 05/13/2021    Dysarthria 05/13/2021    Combined forms of age-related cataract of left eye 05/13/2021    History of stroke 08/27/2019    Cerebral microvascular disease 06/02/2016    Localization-related partial epilepsy with complex partial seizures ([address] 06/02/2016    Stenosis of both carotid arteries without cerebral infarction 06/02/2016    Abnormal involuntary movement, right arm 06/02/2016    Transient ischemic attack involving left internal carotid artery 06/02/2016    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.2) 04/09/2015    Dysphagia 03/24/2015    Myopathy 03/24/2015    Osteoporosis 03/24/2015    Type II or unspecified type diabetes mellitus with neurological manifestations, not stated as uncontrolled(250.60) ([address] 03/24/2015    Hypothyroid 03/24/2015    Dysarthria due to recent stroke 03/24/2015    Dysarthria 03/24/2015     Past Medical History:   Diagnosis Date    Arthritis     Arthropathy     Chronic pain     lupus    Depression     Essential hypertension     Fracture     back    GERD (gastroesophageal reflux disease)     High cholesterol     Ill-defined condition 04/2017    pneumonia    Lupus ([address]     Osteoporosis     Pneumonia     Refusal of blood transfusions as patient is [employer] orthodox     Rheumatoid arthritis, unspecified ([address]     Sjoegren syndrome     St[facility]    no residual weakness       Core Measures:  CVA: Yes Yes  CHF:No No    Activity:  Activity Level: Up with Assistance  Number times ambulated in hallways past shift: 0  Number of times OOB to chair past shift: 1    Cardiac:   Cardiac Monitoring: Yes      Cardiac Rhythm: Sinus Rhythm    Access:   Current line(s): PIV     Genitourinary:   Urinary status: voiding    Respiratory:   O2 Device: None (Room air)  Chronic home O2 use?: N/A  Incentive spirometer at bedside: N/A       GI:  Last Bowel Movement Date: 05/13/21  Current diet:  DIET FULL LIQUID  Passing flatus: NO  Tolerating current diet: YES       Pain Management:   Patient states pain is manageable on current regimen: YES    Skin:  Gil Score: 18  Interventions: turn team and PT/OT consult    Patient Safety:  Fall Score:  Total Score: 3  Interventions: bed/chair alarm, gripper socks, pt to call before getting OOB and stay with me (per policy)  High Fall Risk: Yes  @Rollbelt  @dexterity to release roll belt  Yes/No ( must document dexterity  here by stating Yes or No here, otherwise this is a restraint and must follow restraint documentation policy.)    DVT prophylaxis:  DVT prophylaxis Med- Yes    Active Consults:  IP CONSULT TO HOSPITALIST  IP CONSULT TO NEUROLOGY    Length of Stay:  Expected LOS: 2d 0h  Actual LOS: 1  Discharge Plan: Yes SNF      Kwabena Dickinson RN

## 2021-05-14 NOTE — PROGRESS NOTES
Problem: Dysphagia (Adult)  Goal: *Acute Goals and Plan of Care (Insert Text)  Description: Speech Pathology Goals   Initiated 5/14/21  1. Patient will participate in MBS to further assess swallow functioning and safety  Outcome: Progressing Towards Goal   SPEECH [address] EVALUATION  Patient: Greta Flores (80 y.o. female)  Date: 5/14/2021  Primary Diagnosis: Dysarthria [R47.1]  Stroke (cerebrum) ([facility]) [I63.9]        Precautions: Aspiration       ASSESSMENT :  Based on the objective data described below, the patient presents with at least moderate oropharyngeal dysphagia, as characterized by decreased mastication, delayed propulsion/swallow initiation, decreased laryngeal excursion, multiple swallows per small bolus, change in vocal quality, cough/throat clear, and delayed cough/throat clear. Patient has a history of dysphagia. Most recent MBS that was performed here visualized CP bar, significant pharyngeal residue, no aspiration, and recommended mostly pureed foods, thin liquids, with alternating liquids/solids and multiple swallows per bolus. Patient gave some conflcting history but did report at least 2 MBS studies since then and that she has been eating a soft solid diet/thin liquids since \"they cut it\" pointing to her throat. She was unable to provide further information to clarify. She also reported she had \"my throat stretched a year after they cut it. \" Patient reports that her swallow performance today is consistent with her baseline but she presents at a high risk of aspiration with all consistencies. Recommend MBS to further assess swallow functioning and safety. Daughter came in at end of evaluation and agreed to study today, but asked that patient be given \"nutrition through her nose\" in the meantime. Addendum: some time after bedside assessment, daughter told RN that she does not want MBS performed.  Spoke with RN and MD. MD will speak with daughter and advise SLP as to the plan.    Patient will benefit from skilled intervention to address the above impairments. Patients rehabilitation potential is considered to be Guarded     PLAN :  Recommendations and Planned Interventions:  NPO except meds   MBS  Frequency/Duration: Patient will be followed by speech-language pathology 4 times a week to address goals. Discharge Recommendations: To Be Determined     SUBJECTIVE:   Patient stated oh yes when asked if she coughs when she eats    OBJECTIVE:     Past Medical History:   Diagnosis Date    Arthritis     Arthropathy     Chronic pain     lupus    Depression     Essential hypertension     Fracture     back    GERD (gastroesophageal reflux disease)     High cholesterol     Ill-defined condition 04/2017    pneumonia    Lupus ([facility] [address]     Osteoporosis     Pneumonia     Refusal of blood transfusions as patient is Jewish     Rheumatoid arthritis, unspecified ([facility] [address]     Sjoegren syndrome     Stroke ([facility] [address] 1998    no residual weakness     Past Surgical History:   Procedure Laterality Date    HX BILATERAL SALPINGO-OOPHORECTOMY  2013    Dr. Sergo Lau ENDOSCOPY      HX HEENT  10/2018    throat surgery    HX KYPHOPLASTY  02/2016    Pt states T12 and L1     Prior Level of Function/Home Situation:      Diet prior to admission: appears to be soft or mechanical soft/thin liquids per pt report  Current Diet:  NPO  Cognitive and Communication Status:  Neurologic State: Alert  Orientation Level: Oriented to situation, Disoriented to place, Oriented to person  Cognition: Follows commands     Perseveration: No perseveration noted     Oral Assessment:  Oral Assessment  Labial: No impairment  Dentition: Upper dentures; Lower dentures  Oral Hygiene: moist oral mucosa  Lingual: No impairment  P.O. Trials:  Patient Position: upright in bed  Vocal quality prior to P.O.: No impairment  Consistency Presented: Thin liquid;Puree; Solid  How Presented: Self-fed/presented;Cup/sip;Spoon     Bolus Acceptance: No impairment  Bolus Formation/Control: Impaired  Type of Impairment: Delayed;Mastication  Propulsion: Delayed (# of seconds)  Oral Residue: None  Initiation of Swallow: Delayed (# of seconds)  Laryngeal Elevation: Decreased  Aspiration Signs/Symptoms: Weak cough; Change vocal quality;Delayed cough/throat clear  Pharyngeal Phase Characteristics: Suspected pharyngeal residue;Multiple swallows; Altered vocal quality  Effective Modifications: None                  NOMS:   The NOMS functional outcome measure was used to quantify this patient's level of swallowing impairment. Based on the NOMS, the patient was determined to be at level 2 for swallow function       NOMS Swallowing Levels:  Level 1 (CN): NPO  Level 2 (CM): NPO but takes consistency in therapy  Level 3 (CL): Takes less than 50% of nutrition p.o. and continues with nonoral feedings; and/or safe with mod cues; and/or max diet restriction  Level 4 (CK): Safe swallow but needs mod cues; and/or mod diet restriction; and/or still requires some nonoral feeding/supplements  Level 5 (CJ): Safe swallow with min diet restriction; and/or needs min cues  Level 6 (CI): Independent with p.o.; rare cues; usually self cues; may need to avoid some foods or needs extra time  Level 7 ([address] [place] [address]): Independent for all p.o.  BRIELLE. (2003). National Outcomes Measurement System (NOMS): Adult Speech-Language Pathology User's Guide. Pain:  Pain Scale 1: Numeric (0 - 10)  Pain Intensity 1: 0       After treatment:   Patient left in no apparent distress in bed, Call bell within reach, Nursing notified, Caregiver / family present, Bed / chair alarm activated, and Safety precautions posted at beside. COMMUNICATION/EDUCATION:   Patient was educated regarding her deficit(s) of dysphagia as this relates to her diagnosis of CVA. She demonstrated Fair understanding as evidenced by verbalization.     The patient's plan of care including recommendations, planned interventions, and recommended diet changes were discussed with: Registered nurse and Physician. Patient/family have participated as able in goal setting and plan of care. Patient/family agree to work toward stated goals and plan of care.     Thank you for this referral.  Flor Burt, SLP  Time Calculation: 20 mins

## 2021-05-14 NOTE — ROUTINE PROCESS
Attempted to schedule PCP [facility] with Dr. Gus Vogt, left voicemail and currently awaiting return call

## 2021-05-14 NOTE — PROGRESS NOTES
Received notification from bedside RN about patient with regards to: requesting for anxiety medication prior to MRI in AM. Patient is claustrophobic    Intervention given: Ativan 0.5 mg IV on call to MRI ordered

## 2021-05-14 NOTE — PROGRESS NOTES
OCCUPATIONAL THERAPY EVALUATION/DISCHARGE  Patient: Gilmer Herrmann (80 y.o. female)  Date: 5/14/2021  Primary Diagnosis: Dysarthria [R47.1]  Stroke (cerebrum) ([facility] [address] [I63.9]       Precautions:       ASSESSMENT  Based on the objective data described below, the patient presents very close to her functional Mod I baseline with performing basic self-care tasks and related mobility, requiring Supervision for safety at this time. Pt was admitted to [address] [facility] ED vis EMS after her daughter (who is a PT) stopped by and observed Pt was demonstrating expressive aphasia. MRI was negative for acute processes. CT head revealed focal ischemia in R frontal lobe yet no acute processes. Expressive aphasia appears to have resolved as none was observed during this evaluation. Per daughter Pt lives alone and \"does what she wants\" in terms of cooking, IADL and item retrieval from ground level with occasional near-misses. Pts daughter and her  check on pt daily and plan to get the Life Alert program setup for her upon D/C. Pt and her daughter are agreeable to [facility] therapies upon D/C and agree with plan to ensure Pts safety with resuming her daily ADL/IADL routine safely given recent near-misses. She is not in need of continued acute skilled OT- recommend nursing staff provide Supervision with all OOB functional tasks. OT will sign off. Current Level of Function (ADLs/self-care): Up to Supervision (SBA w/ tub transfers)    Functional Outcome Measure: The patient scored 50/100 on the Barthel Index outcome measure which is indicative of requiring up to 50% assist with basic ADL and related mobility. Other factors to consider for discharge: Lives alone, Daughter states family plans to setup Life Alert program for Pt upon D/C. PLAN :  Recommend with staff: Supervision with OOB functional tasks. Active ADL engagement.      Recommendation for discharge: (in order for the patient to meet his/her long term goals)  Occupational therapy at least 2 days/week in the home AND ensure assist and/or supervision for safety with tub transfers and IADL    This discharge recommendation:  Has been made in collaboration with the attending provider and/or case management    IF patient discharges home will need the following DME: patient owns DME required for discharge       SUBJECTIVE:   Patient stated I need someone to gravalize my shower. .. I'm scared of slipping.  (educated Pt on benefits to having family purchase rubber mat w/ suction)    OBJECTIVE DATA SUMMARY:   HISTORY:   Past Medical History:   Diagnosis Date    Arthritis     Arthropathy     Chronic pain     lupus    Depression     Essential hypertension     Fracture     back    GERD (gastroesophageal reflux disease)     High cholesterol     Ill-defined condition 04/2017    pneumonia    Lupus ([facility] [address]     Osteoporosis     Pneumonia     Refusal of blood transfusions as patient is Jewish     Rheumatoid arthritis, unspecified ([facility] [address]     Sjoegren syndrome     Stroke ([facility] [address] 1998    no residual weakness     Past Surgical History:   Procedure Laterality Date    HX BILATERAL SALPINGO-OOPHORECTOMY  2013    Dr. Joyce Maxwell    HX ENDOSCOPY      HX HEENT  10/2018    throat surgery    HX KYPHOPLASTY  02/2016    Pt states T12 and L1       Prior Level of Function/Environment/Context: Lives alone with supportive daughter and son in law nearby who stop by daily and assist with IADL and transportation. Mod I with ADL/IADL using SPC occasionally. Stands to shower. Admits to near-misses yet denies Hx of falls. No longer drives. Pt self-aware of visual deficits and safety risks with driving.      Expanded or extensive additional review of patient history:   Home Situation  Home Environment: Private residence  # Steps to Enter: 0  Rails to Enter: No  One/Two Story Residence: One story  Living Alone: Yes  Support Systems: Family member(s)(family checks in daily)  Patient Expects to be Discharged to[de-identified] Apartment(one level condo)  Current DME Used/Available at Home: Grab bars, Shower chair, Cane, straight  Tub or Shower Type: Shower    Hand dominance: Right    EXAMINATION OF PERFORMANCE DEFICITS:  Cognitive/Behavioral Status:  Neurologic State: Alert  Orientation Level: Oriented X4  Cognition: Follows commands     Perseveration: No perseveration noted       Skin: Intact    Edema: None observed    Hearing: Auditory  Auditory Impairment: Hard of hearing, bilateral    Vision/Perceptual:                           Acuity: Within Defined Limits;Able to read clock/calendar on wall without difficulty; Able to read employee name badge without difficulty    Corrective Lenses: Glasses    Range of Motion:  AROM: Within functional limits  PROM: Within functional limits                      Strength:  Strength: Within functional limits                Coordination:     Fine Motor Skills-Upper: Left Intact; Right Intact    Gross Motor Skills-Upper: Left Intact; Right Intact    Tone & Sensation:  Tone: Normal  Sensation: Intact                      Balance:  Sitting: Intact  Standing: Intact  Standing - Static: Good  Standing - Dynamic : Good    Functional Mobility and Transfers for ADLs:  Bed Mobility:  Rolling: Supervision  Supine to Sit: Supervision  Scooting: Supervision    Transfers:  Sit to Stand: Supervision  Stand to Sit: Supervision  Bed to Chair: Supervision    ADL Assessment:  Feeding: Other (comment)(NPO per SLP)    Oral Facial Hygiene/Grooming: Supervision(standing)    Bathing: Supervision    Upper Body Dressing: Setup    Lower Body Dressing: Supervision    Toileting: Supervision                ADL Intervention and task modifications:       Lower Body Dressing Assistance  Socks: Set-up  Leg Crossed Method Used: Yes  Position Performed: Seated in chair    Functional Measure:  Barthel Index:    Bathin  Bladder: 10  Bowels: 10  Groomin  Dressin  Feedin  Mobility: 5  Stairs: 0  Toilet Use: 5  Transfer (Bed to Chair and Back): 10  Total: 50/100        The Barthel ADL Index: Guidelines  1. The index should be used as a record of what a patient does, not as a record of what a patient could do. 2. The main aim is to establish degree of independence from any help, physical or verbal, however minor and for whatever reason. 3. The need for supervision renders the patient not independent. 4. A patient's performance should be established using the best available evidence. Asking the patient, friends/relatives and nurses are the usual sources, but direct observation and common sense are also important. However direct testing is not needed. 5. Usually the patient's performance over the preceding 24-48 hours is important, but occasionally longer periods will be relevant. 6. Middle categories imply that the patient supplies over 50 per cent of the effort. 7. Use of aids to be independent is allowed. Aurelia Wesley., Barthel, CONSTANTINO. (3421). Functional evaluation: the Barthel Index. [facility] (14)2. Dominic Melton kobe YAYO Hernandez, Jayda Morris., Umberto Zhong., [employer], [address] [place] [address] (1999). Measuring the change indisability after inpatient rehabilitation; comparison of the responsiveness of the Barthel Index and Functional [place] Measure. Journal of Neurology, Neurosurgery, and Psychiatry, 66(4), 632-131. RAMIREZ Elizondo, ALDA Negrete, & Margaret Yap, M.A. (2004.) Assessment of post-stroke quality of life in cost-effectiveness studies: The usefulness of the Barthel Index and the EuroQoL-5D.  Quality of Life Research, 15, 104-24       Occupational Therapy Evaluation Charge Determination   History Examination Decision-Making   LOW Complexity : Brief history review  LOW Complexity : 1-3 performance deficits relating to physical, cognitive , or psychosocial skils that result in activity limitations and / or participation restrictions  LOW Complexity : No comorbidities that affect functional and no verbal or physical assistance needed to complete eval tasks       Based on the above components, the patient evaluation is determined to be of the following complexity level: LOW   Pain Rating:  Denied pain. Activity Tolerance:   Good    After treatment patient left in no apparent distress:    Supine in bed, Call bell within reach, Bed / chair alarm activated and Side rails x 3    COMMUNICATION/EDUCATION:   The patients plan of care was discussed with: Physical therapist, Registered nurse and Patient.      Thank you for this referral.  Rodrigo Holcomb OTR/L  Time Calculation: 25 mins

## 2021-05-14 NOTE — PROGRESS NOTES
Hospitalist Progress Note    NAME: Caro Lawson   :  1935   MRN:  [id number]       Assessment / Plan:  Transient Dysarthria likely d/t TIA  Prior stroke with residual mild left arm weakness, POA    CT head with small vessel ischemic change and chronic ischemia in right frontal lobe. CTA head and neck without any LVO or vascular abnormalities.    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 Mcclain Poster for dysphagia  --taken off plaquenil since it was affecting her vision  --per patient, her rheumatologist considering some type of infusion therapy depending on her vision after cataract surgery     Hyperlipidemia  --continue lipitor     GERD  --continue PPI     Depression  --continue celexa     Code: discussed, wants DNR/DNI  DVT prophylaxis: lovenox  Surrogate decision maker:  Daughter and son    Estimated discharge date: 5/15  Barriers:     Subjective:     Chief Complaint / Reason for Physician Visit  F/u for TIA  She is back to baseline this Am    Review of Systems:  Symptom Y/N Comments  Symptom Y/N Comments   Fever/Chills n   Chest Pain n    Poor Appetite n   Edema n    Cough    Abdominal Pain     Sputum    Joint Pain     SOB/AVERY    Pruritis/Rash     Nausea/vomit    Tolerating PT/OT     Diarrhea    Tolerating Diet     Constipation    Other       Could NOT obtain due to:      Objective:     VITALS:   Last 24hrs VS reviewed since prior progress note. Most recent are:  Patient Vitals for the past 24 hrs:   Temp Pulse Resp BP SpO2   05/14/21 1220 97.6 °F (36.4 °C) 60 18 (!) 154/80 99 %   05/14/21 1200 -- 60 -- -- --   05/14/21 1100 97.5 °F (36.4 °C) 60 15 (!) 179/83 97 %   05/14/21 1019 -- -- -- (!) 150/81 --   05/14/21 0800 -- (!) 57 -- -- --   05/14/21 0749 97.6 °F (36.4 °C) 60 18 (!) 150/81 100 %   05/14/21 0407 97.4 °F (36.3 °C) 60 18 118/68 99 %   05/14/21 0350 98.3 °F (36.8 °C) 64 18 133/74 97 %   05/13/21 2345 97.6 °F (36.4 °C) 60 18 119/66 98 %   05/13/21 1926 97.3 °F (36.3 °C) 66 20 (!) 139/93 99 %   05/13/21 1753 98.6 °F (37 °C) 61 20 (!) 164/73 98 %   05/13/21 1600 -- 67 22 (!) 153/77 97 %   05/13/21 1530 -- 64 22 (!) 162/76 98 %   05/13/21 1500 -- 64 20 (!) 150/72 100 %       Intake/Output Summary (Last 24 hours) at 5/14/2021 1450  Last data filed at 5/13/2021 1455  Gross per 24 hour   Intake --   Output 100 ml   Net -100 ml        I had a face to face encounter and independently examined this patient on 5/14/2021, as outlined below:  PHYSICAL EXAM:  General: WD, WN. Alert, cooperative, no acute distress    EENT:  EOMI. Anicteric sclerae. MMM  Resp:  CTA bilaterally, no wheezing or rales. No accessory muscle use  CV:  Regular  rhythm,  No edema  GI:  Soft, Non distended, Non tender. +Bowel sounds  Neurologic:  Alert and oriented X 3, normal speech,   Psych:   Good insight. Not anxious nor agitated  Skin:  No rashes.   No jaundice    Reviewed most current lab test results and cultures  YES  Reviewed most current radiology test results   YES  Review and summation of old records today    NO  Reviewed patient's current orders and MAR    YES  PMH/SH reviewed - no change compared to H&P  ________________________________________________________________________  Care Plan discussed with:    Comments   Patient y    Family      RN y    Care Manager     Consultant                        Multidiciplinary team rounds were held today with , nursing, pharmacist and clinical coordinator. Patient's plan of care was discussed; medications were reviewed and discharge planning was addressed. ________________________________________________________________________  Total NON critical care TIME: 35   Minutes    Total CRITICAL CARE TIME Spent:   Minutes non procedure based      Comments   >50% of visit spent in counseling and coordination of care     ________________________________________________________________________  Steffen Berg MD     Procedures: see electronic medical records for all procedures/Xrays and details which were not copied into this note but were reviewed prior to creation of Plan. LABS:  I reviewed today's most current labs and imaging studies.   Pertinent labs include:  Recent Labs     05/14/21  0250 05/13/21  1130   WBC 5.3 4.5   HGB 9.3* 8.9*   HCT 28.9* 28.4*    220     Recent Labs     05/13/21  1130      K 3.9      CO2 28   GLU 77   BUN 15   CREA 0.65   CA 8.9   ALB 3.4*   TBILI 0.5   ALT 15   INR 1.1       Signed: Steffen Berg MD

## 2021-05-14 NOTE — PROGRESS NOTES
Speech Pathology  MBS completed. Full liquid diet with small cup sips, extra dry swallows, sit upright during and 60 min after PO recommended. Spoke with RN. Will come and speak with daughter and patient later this afternoon for education. Full report to follow.

## 2021-05-15 ENCOUNTER — HOSPITAL ENCOUNTER (INPATIENT)
Dept: PREADMISSION TESTING | Age: 86
Discharge: HOME | DRG: 069 | End: 2021-05-15
Payer: MEDICARE

## 2021-05-15 VITALS
DIASTOLIC BLOOD PRESSURE: 80 MMHG | OXYGEN SATURATION: 100 % | TEMPERATURE: 97.8 F | WEIGHT: 115.52 LBS | BODY MASS INDEX: 19.72 KG/M2 | HEIGHT: 64 IN | SYSTOLIC BLOOD PRESSURE: 160 MMHG | RESPIRATION RATE: 18 BRPM | HEART RATE: 74 BPM

## 2021-05-15 LAB
GLUCOSE BLD STRIP.AUTO-MCNC: 101 MG/DL (ref 65–117)
GLUCOSE BLD STRIP.AUTO-MCNC: 89 MG/DL (ref 65–117)
SERVICE CMNT-IMP: NORMAL
SERVICE CMNT-IMP: NORMAL

## 2021-05-15 PROCEDURE — 74011250636 HC RX REV CODE- 250/636: Performed by: STUDENT IN AN ORGANIZED HEALTH CARE EDUCATION/TRAINING PROGRAM

## 2021-05-15 PROCEDURE — 74011250637 HC RX REV CODE- 250/637: Performed by: STUDENT IN AN ORGANIZED HEALTH CARE EDUCATION/TRAINING PROGRAM

## 2021-05-15 PROCEDURE — 74011250637 HC RX REV CODE- 250/637: Performed by: HOSPITALIST

## 2021-05-15 PROCEDURE — 82962 GLUCOSE BLOOD TEST: CPT

## 2021-05-15 RX ORDER — SPIRONOLACTONE 25 MG/1
12.5 TABLET ORAL DAILY
Status: DISCONTINUED | OUTPATIENT
Start: 2021-05-15 | End: 2021-05-15 | Stop reason: HOSPADM

## 2021-05-15 RX ORDER — HYDROCHLOROTHIAZIDE 25 MG/1
25 TABLET ORAL
Status: DISCONTINUED | OUTPATIENT
Start: 2021-05-15 | End: 2021-05-15 | Stop reason: HOSPADM

## 2021-05-15 RX ORDER — ASPIRIN 81 MG/1
81 TABLET ORAL DAILY
Qty: 60 TAB | Refills: 0 | Status: SHIPPED | OUTPATIENT
Start: 2021-05-16 | End: 2022-07-21 | Stop reason: ALTCHOICE

## 2021-05-15 RX ORDER — ATORVASTATIN CALCIUM 40 MG/1
40 TABLET, FILM COATED ORAL
Qty: 30 TAB | Refills: 1 | Status: SHIPPED | OUTPATIENT
Start: 2021-05-15 | End: 2021-07-20 | Stop reason: ALTCHOICE

## 2021-05-15 RX ORDER — HYDRALAZINE HYDROCHLORIDE 20 MG/ML
10 INJECTION INTRAMUSCULAR; INTRAVENOUS ONCE
Status: COMPLETED | OUTPATIENT
Start: 2021-05-15 | End: 2021-05-15

## 2021-05-15 RX ORDER — HYDRALAZINE HYDROCHLORIDE 25 MG/1
50 TABLET, FILM COATED ORAL
Status: COMPLETED | OUTPATIENT
Start: 2021-05-15 | End: 2021-05-15

## 2021-05-15 RX ORDER — SPIRONOLACTONE AND HYDROCHLOROTHIAZIDE 25; 25 MG/1; MG/1
1 TABLET ORAL
Qty: 30 TAB | Refills: 0 | Status: SHIPPED | OUTPATIENT
Start: 2021-05-15

## 2021-05-15 RX ADMIN — CLOPIDOGREL BISULFATE 75 MG: 75 TABLET ORAL at 10:51

## 2021-05-15 RX ADMIN — ENOXAPARIN SODIUM 30 MG: 30 INJECTION SUBCUTANEOUS at 14:22

## 2021-05-15 RX ADMIN — MULTIPLE VITAMINS W/ MINERALS TAB 2 TABLET: TAB at 11:59

## 2021-05-15 RX ADMIN — SPIRONOLACTONE 12.5 MG: 25 TABLET ORAL at 10:51

## 2021-05-15 RX ADMIN — PANTOPRAZOLE SODIUM 40 MG: 40 TABLET, DELAYED RELEASE ORAL at 10:51

## 2021-05-15 RX ADMIN — CITALOPRAM HYDROBROMIDE 20 MG: 20 TABLET ORAL at 14:22

## 2021-05-15 RX ADMIN — HYDRALAZINE HYDROCHLORIDE 50 MG: 25 TABLET, FILM COATED ORAL at 14:22

## 2021-05-15 RX ADMIN — ASPIRIN 81 MG: 81 TABLET, DELAYED RELEASE ORAL at 10:50

## 2021-05-15 RX ADMIN — HYDRALAZINE HYDROCHLORIDE 10 MG: 20 INJECTION INTRAMUSCULAR; INTRAVENOUS at 10:57

## 2021-05-15 RX ADMIN — CETIRIZINE HYDROCHLORIDE 5 MG: 10 TABLET, FILM COATED ORAL at 10:57

## 2021-05-15 RX ADMIN — FERROUS SULFATE TAB 325 MG (65 MG ELEMENTAL FE) 325 MG: 325 (65 FE) TAB at 10:50

## 2021-05-15 RX ADMIN — HYDROCHLOROTHIAZIDE 25 MG: 25 TABLET ORAL at 11:59

## 2021-05-15 NOTE — DISCHARGE SUMMARY
Hospitalist Discharge Summary     Patient ID:  Susan Valerio  [id number]  80 y.o.  1935  5/13/2021    PCP on record: Cele Rogers MD    Admit date: 5/13/2021  Discharge date and time: 5/15/2021    DISCHARGE DIAGNOSIS:  TIA  HTN  Dysphagia    CONSULTATIONS:  IP CONSULT TO HOSPITALIST  IP CONSULT TO NEUROLOGY    Excerpted HPI from H&P of Kia Toth MD:  Susan Valerio is a 80 y.o. female with prior hx stroke 20 years ago, lupus, Sjogren's, hyperlipidemia, GERD, chronic dysphagia with acute onset of difficulty speaking at 10:30am while talking to her daughter when returning from a doctor's appointment. She knew what she wanted to say but knew that the words were incorrect and then had difficulty getting words out. This lasted for about 10-15 minutes.       On arrival to ER, noted to have dysarthria and \"Patient had initially some mild expressive aphasia that resolved during her stay and was gone by the time the neurologist evaluation was completed. \"  Level 1 code stroke called in ER. Teleneurologist did not recommend tpa since symptoms resolved but recommended dual platelet therapy.     Patient on plavix for prior stroke. She did not take it today (was supposed to stop for eye surgery next week). She stopped taking plaquenil about 10-14 days ago since there was concern it was affecting her vision. She denied any acute vision change, paresthesia, balance impairment or new focal weakness. Has left arm weakness from prior stroke.     Been having mild dysuria for a few days, no fever.  +chills       ______________________________________________________________________  DISCHARGE SUMMARY/HOSPITAL COURSE:  for full details see H&P, daily progress notes, labs, consult notes. Presented with difficulty speaking that lasted for around 15 minutes. CT head CT angio and MRI brain was negative for any stroke. Evaluated by neurology.   Recommended to continue Plavix, aspirin added and increase the dose of statin. She possibly had a TIA. She was also evaluated by speech and swallow for dysphagia. Modified barium swallow did not show aspiration. She was recommended to be on full liquid diet and follow-up outpatient speech therapy. Her blood pressure is elevated, recommended to double her blood pressure medication. _______________________________________________________________________  Patient seen and examined by me on discharge day. Pertinent Findings:  Gen:    Not in distress  Chest: Clear lungs  CVS:   Regular rhythm. No edema  Abd:  Soft, not distended, not tender  Neuro:  Alert,   _______________________________________________________________________  DISCHARGE MEDICATIONS:   Current Discharge Medication List      START taking these medications    Details   aspirin delayed-release 81 mg tablet Take 1 Tab by mouth daily. Qty: 60 Tab, Refills: 0  Start date: 5/16/2021         CONTINUE these medications which have CHANGED    Details   spironolactone-hydrochlorothiazide (ALDACTAZIDE) 25-25 mg per tablet Take 1 Tab by mouth daily (after lunch). Qty: 30 Tab, Refills: 0  Start date: 5/15/2021    Associated Diagnoses: B12 deficiency; Type II or unspecified type diabetes mellitus with neurological manifestations, not stated as uncontrolled(250.60) ([address] Cerebral thrombosis with cerebral infarction ([address] Dysphagia; Late effects of cerebrovascular disease, dysarthria; Myopathy; Hypothyroid; Essential hypertension; Osteoporosis; Dysarthria due to recent stroke      atorvastatin (LIPITOR) 40 mg tablet Take 1 Tab by mouth daily (after lunch). Qty: 30 Tab, Refills: 1  Start date: 5/15/2021    Associated Diagnoses: B12 deficiency; Type II or unspecified type diabetes mellitus with neurological manifestations, not stated as uncontrolled(250.60) ([address] Cerebral thrombosis with cerebral infarction ([facility] Dysphagia;  Late effects of cerebrovascular disease, dysarthria; Myopathy; Hypothyroid; TIA (transient ischemic attack); Osteoporosis; Dysarthria due to recent stroke         CONTINUE these medications which have NOT CHANGED    Details   esomeprazole (NEXIUM) 40 mg capsule Take 40 mg by mouth daily (with lunch). acetaminophen (TYLENOL) 500 mg tablet Take 500 mg by mouth every six (6) hours as needed for Pain.      peg 400-propylene glycol (Systane, propylene glycoL,) 0.4-0.3 % drop Administer 1 Drop to both eyes two (2) times a day. cetirizine (ZYRTEC) 5 mg tablet Take 5 mg by mouth daily. vitamins  A,C,E-zinc-copper (Ocuvite PreserVision) 7,160 unit- 113 mg-100 unit tab tablet Take 2 Tabs by mouth daily. baclofen (LIORESAL) 10 mg tablet Take  by mouth as needed. diclofenac (VOLTAREN) 1 % topical gel Apply 4 g to affected area as needed. To Knee and left thumb      citalopram (CELEXA) 20 mg tablet Take 20 mg by mouth daily (after lunch). Associated Diagnoses: B12 deficiency; Type II or unspecified type diabetes mellitus with neurological manifestations, not stated as uncontrolled(250.60) ([address] Cerebral thrombosis with cerebral infarction ([address] Dysphagia; Late effects of cerebrovascular disease, dysarthria; Myopathy; Hypothyroid; Occlusion and stenosis of carotid artery without mention of cerebral infarction, bilateral; Osteoporosis; Dysarthria due to recent stroke      clopidogrel (PLAVIX) 75 mg tablet Take 75 mg by mouth daily (after lunch). Associated Diagnoses: B12 deficiency; Type II or unspecified type diabetes mellitus with neurological manifestations, not stated as uncontrolled(250.60) ([address] Cerebral thrombosis with cerebral infarction ([address] Dysphagia; Late effects of cerebrovascular disease, dysarthria; Myopathy; Hypothyroid; Occlusion and stenosis of carotid artery without mention of cerebral infarction, bilateral; Osteoporosis; Dysarthria due to recent stroke               Patient Follow Up Instructions:    Activity: Activity as tolerated  Diet: Cardiac Diet  Wound Care: None needed    Follow-up with your PMD and neurologist    Follow-up Information     Follow up With Specialties Details Why Contact Info    Abdi Washburn MD Family Medicine Schedule an appointment as soon as possible for a visit Contact office to schedule f/u apt within 3-5 days of d/c from the hospital [address]      Jelena Wolfe MD Neurology On 8/23/2021 Go to office for previously scheduled apt [facility]  [phone]          ________________________________________________________________    Risk of deterioration: Moderate    Condition at Discharge:  Stable  __________________________________________________________________    Disposition  Home with family and home health services    ____________________________________________________________________    Code Status: Dnr    ___________________________________________________________________      Total time in minutes spent coordinating this discharge (includes going over instructions, follow-up, prescriptions, and preparing report for sign off to her PCP) :  >30 minutes    Signed:  Carmen Schneider MD

## 2021-05-15 NOTE — PROGRESS NOTES
CL[place] Plan:    -d/c home  -PCP follow up  -[facility] via [address] [place] [address]          14:20PM Accepted w/HH via [facility]. SOC x 24-48 hours. Medicare pt has received, reviewed, and signed 2nd IM letter informing them of their right to appeal the discharge. Signed copy has been placed on pt bedside chart. MORENO Medrano            SW spoke w/patient re: discharge recommendation of [facility]. Patient in agreement, and gave verbal consent for writer to send [facility] referrals out on her behalf w/no preference. Referrals sent via [facility]. Awaiting acceptance. Requires Medicare 2nd IM letter prior to discharge.          MORENO Medrano

## 2021-05-15 NOTE — PROGRESS NOTES
Pt was given discharge instructions, plan of care, follow up appointments and medication regime. All questions were answered and pt is ready for discharge. MD stated that pt can be discharged if BP is less than 180 and give pt a STAT one time dose of hydralazine 50 mg.

## 2021-05-15 NOTE — DISCHARGE INSTRUCTIONS
HOSPITALIST DISCHARGE INSTRUCTIONS    NAME: Caro Lawson   :  1935   MRN:  [id number]     Date/Time:  5/15/2021 2:23 PM    ADMIT DATE: 2021     DISCHARGE DATE: 5/15/2021     DISCHARGE DIAGNOSIS:  TIA  HTN  Dysphagia    Follow-up Information     Follow up With Specialties Details Why Contact Info    Maicol Chandler MD Family Medicine Schedule an appointment as soon as possible for a visit Contact office to schedule f/u apt within 3-5 days of d/c from the hospital [address]      Cassidy Osman MD Neurology On 2021 Go to office for previously scheduled apt [address]  [phone]             MEDICATIONS:  As per medication reconciliation  list  · It is important that you take the medication exactly as they are prescribed. · Keep your medication in the bottles provided by the pharmacist and keep a list of the medication names, dosages, and times to be taken in your wallet. · Do not take other medications without consulting your doctor. Pain Management: per above medications    What to do at Home    Recommended diet:  Cardiac Diet    Recommended activity: Activity as tolerated    If you have questions regarding the hospital related prescriptions or hospital related issues please call [facility] at [phone]. If you experience any of the following symptoms then please call your primary care physician or return to the emergency room if you cannot get hold of your doctor:  Fever, chills, nausea, vomiting, diarrhea, change in mentation, falling, bleeding, shortness of breath,    Follow Up:  Dr. Maicol Chandler MD  you are to call and set up an appointment to see them in 7-10 days. Information obtained by :  I understand that if any problems occur once I am at home I am to contact my physician. I understand and acknowledge receipt of the instructions indicated above. Physician's or R.N.'s Signature                                                                  Date/Time                                                                                                                                              Patient or Representative Signature                                                          Date/Time

## 2021-05-15 NOTE — PROGRESS NOTES
End of Shift Note    Bedside shift change report given to Nhan Cantu RN (oncoming nurse) by Warren Gill (offgoing nurse). Report included the following information SBAR    Shift worked:  nights   Shift summary and any significant changes:    Patient anticipates  Discharge today.      -PATIENT ON FULL LIQUID DIET       Concerns for physician to address:  NONE   Zone phone for oncoming shift:   [phone]     Patient Information  Caro Pospisil  80 y.o.  5/13/2021 11:24 AM by Julia Patino MD. 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/02/2016    Localization-related partial epilepsy with complex partial seizures ([address] 06/02/2016    Stenosis of both carotid arteries without cerebral infarction 06/02/2016    Abnormal involuntary movement, right arm 06/02/2016    Transient ischemic attack involving left internal carotid artery 06/02/2016    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.2) 04/09/2015    Dysphagia 03/24/2015    Myopathy 03/24/2015    Osteoporosis 03/24/2015    Type II or unspecified type diabetes mellitus with neurological manifestations, not stated as uncontrolled(250.60) ([address] 03/24/2015    Hypothyroid 03/24/2015    Dysarthria due to recent stroke 03/24/2015    Dysarthria 03/24/2015     Past Medical History:   Diagnosis Date    Arthritis     Arthropathy     Chronic pain     lupus    Depression     Essential hypertension     Fracture     back    GERD (gastroesophageal reflux disease)     High cholesterol     Ill-defined condition 04/2017    pneumonia    Lupus ([address]     Osteoporosis     Pneumonia     Refusal of blood transfusions as patient is [employer]     Rheumatoid arthritis, unspecified ([address]     Sjoegren syndrome     Stroke ([facility] 1998    no residual weakness       Core Measures:  CVA: Yes Yes  CHF:No No    Activity:  Activity Level: Up with Assistance  Number times ambulated in hallways past shift: 0  Number of times OOB to chair past shift: 1    Cardiac:   Cardiac Monitoring: Yes      Cardiac Rhythm: Sinus Rhythm    Access:   Current line(s): PIV     Genitourinary:   Urinary status: voiding    Respiratory:   O2 Device: None (Room air)  Chronic home O2 use?: N/A  Incentive spirometer at bedside: N/A       GI:  Last Bowel Movement Date: 05/13/21  Current diet:  DIET FULL LIQUID  Passing flatus: NO  Tolerating current diet: YES       Pain Management:   Patient states pain is manageable on current regimen: YES    Skin:  Gil Score: 21  Interventions: turn team and PT/OT consult    Patient Safety:  Fall Score:  Total Score: 3  Interventions: bed/chair alarm, gripper socks, pt to call before getting OOB and stay with me (per policy)  High Fall Risk: Yes  @[place]  no      DVT prophylaxis:  DVT prophylaxis Med- Yes    Active Consults:  IP CONSULT TO HOSPITALIST  IP CONSULT TO NEUROLOGY    Length of Stay:  Expected LOS: 2d 0h  Actual LOS: 2  Discharge Plan: Yes SNF      Helga Castro

## 2021-05-17 ENCOUNTER — PATIENT OUTREACH (OUTPATIENT)
Dept: CASE MANAGEMENT | Age: 86
End: 2021-05-17

## 2021-05-17 NOTE — PROGRESS NOTES
Care Transitions Initial Call    Call within 2 business days of discharge: Yes     Patient: Peter Weaver Patient : 1935 MRN: [id number]    Last Discharge [address]       Complaint Diagnosis Description Type Department Provider    21 Aphasia Acute CVA (cerebrovascular accident) ([facility] . .. ED to Hosp-Admission (Discharged) (ADMIT) Trenton Elder MD; Norman Atkinson.. Was this an external facility discharge? No Discharge Facility: n/a    Discussed COVID-19 related testing which was not done at this time. Test results were not done. Patient informed of results, if available? n/a  Inpatient Readmission Risk score: Unplanned Readmit Risk Score: 6    Was this a readmission? no   Patient stated reason for the admission:  expressive aphasia (resolved)  REHABILITATION [facility] follow up appointment(s):   Future Appointments   Date Time Provider Alaina Burton   2021  2:40 PM Yohan Rivera MD [facility]     Non-[facility] follow up appointment(s):   PCP- Dr. Aviva Bamberger for follow-up call in 3-5 days based on severity of symptoms and risk factors. Plan for next call: attempt to contact patient for hospital follow up  CTN provided contact information for future needs.

## 2021-05-21 ENCOUNTER — PATIENT OUTREACH (OUTPATIENT)
Dept: CASE MANAGEMENT | Age: 86
End: 2021-05-21

## 2021-05-21 RX ORDER — OMEPRAZOLE 40 MG/1
40 CAPSULE, DELAYED RELEASE ORAL DAILY
COMMUNITY
End: 2021-07-20 | Stop reason: ALTCHOICE

## 2021-06-15 ENCOUNTER — PATIENT OUTREACH (OUTPATIENT)
Dept: CASE MANAGEMENT | Age: 86
End: 2021-06-15

## 2021-07-20 ENCOUNTER — OFFICE VISIT (OUTPATIENT)
Dept: NEUROLOGY | Age: 86
End: 2021-07-20
Payer: MEDICARE

## 2021-07-20 VITALS
SYSTOLIC BLOOD PRESSURE: 109 MMHG | OXYGEN SATURATION: 100 % | RESPIRATION RATE: 16 BRPM | WEIGHT: 108 LBS | DIASTOLIC BLOOD PRESSURE: 66 MMHG | BODY MASS INDEX: 18.44 KG/M2 | HEIGHT: 64 IN | HEART RATE: 64 BPM

## 2021-07-20 DIAGNOSIS — I65.23 STENOSIS OF BOTH CAROTID ARTERIES WITHOUT CEREBRAL INFARCTION: ICD-10-CM

## 2021-07-20 DIAGNOSIS — G40.209 LOCALIZATION-RELATED PARTIAL EPILEPSY WITH COMPLEX PARTIAL SEIZURES (HCC): ICD-10-CM

## 2021-07-20 DIAGNOSIS — G45.1 TRANSIENT ISCHEMIC ATTACK INVOLVING LEFT INTERNAL CAROTID ARTERY: ICD-10-CM

## 2021-07-20 DIAGNOSIS — I69.322 DYSARTHRIA DUE TO RECENT STROKE: ICD-10-CM

## 2021-07-20 DIAGNOSIS — I67.89 CEREBRAL MICROVASCULAR DISEASE: Primary | ICD-10-CM

## 2021-07-20 DIAGNOSIS — R13.12 OROPHARYNGEAL DYSPHAGIA: ICD-10-CM

## 2021-07-20 DIAGNOSIS — R25.9 ABNORMAL INVOLUNTARY MOVEMENT: ICD-10-CM

## 2021-07-20 PROCEDURE — G8420 CALC BMI NORM PARAMETERS: HCPCS | Performed by: PSYCHIATRY & NEUROLOGY

## 2021-07-20 PROCEDURE — 1090F PRES/ABSN URINE INCON ASSESS: CPT | Performed by: PSYCHIATRY & NEUROLOGY

## 2021-07-20 PROCEDURE — 99214 OFFICE O/P EST MOD 30 MIN: CPT | Performed by: PSYCHIATRY & NEUROLOGY

## 2021-07-20 PROCEDURE — 1101F PT FALLS ASSESS-DOCD LE1/YR: CPT | Performed by: PSYCHIATRY & NEUROLOGY

## 2021-07-20 PROCEDURE — G8752 SYS BP LESS 140: HCPCS | Performed by: PSYCHIATRY & NEUROLOGY

## 2021-07-20 PROCEDURE — G8427 DOCREV CUR MEDS BY ELIG CLIN: HCPCS | Performed by: PSYCHIATRY & NEUROLOGY

## 2021-07-20 PROCEDURE — G8432 DEP SCR NOT DOC, RNG: HCPCS | Performed by: PSYCHIATRY & NEUROLOGY

## 2021-07-20 PROCEDURE — G8754 DIAS BP LESS 90: HCPCS | Performed by: PSYCHIATRY & NEUROLOGY

## 2021-07-20 PROCEDURE — G8536 NO DOC ELDER MAL SCRN: HCPCS | Performed by: PSYCHIATRY & NEUROLOGY

## 2021-07-20 RX ORDER — ROSUVASTATIN CALCIUM 20 MG/1
20 TABLET, COATED ORAL
COMMUNITY

## 2021-07-20 RX ORDER — ESOMEPRAZOLE MAGNESIUM 40 MG/1
40 CAPSULE, DELAYED RELEASE ORAL DAILY
COMMUNITY

## 2021-07-20 NOTE — LETTER
7/20/2021    Patient: Kalia Trevino   YOB: 1935   Date of Visit: 7/20/2021     Karina Snider MD  [address] [address]  Via Fax: [phone]    Dear Karina Snider MD,      Thank you for referring Ms. Caro Lawson to [facility] for evaluation. My notes for this consultation are attached. Consult    Subjective:     Francy Lawson is a 80 y.o. right-handed  female seen for evaluation at the request of Dr. Oneda Heimlich of problem of new problem of patient having admission to the hospital for TIA that occurred a week or 2 ago when the patient was talking on the phone to her daughter and suddenly could not get her words out, and the spell lasted about 5 to 15 minutes by description in the hospital, and the patient had a CTA of the head neck that just showed mild stenosis of the carotid arteries, no intracranial disease, but she did have microvascular disease but no acute stroke on MRI scan and was diagnosed with a TIA. She sent to us for further follow-up. She is on aspirin therapy now taking it regularly. She also takes Plavix 75 mg we advised her to continue with dual antiplatelet therapy until around September 1 when she can switch to Plavix alone. She also wants to know when safe for her to have cataract surgery and advised that she should wait 3 months after the event that is occurred the end of June she could conceivably get her cataract surgery when she finishes her dual antiplatelet therapy in September but stop the Plavix 5 days before and probably resume it back again today after cataract surgery if there is no major bleeding and this into the ophthalmologist when she should restart it again.   Patient in the past was seen for difficulty swallowing, when we saw her 1 year ago, but she has had her esophagus dilated and treated with Botox and she can swallow solid foods, and no longer on liquid diet and maintaining her weight and does not think she has any new problem but needs serial GI evaluations to make sure she does not need further dilatation. She also has not had a carotid Doppler study done in 2 years and needs a repeat Doppler study done but she has had no recurrent strokelike symptoms or TIAs in the last 2 years. Since her last visit 1 year ago, she had a procedure done by Dr. Jona Anguiano where they cut something in her throat which has allowed her to swallow more food and she is actually maintained her weight. Her voice remains stable and less hoarse. . She has been dilated 3 times and had Botox several times, with only transient improvement in her swallowing ability. She had a negative neurologic workup for neuromuscular disease as a cause of her symptoms, but treating her esophageal stricture does not seem to be helping all that much. On exam again she does not have any clear atrophy or fasciculations of her tongue to suggest motor neuron disease and no systemic weakness, and no fasciculations or hyperreflexia. She had no other bulbar weakness. She had normal CPK enzymes also in the past and myasthenia test negative. Her symptoms are persistent not intermittent like neuromuscular junction disease. She has had no other recurrent neurological TIAs or strokes, and has not had a Doppler in 2 years, so one was ordered for tomorrow, she is to continue her antiplatelet therapy of Plavix. Her stroke was a spell in 2016 of her right arm suddenly became hard to control and had some abnormal dyskinetic movements lasting several minutes, associated with a sensation of weakness and clumsiness. Patient's Dopplers were on remarkable, and she continues Plavix 75 mg once a day without recurrent symptoms. She has had a previous stroke and has diffuse microvascular disease on her MRI scan done one year ago.   Her EEG to rule out seizures in view of her past history of strokes was also normal. She denies any progression of weakness, denies any fasciculation, denies any muscle atrophy, and denies any other difficulty with speaking. She has had no symptoms of ALS. Patient had a normal EMG showing no clear evidence of ALS. Patient's MRI scan just showed age related changes and microvascular disease typical for her age and no other lesions. Patient's carotid Dopplers were normal. All lab tests including myasthenic panel, CPK, and all other tests were negative except for positive test for anti-neuronal antibody which may be a false positive from her Sjogrens syndrome and known lupus. Patient gives a 4 year history of gradually progressive trouble swallowing, with solids more than liquids. She has seen ENT Dr. 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 She had a modified barium swallow that showed moderate pharyngeal dysphagia and mild oral dysphagia, and now a possible stricture. She denies any weakness or sensory loss in her extremities, but does complaint of mild fatigability and generalized weakness. She has no double vision, headaches, sleep problems, gait problems, fever, trauma, or other precipitating causes. She has no family history of similar problems. She had a history of stroke in 1998 that was manifested by speech difficulty and visual loss which resolved after several years without recurrent symptoms. She has a history of Sjogrens syndrome and had her teeth removed because of complications from that. She has occasional choking, and probably had aspiration pneumonia at [facility] last february, but was never seen by neurology. Her bowel and bladder function is stable, and occasionally her knees feel wobbly. She has no major back pain, headache, or neck pain. Her voice has become softer. Patient denies muscle pain, atrophy or fasciculations or cramps.     Past Medical History:   Diagnosis Date    Arthritis     Arthropathy     Chronic pain     lupus    Depression     Essential hypertension     Fracture     back    GERD (gastroesophageal reflux disease)     High cholesterol     Ill-defined condition 04/2017    pneumonia    Lupus ([facility]     Osteoporosis     Pneumonia     Refusal of blood transfusions as patient is [employer]     Rheumatoid arthritis, unspecified ([facility]     Sjoegren syndrome     Stroke ([facility] 1998    no residual weakness      Past Surgical History:   Procedure Laterality Date    HX BILATERAL SALPINGO-OOPHORECTOMY  2013    Dr. Cheryl Denton    HX ENDOSCOPY      HX HEENT  10/2018    throat surgery    HX KYPHOPLASTY  02/2016    Pt states T12 and L1     Family History   Problem Relation Age of Onset    Other Mother         NPH    Stroke Mother     Osteoporosis Mother     Stroke Father     Bipolar Disorder Father     Heart Disease Father     Bipolar Disorder Child       Social History     Tobacco Use    Smoking status: Never Smoker    Smokeless tobacco: Never Used   Substance Use Topics    Alcohol use: Yes     Alcohol/week: 0.8 standard drinks     Types: 1 Glasses of wine per week     Comment: seldomly         Current Outpatient Medications:     rosuvastatin (Crestor) 20 mg tablet, Take 20 mg by mouth nightly., Disp: , Rfl:     esomeprazole (NEXIUM) 40 mg capsule, Take 40 mg by mouth daily. , Disp: , Rfl:     spironolactone-hydrochlorothiazide (ALDACTAZIDE) 25-25 mg per tablet, Take 1 Tab by mouth daily (after lunch). , Disp: 30 Tab, Rfl: 0    aspirin delayed-release 81 mg tablet, Take 1 Tab by mouth daily. , Disp: 60 Tab, Rfl: 0    acetaminophen (TYLENOL) 500 mg tablet, Take 500 mg by mouth every six (6) hours as needed for Pain., Disp: , Rfl:     peg 400-propylene glycol (Systane, propylene glycoL,) 0.4-0.3 % drop, Administer 1 Drop to both eyes two (2) times a day., Disp: , Rfl:     cetirizine (ZYRTEC) 5 mg tablet, Take 5 mg by mouth daily. , Disp: , Rfl:     vitamins  A,C,E-zinc-copper (Ocuvite PreserVision) 7,160 unit- 113 mg-100 unit tab tablet, Take 2 Tabs by mouth daily. , Disp: , Rfl:     baclofen (LIORESAL) 10 mg tablet, Take  by mouth as needed. , Disp: , Rfl:     diclofenac (VOLTAREN) 1 % topical gel, Apply 4 g to affected area as needed. To Knee and left thumb, Disp: , Rfl:     citalopram (CELEXA) 20 mg tablet, Take 20 mg by mouth daily (after lunch). , Disp: , Rfl:     clopidogrel (PLAVIX) 75 mg tablet, Take 75 mg by mouth daily (after lunch). , Disp: , Rfl:         Allergies   Allergen Reactions    Imuran [Azathioprine] Other (comments)     Lupus attack    Iodinated Contrast Media Hives    Gadavist [Gadobutrol] Hives and Itching     Scant hives to back post receiving injection of gadavist. Denied any breathing problems or throat swelling. Resolved within 30 minutes. MRI contrast Gadolinnium)        Review of Systems:  A comprehensive review of systems was negative except for: Constitutional: positive for fatigue and malaise  Respiratory: positive for pneumonia or dyspnea on exertion  Gastrointestinal: positive for dysphagia, dyspepsia and reflux symptoms  Neurological: positive for weakness and dysphasia and dysarthria  Behvioral/Psych: positive for anxiety and depression   Vitals:    07/20/21 0948   BP: 109/66   Pulse: 64   Resp: 16   SpO2: 100%   Weight: 108 lb (49 kg)   Height: 5' 4\" (1.626 m)     Objective:     I      NEUROLOGICAL EXAM:    Appearance: The patient is thinly developed and nourished, provides a coherent history and is in no acute distress. Mental Status: Oriented to time, place and person, and the president, cognitive function is normal, speech minimally dysarthric but no aphasia. Mood and affect appropriate but anxious and depressed. Cranial Nerves:   Intact visual fields. Fundi are benign, discs are poorly seen. SO, EOM's full, no nystagmus, no ptosis. Facial sensation is normal. Corneal reflexes are not tested. Facial movement is symmetric, there may be slight facial weakness noted on forced eyelid closure. Yenifer Fleming Hearing is abnormal bilaterally. Palate is midline with normal sternocleidomastoid and trapezius muscles are normal. Tongue is midline, with no obvious fasciculations. Neck is supple without meningismus or bruits  Temporal arteries are not tender or enlarged   Motor:  4/5 strength in upper and lower proximal and distal muscles. Normal bulk and tone. No fasciculations. Rapid alternating movement is a little slow bilaterally   Reflexes:   Deep tendon reflexes 1+/4 and symmetrical and slightly brisk. No babinski or clonus present   Sensory:   Normal to touch, pinprick and vibration, and DSS. Gait:  Normal gait for age though she does move a little slowly. Tremor:   No tremor noted. Cerebellar:  No cerebellar signs present on finger-nose-finger exam, Romberg and tandem are mildly abnormal.   Neurovascular:  Normal heart sounds and regular rhythm, peripheral pulses decreased, and no carotid bruits. Assessment:       ICD-10-CM ICD-9-CM    1. Cerebral microvascular disease  I67.89 437.8 DUPLEX CAROTID BILATERAL   2. Stenosis of both carotid arteries without cerebral infarction  I65.23 433.10 DUPLEX CAROTID BILATERAL     433.30    3. Transient ischemic attack involving left internal carotid artery  G45.1 435.8 DUPLEX CAROTID BILATERAL   4. Dysarthria due to recent stroke  I69.322 438.13 DUPLEX CAROTID BILATERAL   5. Oropharyngeal dysphagia  R13.12 787.22 DUPLEX CAROTID BILATERAL   6. 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  Abnormal involuntary movement, right arm  R25.9 781.0 DUPLEX CAROTID BILATERAL         Plan:     Patient with new problem of recent TIA manifested by what sounds like an expressive aphasia, but MRI scan did not show any new acute stroke and she is on dual antiplatelet therapy now, no recurrent symptoms, we advised her to continue her current treatment until September 1 and then switch to Plavix alone he can proceed with cataract surgery then if neurologically stable but stop her Plavix 5 days before the procedure and can resume it the day after the procedure if okay with ophthalmology. She had a CTA of the head neck that just shows mild stenosis of the carotid bifurcations, we will check a Doppler study to see if there is any change from the previous study done 2 years ago because of dizziness. Patient with difficulty swallowing over the last year, but seems stable over the last year, not really progressing, and maintaining her weight. Patient still has no signs of motor neuron disease or any neuromuscular disease that I can see at this time. She does have mild bifacial weakness that is a little unclear. She has no atrophy or fasciculations of her tongue, no other bulbar weakness, and no systemic weakness or fasciculations to suggest motor neuron disease or neuromuscular junction disease  She has had no more strokelike symptoms, and will get another carotid Doppler next visit since is been 2 year since her last Doppler. She is to continue her Plavix  Patient has had surgery for cricopharyngeal muscle spasms at times and several Botox injections without improvement in her symptoms but only transitory improvement  No evidence of ALS so far. She is to continue her Plavix therapy and call us if any change in call for results of her tests  Patient with dysphagia and dysarthria and neuromuscular disease of the oral pharynx, without clear fasciculations or atrophy of her muscles or tongue. MRI and EMG and blood tests are all unremarkable except for a questionable anti-neuronal antibody probably related to lupus  We will repeat that next visit. Patient states feels much better, gaining weight, getting stronger all the time and exercising more.   She will call if any problem in the interim, and followup will be arranged in 6 months time or earlier if needed  35 minutes spent with patient and her son-in-law in detail exam, checking her carefully for any fasciculations upper extremities or tongue, and discussing her possible diagnosis, prognosis and treatment    Signed By: Jazmin Oliver MD     July 20, 2021                     If you have questions, please do not hesitate to call me. I look forward to following your patient along with you.       Sincerely,    Jazmin Oliver MD

## 2021-07-21 NOTE — PROGRESS NOTES
Consult    Subjective:     Talon Lawson is a 80 y.o. right-handed  female seen for evaluation at the request of Dr. Kurtis Morillo of problem of new problem of patient having admission to the hospital for TIA that occurred a week or 2 ago when the patient was talking on the phone to her daughter and suddenly could not get her words out, and the spell lasted about 5 to 15 minutes by description in the hospital, and the patient had a CTA of the head neck that just showed mild stenosis of the carotid arteries, no intracranial disease, but she did have microvascular disease but no acute stroke on MRI scan and was diagnosed with a TIA. She sent to us for further follow-up. She is on aspirin therapy now taking it regularly. She also takes Plavix 75 mg we advised her to continue with dual antiplatelet therapy until around September 1 when she can switch to Plavix alone. She also wants to know when safe for her to have cataract surgery and advised that she should wait 3 months after the event that is occurred the end of June she could conceivably get her cataract surgery when she finishes her dual antiplatelet therapy in September but stop the Plavix 5 days before and probably resume it back again today after cataract surgery if there is no major bleeding and this into the ophthalmologist when she should restart it again. Patient in the past was seen for difficulty swallowing, when we saw her 1 year ago, but she has had her esophagus dilated and treated with Botox and she can swallow solid foods, and no longer on liquid diet and maintaining her weight and does not think she has any new problem but needs serial GI evaluations to make sure she does not need further dilatation. She also has not had a carotid Doppler study done in 2 years and needs a repeat Doppler study done but she has had no recurrent strokelike symptoms or TIAs in the last 2 years.  Since her last visit 1 year ago, she had a procedure done by  [facility] where they cut something in her throat which has allowed her to swallow more food and she is actually maintained her weight. Her voice remains stable and less hoarse. . She has been dilated 3 times and had Botox several times, with only transient improvement in her swallowing ability. She had a negative neurologic workup for neuromuscular disease as a cause of her symptoms, but treating her esophageal stricture does not seem to be helping all that much. On exam again she does not have any clear atrophy or fasciculations of her tongue to suggest motor neuron disease and no systemic weakness, and no fasciculations or hyperreflexia. She had no other bulbar weakness. She had normal CPK enzymes also in the past and myasthenia test negative. Her symptoms are persistent not intermittent like neuromuscular junction disease. She has had no other recurrent neurological TIAs or strokes, and has not had a Doppler in 2 years, so one was ordered for tomorrow, she is to continue her antiplatelet therapy of Plavix. Her stroke was a spell in 2016 of her right arm suddenly became hard to control and had some abnormal dyskinetic movements lasting several minutes, associated with a sensation of weakness and clumsiness. Patient's Dopplers were on remarkable, and she continues Plavix 75 mg once a day without recurrent symptoms. She has had a previous stroke and has diffuse microvascular disease on her MRI scan done one year ago. Her EEG to rule out seizures in view of her past history of strokes was also normal. She denies any progression of weakness, denies any fasciculation, denies any muscle atrophy, and denies any other difficulty with speaking. She has had no symptoms of ALS. Patient had a normal EMG showing no clear evidence of ALS. Patient's MRI scan just showed age related changes and microvascular disease typical for her age and no other lesions.  Patient's carotid Dopplers were normal. All lab tests including myasthenic panel, CPK, and all other tests were negative except for positive test for anti-neuronal antibody which may be a false positive from her Sjogrens syndrome and known lupus. Patient gives a 4 year history of gradually progressive trouble swallowing, with solids more than liquids. She has seen ENT Dr. 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 She had a modified barium swallow that showed moderate pharyngeal dysphagia and mild oral dysphagia, and now a possible stricture. She denies any weakness or sensory loss in her extremities, but does complaint of mild fatigability and generalized weakness. She has no double vision, headaches, sleep problems, gait problems, fever, trauma, or other precipitating causes. She has no family history of similar problems. She had a history of stroke in 1998 that was manifested by speech difficulty and visual loss which resolved after several years without recurrent symptoms. She has a history of Sjogrens syndrome and had her teeth removed because of complications from that. She has occasional choking, and probably had aspiration pneumonia at [facility] last february, but was never seen by neurology. Her bowel and bladder function is stable, and occasionally her knees feel wobbly. She has no major back pain, headache, or neck pain. Her voice has become softer. Patient denies muscle pain, atrophy or fasciculations or cramps.     Past Medical History:   Diagnosis Date    Arthritis     Arthropathy     Chronic pain     lupus    Depression     Essential hypertension     Fracture     back    GERD (gastroesophageal reflux disease)     High cholesterol     Ill-defined condition 04/2017    pneumonia    [facility]upus [facility]     Osteoporosis     Pneumonia     Refusal of blood transfusions as patient is [employer]     Rheumatoid arthritis, unspecified ([facility]     Sjoegren syndrome     Stroke ([facility] 1998    no residual weakness      Past Surgical History:   Procedure Laterality Date    HX BILATERAL SALPINGO-OOPHORECTOMY  2013    Dr. Alok Del Cid HX HEENT  10/2018    throat surgery    HX KYPHOPLASTY  02/2016    Pt states T12 and L1     Family History   Problem Relation Age of Onset    Other Mother         NPH    Stroke Mother     Osteoporosis Mother     Stroke Father     Bipolar Disorder Father     Heart Disease Father     Bipolar Disorder Child       Social History     Tobacco Use    Smoking status: Never Smoker    Smokeless tobacco: Never Used   Substance Use Topics    Alcohol use: Yes     Alcohol/week: 0.8 standard drinks     Types: 1 Glasses of wine per week     Comment: seldomly         Current Outpatient Medications:     rosuvastatin (Crestor) 20 mg tablet, Take 20 mg by mouth nightly., Disp: , Rfl:     esomeprazole (NEXIUM) 40 mg capsule, Take 40 mg by mouth daily. , Disp: , Rfl:     spironolactone-hydrochlorothiazide (ALDACTAZIDE) 25-25 mg per tablet, Take 1 Tab by mouth daily (after lunch). , Disp: 30 Tab, Rfl: 0    aspirin delayed-release 81 mg tablet, Take 1 Tab by mouth daily. , Disp: 60 Tab, Rfl: 0    acetaminophen (TYLENOL) 500 mg tablet, Take 500 mg by mouth every six (6) hours as needed for Pain., Disp: , Rfl:     peg 400-propylene glycol (Systane, propylene glycoL,) 0.4-0.3 % drop, Administer 1 Drop to both eyes two (2) times a day., Disp: , Rfl:     cetirizine (ZYRTEC) 5 mg tablet, Take 5 mg by mouth daily. , Disp: , Rfl:     vitamins  A,C,E-zinc-copper (Ocuvite PreserVision) 7,160 unit- 113 mg-100 unit tab tablet, Take 2 Tabs by mouth daily. , Disp: , Rfl:     baclofen (LIORESAL) 10 mg tablet, Take  by mouth as needed. , Disp: , Rfl:     diclofenac (VOLTAREN) 1 % topical gel, Apply 4 g to affected area as needed. To Knee and left thumb, Disp: , Rfl:     citalopram (CELEXA) 20 mg tablet, Take 20 mg by mouth daily (after lunch). , Disp: , Rfl:     clopidogrel (PLAVIX) 75 mg tablet, Take 75 mg by mouth daily (after lunch). , Disp: , Rfl:         Allergies   Allergen Reactions    Imuran [Azathioprine] Other (comments)     Lupus attack    Iodinated Contrast Media Hives    Gadavist [Gadobutrol] Hives and Itching     Scant hives to back post receiving injection of gadavist. Denied any breathing problems or throat swelling. Resolved within 30 minutes. MRI contrast Gadolinnium)        Review of Systems:  A comprehensive review of systems was negative except for: Constitutional: positive for fatigue and malaise  Respiratory: positive for pneumonia or dyspnea on exertion  Gastrointestinal: positive for dysphagia, dyspepsia and reflux symptoms  Neurological: positive for weakness and dysphasia and dysarthria  Behvioral/Psych: positive for anxiety and depression   Vitals:    07/20/21 0948   BP: 109/66   Pulse: 64   Resp: 16   SpO2: 100%   Weight: 108 lb (49 kg)   Height: 5' 4\" (1.626 m)     Objective:     I      NEUROLOGICAL EXAM:    Appearance: The patient is thinly developed and nourished, provides a coherent history and is in no acute distress. Mental Status: Oriented to time, place and person, and the president, cognitive function is normal, speech minimally dysarthric but no aphasia. Mood and affect appropriate but anxious and depressed. Cranial Nerves:   Intact visual fields. Fundi are benign, discs are poorly seen. SO, EOM's full, no nystagmus, no ptosis. Facial sensation is normal. Corneal reflexes are not tested. Facial movement is symmetric, there may be slight facial weakness noted on forced eyelid closure. Gonzalez Jasbir Hearing is abnormal bilaterally. Palate is midline with normal sternocleidomastoid and trapezius muscles are normal. Tongue is midline, with no obvious fasciculations. Neck is supple without meningismus or bruits  Temporal arteries are not tender or enlarged   Motor:  4/5 strength in upper and lower proximal and distal muscles. Normal bulk and tone.  No fasciculations. Rapid alternating movement is a little slow bilaterally   Reflexes:   Deep tendon reflexes 1+/4 and symmetrical and slightly brisk. No babinski or clonus present   Sensory:   Normal to touch, pinprick and vibration, and DSS. Gait:  Normal gait for age though she does move a little slowly. Tremor:   No tremor noted. Cerebellar:  No cerebellar signs present on finger-nose-finger exam, Romberg and tandem are mildly abnormal.   Neurovascular:  Normal heart sounds and regular rhythm, peripheral pulses decreased, and no carotid bruits. Assessment:       ICD-10-CM ICD-9-CM    1. Cerebral microvascular disease  I67.89 437.8 DUPLEX CAROTID BILATERAL   2. Stenosis of both carotid arteries without cerebral infarction  I65.23 433.10 DUPLEX CAROTID BILATERAL     433.30    3. Transient ischemic attack involving left internal carotid artery  G45.1 435.8 DUPLEX CAROTID BILATERAL   4. Dysarthria due to recent stroke  I69.322 438.13 DUPLEX CAROTID BILATERAL   5. Oropharyngeal dysphagia  R13.12 787.22 DUPLEX CAROTID BILATERAL   6. 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 Abnormal involuntary movement, right arm  R25.9 781.0 DUPLEX CAROTID BILATERAL         Plan:     Patient with new problem of recent TIA manifested by what sounds like an expressive aphasia, but MRI scan did not show any new acute stroke and she is on dual antiplatelet therapy now, no recurrent symptoms, we advised her to continue her current treatment until September 1 and then switch to Plavix alone he can proceed with cataract surgery then if neurologically stable but stop her Plavix 5 days before the procedure and can resume it the day after the procedure if okay with ophthalmology.   She had a CTA of the head neck that just shows mild stenosis of the carotid bifurcations, we will check a Doppler study to see if there is any change from the previous study done 2 years ago because of dizziness. Patient with difficulty swallowing over the last year, but seems stable over the last year, not really progressing, and maintaining her weight. Patient still has no signs of motor neuron disease or any neuromuscular disease that I can see at this time. She does have mild bifacial weakness that is a little unclear. She has no atrophy or fasciculations of her tongue, no other bulbar weakness, and no systemic weakness or fasciculations to suggest motor neuron disease or neuromuscular junction disease  She has had no more strokelike symptoms, and will get another carotid Doppler next visit since is been 2 year since her last Doppler. She is to continue her Plavix  Patient has had surgery for cricopharyngeal muscle spasms at times and several Botox injections without improvement in her symptoms but only transitory improvement  No evidence of ALS so far. She is to continue her Plavix therapy and call us if any change in call for results of her tests  Patient with dysphagia and dysarthria and neuromuscular disease of the oral pharynx, without clear fasciculations or atrophy of her muscles or tongue. MRI and EMG and blood tests are all unremarkable except for a questionable anti-neuronal antibody probably related to lupus  We will repeat that next visit. Patient states feels much better, gaining weight, getting stronger all the time and exercising more.   She will call if any problem in the interim, and followup will be arranged in 6 months time or earlier if needed  35 minutes spent with patient and her son-in-law in detail exam, checking her carefully for any fasciculations upper extremities or tongue, and discussing her possible diagnosis, prognosis and treatment    Signed By: Ela Brush MD     July 20, 2021

## 2021-08-19 NOTE — PERIOP NOTES
Reviewed neuro notes with Ines Mueller NP, and Dr. Lisa Cano not having pts come off blood thinners for procedure. Okay to proceed with this surgery.

## 2021-08-23 NOTE — PERIOP NOTES
[facility]  Ambulatory Surgery Unit  Pre-operative Instructions    Surgery/Procedure Date  Wednesday, September 1, 2021            Tentative Arrival Time TBD      1. On the day of your surgery/procedure, please report to the Ambulatory Surgery Unit Registration Desk and sign in at your designated time. The Ambulatory Surgery Unit is located in [facility] on the [facility] side of the [facility] across from the [address] [place] [address]. Please have all of your health insurance cards and a photo ID. 2. You must have someone with you to drive you home, as you should not drive a car for 24 hours following anesthesia. Please make arrangements for a responsible adult friend or family member to stay with you for at least the first 24 hours after your surgery. 3. Do not have anything to eat or drink (including water, gum, mints, coffee, juice) after 11:59 PM, Tuesday. This may not apply to medications prescribed by your physician. (Please note below the special instructions with medications to take the morning of surgery, if applicable.)    4. We recommend you do not drink any alcoholic beverages for 24 hours before and after your surgery. 5. Contact your surgeons office for instructions on the following medications: non-steroidal anti-inflammatory drugs (i.e. Advil, Aleve), vitamins, and supplements. (Some surgeons will want you to stop these medications prior to surgery and others may allow you to take them)   **If you are currently taking Plavix, Coumadin, Aspirin and/or other blood-thinning agents, contact your surgeon for instructions. ** Your surgeon will partner with the physician prescribing these medications to determine if it is safe to stop or if you need to continue taking. Please do not stop taking these medications without instructions from your surgeon.     6. In an effort to help prevent surgical site infection, we ask that you shower with an anti-bacterial soap (i.e. Dial/Safeguard, or the soap provided to you at your preadmission testing appointment) for 3 days prior to and on the morning of surgery, using a fresh towel after each shower. (Please begin this process with fresh bed linens.) Do not apply any lotions, powders, or deodorants after the shower on the day of your procedure. If applicable, please do not shave the operative site for 48 hours prior to surgery. 7. Wear comfortable clothes. Wear glasses instead of contacts. Do not bring any jewelry or money (other than copays or fees as instructed). Do not wear make-up, particularly mascara, the morning of your surgery. Do not wear nail polish, particularly if you are having foot /hand surgery. Wear your hair loose or down, no ponytails, buns, abdi pins or clips. All body piercings must be removed. 8. You should understand that if you do not follow these instructions your surgery may be cancelled. If your physical condition changes (i.e. fever, cold or flu) please contact your surgeon as soon as possible. 9. It is important that you be on time. If a situation occurs where you may be late, or if you have any questions or problems, please call [phone].    10. Your surgery time may be subject to change. You will receive a phone call the day prior to surgery to confirm your arrival time. 11. Pediatric patients: please bring a change of clothes, diapers, bottle/sippy cup, pacifier, etc.      Special Instructions: Take all medications and inhalers, as prescribed, on the morning of surgery with a sip of water. I understand a pre-operative phone call will be made to verify my surgery time. In the event that I am not available, I give permission for a message to be left on my answering service and/or with another person? yes    Preop instructions reviewed  Pt verbalized understanding.  Bring covid card dos.     ___________________      ___________________      ________________  (Signature of Patient)          (Witness)                   (Date and Time)

## 2021-08-31 ENCOUNTER — ANESTHESIA EVENT (OUTPATIENT)
Dept: SURGERY | Age: 86
End: 2021-08-31
Payer: MEDICARE

## 2021-09-01 ENCOUNTER — HOSPITAL ENCOUNTER (OUTPATIENT)
Age: 86
Setting detail: OUTPATIENT SURGERY
Discharge: HOME | End: 2021-09-01
Attending: OPHTHALMOLOGY | Admitting: OPHTHALMOLOGY
Payer: MEDICARE

## 2021-09-01 ENCOUNTER — ANESTHESIA (OUTPATIENT)
Dept: SURGERY | Age: 86
End: 2021-09-01
Payer: MEDICARE

## 2021-09-01 VITALS
WEIGHT: 109 LBS | TEMPERATURE: 97.3 F | BODY MASS INDEX: 18.61 KG/M2 | HEIGHT: 64 IN | HEART RATE: 50 BPM | SYSTOLIC BLOOD PRESSURE: 170 MMHG | OXYGEN SATURATION: 100 % | DIASTOLIC BLOOD PRESSURE: 70 MMHG | RESPIRATION RATE: 17 BRPM

## 2021-09-01 PROCEDURE — 76030000002 HC AMB SURG OR TIME FIRST 0.: Performed by: OPHTHALMOLOGY

## 2021-09-01 PROCEDURE — 74011000250 HC RX REV CODE- 250

## 2021-09-01 PROCEDURE — 76210000046 HC AMBSU PH II REC FIRST 0.5 HR: Performed by: OPHTHALMOLOGY

## 2021-09-01 PROCEDURE — 74011250636 HC RX REV CODE- 250/636: Performed by: OPHTHALMOLOGY

## 2021-09-01 PROCEDURE — 2709999900 HC NON-CHARGEABLE SUPPLY: Performed by: OPHTHALMOLOGY

## 2021-09-01 PROCEDURE — 74011250637 HC RX REV CODE- 250/637: Performed by: OPHTHALMOLOGY

## 2021-09-01 PROCEDURE — 74011250636 HC RX REV CODE- 250/636: Performed by: NURSE ANESTHETIST, CERTIFIED REGISTERED

## 2021-09-01 PROCEDURE — 76060000073 HC AMB SURG ANES FIRST 0.5 HR: Performed by: OPHTHALMOLOGY

## 2021-09-01 PROCEDURE — 74011000250 HC RX REV CODE- 250: Performed by: NURSE ANESTHETIST, CERTIFIED REGISTERED

## 2021-09-01 PROCEDURE — 76210000040 HC AMBSU PH I REC FIRST 0.5 HR: Performed by: OPHTHALMOLOGY

## 2021-09-01 PROCEDURE — V2632 POST CHMBR INTRAOCULAR LENS: HCPCS | Performed by: OPHTHALMOLOGY

## 2021-09-01 PROCEDURE — 74011000250 HC RX REV CODE- 250: Performed by: OPHTHALMOLOGY

## 2021-09-01 DEVICE — LENS IOL POST 1-PC 6X13 19.0. -- ACRYSOF: Type: IMPLANTABLE DEVICE | Site: EYE | Status: FUNCTIONAL

## 2021-09-01 RX ORDER — SODIUM CHLORIDE 0.9 % (FLUSH) 0.9 %
5-40 SYRINGE (ML) INJECTION EVERY 8 HOURS
Status: DISCONTINUED | OUTPATIENT
Start: 2021-09-01 | End: 2021-09-01 | Stop reason: HOSPADM

## 2021-09-01 RX ORDER — TROPICAMIDE 10 MG/ML
SOLUTION/ DROPS OPHTHALMIC
Status: COMPLETED
Start: 2021-09-01 | End: 2021-09-01

## 2021-09-01 RX ORDER — SODIUM CHLORIDE 0.9 % (FLUSH) 0.9 %
5-40 SYRINGE (ML) INJECTION AS NEEDED
Status: DISCONTINUED | OUTPATIENT
Start: 2021-09-01 | End: 2021-09-01 | Stop reason: HOSPADM

## 2021-09-01 RX ORDER — ONDANSETRON 2 MG/ML
4 INJECTION INTRAMUSCULAR; INTRAVENOUS AS NEEDED
Status: DISCONTINUED | OUTPATIENT
Start: 2021-09-01 | End: 2021-09-01 | Stop reason: HOSPADM

## 2021-09-01 RX ORDER — PROPOFOL 10 MG/ML
INJECTION, EMULSION INTRAVENOUS AS NEEDED
Status: DISCONTINUED | OUTPATIENT
Start: 2021-09-01 | End: 2021-09-01 | Stop reason: HOSPADM

## 2021-09-01 RX ORDER — TETRACAINE HYDROCHLORIDE 5 MG/ML
1 SOLUTION OPHTHALMIC
Status: ACTIVE | OUTPATIENT
Start: 2021-09-01 | End: 2021-09-01

## 2021-09-01 RX ORDER — ERYTHROMYCIN 5 MG/G
OINTMENT OPHTHALMIC ONCE
Status: COMPLETED | OUTPATIENT
Start: 2021-09-01 | End: 2021-09-01

## 2021-09-01 RX ORDER — DIPHENHYDRAMINE HYDROCHLORIDE 50 MG/ML
12.5 INJECTION, SOLUTION INTRAMUSCULAR; INTRAVENOUS AS NEEDED
Status: DISCONTINUED | OUTPATIENT
Start: 2021-09-01 | End: 2021-09-01 | Stop reason: HOSPADM

## 2021-09-01 RX ORDER — FENTANYL CITRATE 50 UG/ML
25 INJECTION, SOLUTION INTRAMUSCULAR; INTRAVENOUS
Status: DISCONTINUED | OUTPATIENT
Start: 2021-09-01 | End: 2021-09-01 | Stop reason: HOSPADM

## 2021-09-01 RX ORDER — DICLOFENAC SODIUM 1 MG/ML
1 SOLUTION/ DROPS OPHTHALMIC ONCE
Status: COMPLETED | OUTPATIENT
Start: 2021-09-01 | End: 2021-09-01

## 2021-09-01 RX ORDER — DICLOFENAC SODIUM 1 MG/ML
SOLUTION/ DROPS OPHTHALMIC
Status: COMPLETED
Start: 2021-09-01 | End: 2021-09-01

## 2021-09-01 RX ORDER — LIDOCAINE HYDROCHLORIDE 10 MG/ML
0.1 INJECTION, SOLUTION EPIDURAL; INFILTRATION; INTRACAUDAL; PERINEURAL AS NEEDED
Status: DISCONTINUED | OUTPATIENT
Start: 2021-09-01 | End: 2021-09-01 | Stop reason: HOSPADM

## 2021-09-01 RX ORDER — PREDNISOLONE ACETATE 10 MG/ML
1 SUSPENSION/ DROPS OPHTHALMIC 2 TIMES DAILY
Status: DISCONTINUED | OUTPATIENT
Start: 2021-09-01 | End: 2021-09-01 | Stop reason: HOSPADM

## 2021-09-01 RX ORDER — SODIUM CHLORIDE 9 MG/ML
25 INJECTION, SOLUTION INTRAVENOUS CONTINUOUS
Status: DISCONTINUED | OUTPATIENT
Start: 2021-09-01 | End: 2021-09-01 | Stop reason: HOSPADM

## 2021-09-01 RX ORDER — POVIDONE-IODINE 10 %
SOLUTION, NON-ORAL TOPICAL
Status: DISCONTINUED | OUTPATIENT
Start: 2021-09-01 | End: 2021-09-01 | Stop reason: HOSPADM

## 2021-09-01 RX ORDER — MIDAZOLAM HYDROCHLORIDE 1 MG/ML
INJECTION, SOLUTION INTRAMUSCULAR; INTRAVENOUS AS NEEDED
Status: DISCONTINUED | OUTPATIENT
Start: 2021-09-01 | End: 2021-09-01 | Stop reason: HOSPADM

## 2021-09-01 RX ORDER — SODIUM CHLORIDE, SODIUM LACTATE, POTASSIUM CHLORIDE, CALCIUM CHLORIDE 600; 310; 30; 20 MG/100ML; MG/100ML; MG/100ML; MG/100ML
25 INJECTION, SOLUTION INTRAVENOUS CONTINUOUS
Status: DISCONTINUED | OUTPATIENT
Start: 2021-09-01 | End: 2021-09-01 | Stop reason: HOSPADM

## 2021-09-01 RX ORDER — PROPARACAINE HYDROCHLORIDE 5 MG/ML
1 SOLUTION/ DROPS OPHTHALMIC ONCE
Status: COMPLETED | OUTPATIENT
Start: 2021-09-01 | End: 2021-09-01

## 2021-09-01 RX ORDER — LIDOCAINE HYDROCHLORIDE 40 MG/ML
3 INJECTION, SOLUTION RETROBULBAR; TOPICAL ONCE
Status: DISCONTINUED | OUTPATIENT
Start: 2021-09-01 | End: 2021-09-01 | Stop reason: HOSPADM

## 2021-09-01 RX ORDER — TROPICAMIDE 10 MG/ML
1 SOLUTION/ DROPS OPHTHALMIC ONCE
Status: COMPLETED | OUTPATIENT
Start: 2021-09-01 | End: 2021-09-01

## 2021-09-01 RX ADMIN — SODIUM CHLORIDE 25 ML/HR: 9 INJECTION, SOLUTION INTRAVENOUS at 07:57

## 2021-09-01 RX ADMIN — DICLOFENAC SODIUM 1 DROP: 1 SOLUTION/ DROPS OPHTHALMIC at 07:48

## 2021-09-01 RX ADMIN — MIDAZOLAM HYDROCHLORIDE 0.5 MG: 1 INJECTION, SOLUTION INTRAMUSCULAR; INTRAVENOUS at 08:13

## 2021-09-01 RX ADMIN — TROPICAMIDE 1 DROP: 10 SOLUTION/ DROPS OPHTHALMIC at 07:48

## 2021-09-01 RX ADMIN — PROPOFOL 10 MG: 10 INJECTION, EMULSION INTRAVENOUS at 08:18

## 2021-09-01 RX ADMIN — PROPARACAINE HYDROCHLORIDE 1 DROP: 5 SOLUTION/ DROPS OPHTHALMIC at 07:48

## 2021-09-01 RX ADMIN — SODIUM CHLORIDE 4 MCG: 900 INJECTION, SOLUTION INTRAVENOUS at 08:18

## 2021-09-01 NOTE — OP NOTES
Name: Nick Verde MASC-4        updated 3/1/2013  Description:  Kelly Lizarraga with intraocular lens implant    PREOPERATIVE DIAGNOSIS: Visually impairing combined cataract, Left eye. POSTOPERATIVE DIAGNOSIS: Visually impairing combined   cataract,Left eye. OPERATION: Procedure(s):  LEFT EYE FIRST REFRACTIVE CATARACT REMOVAL WITH LENS IMPLANTATION    ANESTHESIA: Topical with intravenous sedation    TYPE OF LENS IMPLANT USED:   Implant Name Type Inv. Item Serial No.  Lot No. LRB No. Used Action   LENS IOL POST 1-PC 6X13 19.0. -- [employer] - [id number]  LENS IOL POST 1-PC 6X13 19.0. -- [employer] [id number] [employer]WD N/A Left 1 Implanted       PHACO TIME:   68 seconds at an average power of 13.1%. Estimated blood loss: None    Complications:None    Specimen removed: None    DESCRIPTION OF PROCEDURE:  The patient was brought to the holding area where an IV was begun at a  keep open rate. The patient was connected to cardiovascular monitoring. The patient received 1 drop of mydriacyl 1% and proparacaine 0.5%. The patient was then brought back to the operating suite and cardiovascular monitoring was reestablished. The patient was  prepped and draped in the usual manner for ocular surgery. The patient received 1 drop of Proparacaine followed by 2 drops of 5% Betadine solution in the inferior conjunctival cul-de-sac The operating microscope was brought into position and 4% Xylocaine drops were instilled onto the eye. The lid speculum was set into position. A paracentesis was created and Sugarcane solution was instilled into the anterior chamber for adequate anesthesia and pupillary dilation. Viscoelastic was instilled into the anterior chamber. The 2.4 mm keratome was then utilized to create a clear corneal incision, and a circular capsulorrhexis was performed. BSS was utilized to perform careful hydrodissection of the lens.  Viscoelastic was then instilled into the anterior chamber to protect the corneal endothelium. Phacoemulsification was then carried out. Any remaining cortical material was removed in irrigation/aspiration mode. Viscoelastic was then instilled into the capsular bag. The lens implant was inspected for any defects. After finding none, the lens was placed in its injector and inserted into the capsular bag. The lens implant was centered on the patient's visual/astigmatic axis. The viscoelastic was then removed from the eye. Miochol was instilled posterior to the iris plane to facilitate pupillary miosis. The wound was checked for any leaks, after finding none, Iopidine and Pred Forte drops were instilled into the inferior cul-de-sac, and erythromycin ointment was placed over the cornea. A semi-pressure dressing and shield were then placed for the patient. The patient tolerated the procedure very well, and was brought to the recovery room in an alert and stable and satisfactory postoperative condition. Instructions were given to the patient and their family for their immediate postoperative care.   [address] [place] [address]  9/1/2021

## 2021-09-01 NOTE — PERIOP NOTES
Caro Pospisil  1935  [id number]    Situation:  Verbal report given from: Iona Loera RN and ANDRE Levin CRNA  Procedure: Procedure(s):  LEFT EYE FIRST REFRACTIVE CATARACT REMOVAL WITH LENS IMPLANTATION    Background:    Preoperative diagnosis: Combined forms of age-related cataract of left eye [H25.812]    Postoperative diagnosis: Combined forms of age-related cataract of left eye [H25.812]    :  Dr. Lauren Newton    Assistant(s): Circ-1: Julito Nguyen RN  Circ-2: Annette Zavaleta RN  Scrub Tech-1: Karen Tobias    Specimens: * No specimens in log *    Assessment:  Intra-procedure medications         Anesthesia gave intra-procedure sedation and medications, see anesthesia flow sheet     Intravenous fluids: NS@ Joan David     Vital signs stable       Recommendation:    Permission to share finding with daughter Vi : yes

## 2021-09-01 NOTE — PERIOP NOTES
Received to PACU, drowsy but opens eyes and answers questions. 3626 D/C instructions reviewed with daughter Vi via phone. 0904 Pt rousing, HOB elevated. Sipping water. 8445 Discharged to home via/wc,accompanied to car per RN. Skin warm and dry, awake and alert. Respirations even, unlabored. Pt and family members questions and concerns addressed prior to discharge. All belongings (dentures in mouth. Glasses, cane) with pt.

## 2021-09-01 NOTE — ANESTHESIA POSTPROCEDURE EVALUATION
Procedure(s):  LEFT EYE FIRST REFRACTIVE CATARACT REMOVAL WITH LENS IMPLANTATION. MAC    Anesthesia Post Evaluation      Multimodal analgesia: multimodal analgesia not used between 6 hours prior to anesthesia start to PACU discharge  Patient location during evaluation: PACU  Patient participation: complete - patient participated  Level of consciousness: awake and alert  Pain score: 0  Airway patency: patent  Anesthetic complications: no  Cardiovascular status: acceptable  Respiratory status: acceptable  Hydration status: acceptable  Post anesthesia nausea and vomiting:  none  Final Post Anesthesia Temperature Assessment:  Normothermia (36.0-37.5 degrees C)      INITIAL Post-op Vital signs:   Vitals Value Taken Time   /61 09/01/21 0900   Temp 36.3 °C (97.3 °F) 09/01/21 0840   Pulse 49 09/01/21 0904   Resp 19 09/01/21 0904   SpO2 99 % 09/01/21 0904   Vitals shown include unvalidated device data.

## 2021-09-01 NOTE — ANESTHESIA PREPROCEDURE EVALUATION
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  TIA ( dyarthria, on plavix) and psychiatric history ( depression)    Comments: Diabetic neuropathy Cardiovascular    Hypertension              Exercise tolerance: >4 METS  Comments: 05/21 ECHO= EF 50-55%, mild MR, TR, PH, AR    2021 Carotid dopplers: < 50% occlusion   GI/Hepatic/Renal     GERD: well controlled          Comments: Dysphagia  Esophageal stricture; dilated annually Endo/Other    Diabetes: type 2  Hypothyroidism  Arthritis     Other Findings   Comments: cataracts    Rheumatoid Arthritis  Sjogrens syndrome  Systemic Lupus         Physical Exam    Airway  Mallampati: III  TM Distance: 4 - 6 cm    Mouth opening: Normal     Cardiovascular    Rhythm: regular  Rate: normal      Pertinent negatives: No murmur   Dental    Dentition: Full lower dentures and Full upper dentures     Pulmonary  Breath sounds clear to auscultation               Abdominal  GI exam deferred       Other Findings            Anesthetic Plan    ASA: 3  Anesthesia type: MAC          Induction: Intravenous  Anesthetic plan and risks discussed with: Patient      taking Plavix

## 2021-09-01 NOTE — DISCHARGE INSTRUCTIONS
Joseluis Goode D.O[facility] P.[facility]  [facility] 183.  [phone]    Post-Operative Instructions for Cataract Surgery     Remove your eye shield and bandage at 12 noon the same day as surgery and start your eye drops. THROW AWAY THE GAUZE UNDER THE SHIELD.  Place the blue eye shield back on for one week while asleep, even if napping in the recliner. Use the tape included in your bag.  DO NOT RUB YOUR EYE EVER! DO NOT WIPE YOUR EYE! ONLY WIPE ON YOUR CHEEK!                DO NOT WEAR EYE MAKEUP FOR 1 WEEK!  You can shower starting tomorrow.  You may resume your previous diet.  If you use glaucoma drops in the operative eye, continue them as directed.  Common symptoms after surgery include a scratchy feeling, slight headache, red eye, and/or blurred vision. You may use Advil or Tylenol for any discomfort.  Avoid strenuous activities and driving until you see Dr. Fox Rodriguez tomorrow. Please use care when walking, your depth perception may be altered.  Bring your bag with your drops to your follow up appointment. CONTINUE DROPS UNTIL ALL BOTTLES ARE EMPTY! Follow-up appointment tomorrow at Dr. Tamara Lucero office:________9:15am____________    Please call the office at [phone] if you experience severe pain or nausea. The following personal items collected during your admission are returned to you:   Dental Appliance: Dental Appliances: Lowers, Uppers  Vision: Visual Aid: Glasses  Hearing Aid: @FLOW  DISCHARGE SUMMARY from Nurse  (1341:LAST)@  [facility]:    Clothing:    Other Valuables:    Valuables sent to safe:        PATIENT INSTRUCTIONS:    After General Anesthesia or Intravenous Sedation, for 24 hours or while taking prescription Narcotics:        Someone should be with you for the next 24 hours. For your own safety, a responsible adult must drive you home. · Limit your activities  · Recommended activity: Rest today, up with assistance today.  Do not climb stairs or shower unattended for the next 24 hours. · Please start with a soft bland diet and advance as tolerated (no nausea) to regular diet. · If you have a sore throat you should try the following: fluids, warm salt water gargles, or throat lozenges. If it does not improve after several days please follow up with your primary physician. · Do not drive and operate hazardous machinery  · Do not make important personal or business decisions  · Do  not drink alcoholic beverages  · If you have not urinated within 8 hours after discharge, please contact your surgeon on call. Report the following to your surgeon:  · Excessive pain, swelling, redness or odor of or around the surgical area  · Temperature over 100.5  · Any nausea or vomiting. · You will receive a Post Operative Call from one of the Recovery Room Nurses on the day after your surgery to check on you. It is very important for us to know how you are recovering after your surgery. If you have an issue or need to speak with someone, please call your surgeon, do not wait for the post operative call. · You may receive an e-mail or letter in the mail from [employer] regarding your experience with us in the Ambulatory Surgery Unit. Your feedback is valuable to us and we appreciate your participation in the survey. · If the above instructions are not adequate or you are having problems after your surgery, call the physician at their office number. · We wish you a speedy recovery ? What to do at Home:      *  Please give a list of your current medications to your Primary Care Provider. *  Please update this list whenever your medications are discontinued, doses are      changed, or new medications (including over-the-counter products) are added. *  Please carry medication information at all times in case of emergency situations.               If you have not received your influenza and/or pneumococcal vaccine, please follow up with your primary care physician. The discharge information has been reviewed with the patient and family. The patient and family verbalized understanding. TO PREVENT AN INFECTION      1. [address] YOUR HANDS     To prevent infection, good handwashing is the most important thing you or your caregiver can do.  Wash your hands with soap and water or use the hand  we gave you before you touch any wounds. 2.  USE CLEAN SHEETS     Use freshly cleaned sheets on your bed after surgery.  To keep the surgery site clean, do not allow pets to sleep with you while your wound is still healing. 3. STOP SMOKING    Stop smoking, or at least cut back on smoking     Smoking slows your healing. 4.  CONTROL YOUR BLOOD SUGAR     High blood sugars slow wound healing.  If you are diabetic, control your blood sugar levels before and after your surgery.

## 2021-09-01 NOTE — PERIOP NOTES
Permission received to review discharge instructions and discuss private health information with daughter, Vi. Patient states that daughter will be with them for at least 24 hours following today's procedure.

## 2021-09-02 PROBLEM — H25.812 COMBINED FORMS OF AGE-RELATED CATARACT OF LEFT EYE: Status: RESOLVED | Noted: 2021-05-13 | Resolved: 2021-09-02

## 2021-09-02 PROBLEM — H25.811 COMBINED FORMS OF AGE-RELATED CATARACT OF RIGHT EYE: Status: ACTIVE | Noted: 2021-09-02

## 2021-09-03 NOTE — PERIOP NOTES
[facility]  Ambulatory Surgery Unit  Pre-operative Instructions    Surgery/Procedure Date  Wednesday, September 8, 2021            Tentative Arrival Time TBD      1. On the day of your surgery/procedure, please report to the Ambulatory Surgery Unit Registration Desk and sign in at your designated time. The Ambulatory Surgery Unit is located in [facility] on the [facility] side of the [facility] across from the [address] [place] [address]. Please have all of your health insurance cards and a photo ID. 2. You must have someone with you to drive you home, as you should not drive a car for 24 hours following anesthesia. Please make arrangements for a responsible adult friend or family member to stay with you for at least the first 24 hours after your surgery. 3. Do not have anything to eat or drink (including water, gum, mints, coffee, juice) after 11:59 PM, Tuesday. This may not apply to medications prescribed by your physician. (Please note below the special instructions with medications to take the morning of surgery, if applicable.)    4. We recommend you do not drink any alcoholic beverages for 24 hours before and after your surgery. 5. Contact your surgeons office for instructions on the following medications: non-steroidal anti-inflammatory drugs (i.e. Advil, Aleve), vitamins, and supplements. (Some surgeons will want you to stop these medications prior to surgery and others may allow you to take them)   **If you are currently taking Plavix, Coumadin, Aspirin and/or other blood-thinning agents, contact your surgeon for instructions. ** Your surgeon will partner with the physician prescribing these medications to determine if it is safe to stop or if you need to continue taking. Please do not stop taking these medications without instructions from your surgeon.     6. In an effort to help prevent surgical site infection, we ask that you shower with an anti-bacterial soap (i.e. Dial/Safeguard, or the soap provided to you at your preadmission testing appointment) for 3 days prior to and on the morning of surgery, using a fresh towel after each shower. (Please begin this process with fresh bed linens.) Do not apply any lotions, powders, or deodorants after the shower on the day of your procedure. If applicable, please do not shave the operative site for 48 hours prior to surgery. 7. Wear comfortable clothes. Wear glasses instead of contacts. Do not bring any jewelry or money (other than copays or fees as instructed). Do not wear make-up, particularly mascara, the morning of your surgery. Do not wear nail polish, particularly if you are having foot /hand surgery. Wear your hair loose or down, no ponytails, buns, abdi pins or clips. All body piercings must be removed. 8. You should understand that if you do not follow these instructions your surgery may be cancelled. If your physical condition changes (i.e. fever, cold or flu) please contact your surgeon as soon as possible. 9. It is important that you be on time. If a situation occurs where you may be late, or if you have any questions or problems, please call [phone].    10. Your surgery time may be subject to change. You will receive a phone call the day prior to surgery to confirm your arrival time. 11. Pediatric patients: please bring a change of clothes, diapers, bottle/sippy cup, pacifier, etc.      Special Instructions: Take all medications and inhalers, as prescribed, on the morning of surgery with a sip of water. I understand a pre-operative phone call will be made to verify my surgery time. In the event that I am not available, I give permission for a message to be left on my answering service and/or with another person?       yes    Preop instructions reviewed  Pt verbalized understanding.      ___________________      ___________________      ________________  (Signature of Patient)          (Witness) (Date and Time)

## 2021-09-07 ENCOUNTER — ANESTHESIA EVENT (OUTPATIENT)
Dept: SURGERY | Age: 86
End: 2021-09-07
Payer: MEDICARE

## 2021-09-08 ENCOUNTER — HOSPITAL ENCOUNTER (OUTPATIENT)
Age: 86
Setting detail: OUTPATIENT SURGERY
Discharge: HOME | End: 2021-09-08
Attending: OPHTHALMOLOGY | Admitting: OPHTHALMOLOGY
Payer: MEDICARE

## 2021-09-08 ENCOUNTER — ANESTHESIA (OUTPATIENT)
Dept: SURGERY | Age: 86
End: 2021-09-08
Payer: MEDICARE

## 2021-09-08 VITALS
OXYGEN SATURATION: 99 % | WEIGHT: 107 LBS | HEART RATE: 65 BPM | SYSTOLIC BLOOD PRESSURE: 121 MMHG | RESPIRATION RATE: 26 BRPM | DIASTOLIC BLOOD PRESSURE: 67 MMHG | TEMPERATURE: 97.7 F | BODY MASS INDEX: 18.27 KG/M2 | HEIGHT: 64 IN

## 2021-09-08 PROCEDURE — 74011250636 HC RX REV CODE- 250/636: Performed by: OPHTHALMOLOGY

## 2021-09-08 PROCEDURE — 77030021352 HC CBL LD SYS DISP COVD -B: Performed by: OPHTHALMOLOGY

## 2021-09-08 PROCEDURE — 74011250636 HC RX REV CODE- 250/636: Performed by: NURSE ANESTHETIST, CERTIFIED REGISTERED

## 2021-09-08 PROCEDURE — 76210000050 HC AMBSU PH II REC 0.5 TO 1 HR: Performed by: OPHTHALMOLOGY

## 2021-09-08 PROCEDURE — 76060000073 HC AMB SURG ANES FIRST 0.5 HR: Performed by: OPHTHALMOLOGY

## 2021-09-08 PROCEDURE — V2632 POST CHMBR INTRAOCULAR LENS: HCPCS | Performed by: OPHTHALMOLOGY

## 2021-09-08 PROCEDURE — 74011250637 HC RX REV CODE- 250/637: Performed by: OPHTHALMOLOGY

## 2021-09-08 PROCEDURE — 2709999900 HC NON-CHARGEABLE SUPPLY: Performed by: OPHTHALMOLOGY

## 2021-09-08 PROCEDURE — 74011000250 HC RX REV CODE- 250

## 2021-09-08 PROCEDURE — 74011000250 HC RX REV CODE- 250: Performed by: OPHTHALMOLOGY

## 2021-09-08 PROCEDURE — 76030000002 HC AMB SURG OR TIME FIRST 0.: Performed by: OPHTHALMOLOGY

## 2021-09-08 DEVICE — LENS IOL POST 1-PC 6X13 19.0. -- ACRYSOF: Type: IMPLANTABLE DEVICE | Site: EYE | Status: FUNCTIONAL

## 2021-09-08 RX ORDER — LIDOCAINE HYDROCHLORIDE 40 MG/ML
3 INJECTION, SOLUTION RETROBULBAR; TOPICAL ONCE
Status: DISCONTINUED | OUTPATIENT
Start: 2021-09-08 | End: 2021-09-08 | Stop reason: HOSPADM

## 2021-09-08 RX ORDER — PROPARACAINE HYDROCHLORIDE 5 MG/ML
1 SOLUTION/ DROPS OPHTHALMIC ONCE
Status: COMPLETED | OUTPATIENT
Start: 2021-09-08 | End: 2021-09-08

## 2021-09-08 RX ORDER — MIDAZOLAM HYDROCHLORIDE 1 MG/ML
INJECTION, SOLUTION INTRAMUSCULAR; INTRAVENOUS AS NEEDED
Status: DISCONTINUED | OUTPATIENT
Start: 2021-09-08 | End: 2021-09-08 | Stop reason: HOSPADM

## 2021-09-08 RX ORDER — FENTANYL CITRATE 50 UG/ML
25 INJECTION, SOLUTION INTRAMUSCULAR; INTRAVENOUS
Status: DISCONTINUED | OUTPATIENT
Start: 2021-09-08 | End: 2021-09-08 | Stop reason: HOSPADM

## 2021-09-08 RX ORDER — PREDNISOLONE ACETATE 10 MG/ML
1 SUSPENSION/ DROPS OPHTHALMIC 2 TIMES DAILY
Status: DISCONTINUED | OUTPATIENT
Start: 2021-09-08 | End: 2021-09-08 | Stop reason: HOSPADM

## 2021-09-08 RX ORDER — DICLOFENAC SODIUM 1 MG/ML
SOLUTION/ DROPS OPHTHALMIC
Status: COMPLETED
Start: 2021-09-08 | End: 2021-09-08

## 2021-09-08 RX ORDER — SODIUM CHLORIDE 0.9 % (FLUSH) 0.9 %
5-40 SYRINGE (ML) INJECTION EVERY 8 HOURS
Status: DISCONTINUED | OUTPATIENT
Start: 2021-09-08 | End: 2021-09-08 | Stop reason: HOSPADM

## 2021-09-08 RX ORDER — OXYCODONE AND ACETAMINOPHEN 5; 325 MG/1; MG/1
1 TABLET ORAL
Status: DISCONTINUED | OUTPATIENT
Start: 2021-09-08 | End: 2021-09-08 | Stop reason: HOSPADM

## 2021-09-08 RX ORDER — TROPICAMIDE 10 MG/ML
1 SOLUTION/ DROPS OPHTHALMIC ONCE
Status: COMPLETED | OUTPATIENT
Start: 2021-09-08 | End: 2021-09-08

## 2021-09-08 RX ORDER — SODIUM CHLORIDE 0.9 % (FLUSH) 0.9 %
5-40 SYRINGE (ML) INJECTION AS NEEDED
Status: DISCONTINUED | OUTPATIENT
Start: 2021-09-08 | End: 2021-09-08 | Stop reason: HOSPADM

## 2021-09-08 RX ORDER — HYDROMORPHONE HYDROCHLORIDE 1 MG/ML
.2-.5 INJECTION, SOLUTION INTRAMUSCULAR; INTRAVENOUS; SUBCUTANEOUS ONCE
Status: DISCONTINUED | OUTPATIENT
Start: 2021-09-08 | End: 2021-09-08 | Stop reason: HOSPADM

## 2021-09-08 RX ORDER — SODIUM CHLORIDE, SODIUM LACTATE, POTASSIUM CHLORIDE, CALCIUM CHLORIDE 600; 310; 30; 20 MG/100ML; MG/100ML; MG/100ML; MG/100ML
25 INJECTION, SOLUTION INTRAVENOUS CONTINUOUS
Status: DISCONTINUED | OUTPATIENT
Start: 2021-09-08 | End: 2021-09-08 | Stop reason: HOSPADM

## 2021-09-08 RX ORDER — DIPHENHYDRAMINE HYDROCHLORIDE 50 MG/ML
12.5 INJECTION, SOLUTION INTRAMUSCULAR; INTRAVENOUS AS NEEDED
Status: DISCONTINUED | OUTPATIENT
Start: 2021-09-08 | End: 2021-09-08 | Stop reason: HOSPADM

## 2021-09-08 RX ORDER — DICLOFENAC SODIUM 1 MG/ML
1 SOLUTION/ DROPS OPHTHALMIC ONCE
Status: COMPLETED | OUTPATIENT
Start: 2021-09-08 | End: 2021-09-08

## 2021-09-08 RX ORDER — LIDOCAINE HYDROCHLORIDE 10 MG/ML
0.1 INJECTION, SOLUTION EPIDURAL; INFILTRATION; INTRACAUDAL; PERINEURAL AS NEEDED
Status: DISCONTINUED | OUTPATIENT
Start: 2021-09-08 | End: 2021-09-08 | Stop reason: HOSPADM

## 2021-09-08 RX ORDER — TROPICAMIDE 10 MG/ML
SOLUTION/ DROPS OPHTHALMIC
Status: COMPLETED
Start: 2021-09-08 | End: 2021-09-08

## 2021-09-08 RX ORDER — TETRACAINE HYDROCHLORIDE 5 MG/ML
1 SOLUTION OPHTHALMIC
Status: ACTIVE | OUTPATIENT
Start: 2021-09-08 | End: 2021-09-08

## 2021-09-08 RX ORDER — SODIUM CHLORIDE 9 MG/ML
25 INJECTION, SOLUTION INTRAVENOUS CONTINUOUS
Status: DISCONTINUED | OUTPATIENT
Start: 2021-09-08 | End: 2021-09-08 | Stop reason: HOSPADM

## 2021-09-08 RX ORDER — ERYTHROMYCIN 5 MG/G
OINTMENT OPHTHALMIC ONCE
Status: COMPLETED | OUTPATIENT
Start: 2021-09-08 | End: 2021-09-08

## 2021-09-08 RX ADMIN — TROPICAMIDE 1 DROP: 10 SOLUTION/ DROPS OPHTHALMIC at 07:38

## 2021-09-08 RX ADMIN — SODIUM CHLORIDE 25 ML/HR: 9 INJECTION, SOLUTION INTRAVENOUS at 07:38

## 2021-09-08 RX ADMIN — DICLOFENAC SODIUM 1 DROP: 1 SOLUTION/ DROPS OPHTHALMIC at 07:38

## 2021-09-08 RX ADMIN — MIDAZOLAM HYDROCHLORIDE 0.5 MG: 1 INJECTION, SOLUTION INTRAMUSCULAR; INTRAVENOUS at 08:55

## 2021-09-08 RX ADMIN — PROPARACAINE HYDROCHLORIDE 1 DROP: 5 SOLUTION/ DROPS OPHTHALMIC at 07:38

## 2021-09-08 NOTE — ANESTHESIA PREPROCEDURE EVALUATION
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  TIA ( dyarthria, on plavix) and psychiatric history ( depression)    Comments: Diabetic neuropathy Cardiovascular    Hypertension              Exercise tolerance: >4 METS  Comments: 05/21 ECHO= EF 50-55%, mild MR, TR, PH, AR    2021 Carotid dopplers: < 50% occlusion   GI/Hepatic/Renal     GERD: well controlled          Comments: Dysphagia  Esophageal stricture; dilated annually Endo/Other    Diabetes: type 2  Hypothyroidism  Arthritis     Other Findings   Comments: cataracts    Rheumatoid Arthritis  Sjogrens syndrome  Systemic Lupus           Physical Exam    Airway  Mallampati: III  TM Distance: 4 - 6 cm  Neck ROM: decreased range of motion   Mouth opening: Normal     Cardiovascular    Rhythm: regular  Rate: normal         Dental    Dentition: Full lower dentures and Full upper dentures     Pulmonary  Breath sounds clear to auscultation               Abdominal  GI exam deferred       Other Findings            Anesthetic Plan    ASA: 3  Anesthesia type: MAC          Induction: Intravenous  Anesthetic plan and risks discussed with: Patient

## 2021-09-08 NOTE — PERIOP NOTES
Caro Pospisil  1935  [id number]    Situation:  Verbal report given from: L. [address] JOSH Voss RN  Procedure: Procedure(s):  RIGHT EYE SECOND REFRACTIVE CATARACT REMOVAL WITH LENS IMPLANTATION    Background:    Preoperative diagnosis: Combined forms of age-related cataract of right eye [H25.811]    Postoperative diagnosis: Combined forms of age-related cataract of right eye [H25.811]    :  Dr. José Acosta    Assistant(s): Circ-1: Elinor Lechuga RN  Circ-2: Meir Eckert RN  Scrub Tech-1: Karen Stanford    Specimens: * No specimens in log *    Assessment:  Intra-procedure medications         Anesthesia gave intra-procedure sedation and medications, see anesthesia flow sheet     Intravenous fluids: LR@ KVO     Vital signs stable       Recommendation:

## 2021-09-08 NOTE — OP NOTES
Name: Lisa Cano MASC-4        updated 3/1/2013  Description:  Stacy Foster with intraocular lens implant    PREOPERATIVE DIAGNOSIS: Visually impairing combined cataract, Right eye. POSTOPERATIVE DIAGNOSIS: Visually impairing combined   cataract,Right eye. OPERATION: Procedure(s):  RIGHT EYE SECOND REFRACTIVE CATARACT REMOVAL WITH LENS IMPLANTATION    ANESTHESIA: Topical with intravenous sedation    TYPE OF LENS IMPLANT USED:   Implant Name Type Inv. Item Serial No.  Lot No. LRB No. Used Action   LENS IOL POST 1-PC 6X13 19.0. -- [employer] - [id number]  LENS IOL POST 1-PC 6X13 19.0. -- [employer] [id number] [employer] N/A Right 1 Implanted       PHACO TIME:   53.6 seconds at an average power of 16.1%. Estimated blood loss: None    Complications:None    Specimen removed: None    DESCRIPTION OF PROCEDURE:  The patient was brought to the holding area where an IV was begun at a  keep open rate. The patient was connected to cardiovascular monitoring. The patient received 1 drop of mydriacyl 1% and proparacaine 0.5%. The patient was then brought back to the operating suite and cardiovascular monitoring was reestablished. The patient was  prepped and draped in the usual manner for ocular surgery. The patient received 1 drop of Proparacaine followed by 2 drops of 5% Betadine solution in the inferior conjunctival cul-de-sac The operating microscope was brought into position and 4% Xylocaine drops were instilled onto the eye. The lid speculum was set into position. A paracentesis was created and Sugarcane solution was instilled into the anterior chamber for adequate anesthesia and pupillary dilation. Viscoelastic was instilled into the anterior chamber. The 2.4 mm keratome was then utilized to create a clear corneal incision, and a circular capsulorrhexis was performed. BSS was utilized to perform careful hydrodissection of the lens.  Viscoelastic was then instilled into the anterior chamber to protect the corneal endothelium. Phacoemulsification was then carried out. Any remaining cortical material was removed in irrigation/aspiration mode. Viscoelastic was then instilled into the capsular bag. The lens implant was inspected for any defects. After finding none, the lens was placed in its injector and inserted into the capsular bag. The lens implant was centered on the patient's visual/astigmatic axis. The viscoelastic was then removed from the eye. Miochol was instilled posterior to the iris plane to facilitate pupillary miosis. The wound was checked for any leaks, after finding none, Iopidine and Pred Forte drops were instilled into the inferior cul-de-sac, and erythromycin ointment was placed over the cornea. A semi-pressure dressing and shield were then placed for the patient. The patient tolerated the procedure very well, and was brought to the recovery room in an alert and stable and satisfactory postoperative condition. Instructions were given to the patient and their family for their immediate postoperative care.   [address] [place] [address]  9/8/2021

## 2021-09-08 NOTE — PERIOP NOTES
Pt very sleepy, responding to verbal stimulation. Denies any pain. 0938-Pt waking up more now, tolerating liquids. 0940-D/c instructions reviewed with pt's daughter, all questions answered. Reviewed when to call the doctor, diet, activity and hygiene. Reviewed when to start the eye drops    0958-Transported via w/c to awaiting transportation.

## 2021-09-08 NOTE — ANESTHESIA POSTPROCEDURE EVALUATION
Procedure(s):  RIGHT EYE SECOND REFRACTIVE CATARACT REMOVAL WITH LENS IMPLANTATION. MAC    Anesthesia Post Evaluation      Multimodal analgesia: multimodal analgesia used between 6 hours prior to anesthesia start to PACU discharge  Patient location during evaluation: PACU  Level of consciousness: sleepy but conscious  Pain management: adequate  Airway patency: patent  Anesthetic complications: no  Cardiovascular status: acceptable  Respiratory status: acceptable  Hydration status: acceptable  Comments: +Post-Anesthesia Evaluation and Assessment    Patient: Wayne Potter MRN: [id number]  SSN: [id number]   YOB: 1935  Age: 80 y.o. Sex: female      Cardiovascular Function/Vital Signs    /63   Pulse 63   Temp 36.7 °C (98.1 °F)   Resp 23   Ht 5' 4\" (1.626 m)   Wt 48.5 kg (107 lb)   SpO2 97%   BMI 18.37 kg/m²     Patient is status post Procedure(s):  RIGHT EYE SECOND REFRACTIVE CATARACT REMOVAL WITH LENS IMPLANTATION. Nausea/Vomiting: Controlled. Postoperative hydration reviewed and adequate. Pain:  Pain Scale 1: FLACC (09/08/21 0928)  Pain Intensity 1: 0 (09/08/21 0736)   Managed. Neurological Status:   Neuro (WDL): Exceptions to WDL (09/08/21 0928)   At baseline. Mental Status and Level of Consciousness: Arousable. Pulmonary Status:   O2 Device: None (Room air) (09/08/21 0923)   Adequate oxygenation and airway patent. Complications related to anesthesia: None    Post-anesthesia assessment completed. No concerns. Signed By: Ady Patino MD    9/8/2021  Post anesthesia nausea and vomiting:  controlled  Final Post Anesthesia Temperature Assessment:  Normothermia (36.0-37.5 degrees C)      INITIAL Post-op Vital signs:   Vitals Value Taken Time   /67 09/08/21 0930   Temp 36.7 °C (98.1 °F) 09/08/21 0921   Pulse 61 09/08/21 0933   Resp 20 09/08/21 0933   SpO2 98 % 09/08/21 0933   Vitals shown include unvalidated device data.

## 2021-09-08 NOTE — DISCHARGE INSTRUCTIONS
Ester Boucher D.O., PStarCStar  [facility] 183.  [phone]    Post-Operative Instructions for Cataract Surgery     Remove your eye shield and bandage at 12 noon the same day as surgery and start your eye drops. THROW AWAY THE GAUZE UNDER THE SHIELD.  Place the blue eye shield back on for one week while asleep, even if napping in the recliner. Use the tape included in your bag.  DO NOT RUB YOUR EYE EVER! DO NOT WIPE YOUR EYE! ONLY WIPE ON YOUR CHEEK!                DO NOT WEAR EYE MAKEUP FOR 1 WEEK!  You can shower starting tomorrow.  You may resume your previous diet.  If you use glaucoma drops in the operative eye, continue them as directed.  Common symptoms after surgery include a scratchy feeling, slight headache, red eye, and/or blurred vision. You may use Advil or Tylenol for any discomfort.  Avoid strenuous activities and driving until you see Dr. Randa Matos tomorrow. Please use care when walking, your depth perception may be altered.  Bring your bag with your drops to your follow up appointment. CONTINUE DROPS UNTIL ALL BOTTLES ARE EMPTY! Follow-up appointment tomorrow at Dr. Devyn Fuentse office:________9:15 am____________    Please call the office at [phone] if you experience severe pain or nausea. The following personal items collected during your admission are returned to you:   Dental Appliance: Dental Appliances: Uppers, Lowers, With patient  Vision: Visual Aid: Glasses (Glasses in PACU)  Hearing Aid: @FLOW  DISCHARGE SUMMARY from Nurse  (1341:LAST)@  Jewelry: Jewelry: None  Clothing: Clothing: With patient  Other Valuables: Other Valuables: Cane (cane and sweater underneath stretcher, glasses in PACU)  Valuables sent to safe:        PATIENT INSTRUCTIONS:    After General Anesthesia or Intravenous Sedation, for 24 hours or while taking prescription Narcotics:        Someone should be with you for the next 24 hours.         For your own safety, a responsible adult must drive you home. · Limit your activities  · Recommended activity: Rest today, up with assistance today. Do not climb stairs or shower unattended for the next 24 hours. · Please start with a soft bland diet and advance as tolerated (no nausea) to regular diet. · If you have a sore throat you should try the following: fluids, warm salt water gargles, or throat lozenges. If it does not improve after several days please follow up with your primary physician. · Do not drive and operate hazardous machinery  · Do not make important personal or business decisions  · Do  not drink alcoholic beverages  · If you have not urinated within 8 hours after discharge, please contact your surgeon on call. Report the following to your surgeon:  · Excessive pain, swelling, redness or odor of or around the surgical area  · Temperature over 100.5  · Any nausea or vomiting. · You will receive a Post Operative Call from one of the Recovery Room Nurses on the day after your surgery to check on you. It is very important for us to know how you are recovering after your surgery. If you have an issue or need to speak with someone, please call your surgeon, do not wait for the post operative call. · You may receive an e-mail or letter in the mail from [employer] regarding your experience with us in the Ambulatory Surgery Unit. Your feedback is valuable to us and we appreciate your participation in the survey. · If the above instructions are not adequate or you are having problems after your surgery, call the physician at their office number. · We wish you a speedy recovery ? What to do at Home:      *  Please give a list of your current medications to your Primary Care Provider. *  Please update this list whenever your medications are discontinued, doses are      changed, or new medications (including over-the-counter products) are added.     *  Please carry medication information at all times in case of emergency situations. If you have not received your influenza and/or pneumococcal vaccine, please follow up with your primary care physician. The discharge information has been reviewed with the patient and family. The patient and family verbalized understanding. TO PREVENT AN INFECTION      1. [address] YOUR HANDS     To prevent infection, good handwashing is the most important thing you or your caregiver can do.  Wash your hands with soap and water or use the hand  we gave you before you touch any wounds. 2.  USE CLEAN SHEETS     Use freshly cleaned sheets on your bed after surgery.  To keep the surgery site clean, do not allow pets to sleep with you while your wound is still healing. 3. STOP SMOKING    Stop smoking, or at least cut back on smoking     Smoking slows your healing. 4.  CONTROL YOUR BLOOD SUGAR     High blood sugars slow wound healing.  If you are diabetic, control your blood sugar levels before and after your surgery.

## 2022-01-01 NOTE — PERIOP NOTES
Caro Pospisil  1935  [id number]    Situation:  Verbal report given from: XIN Haines RN and Luis Alfredo Samano CRNA  Procedure: Procedure(s):  TELESCOPIC DIRECT LARYNGOSCOPY, ESOPHAGOSCOPY, BOTOX INJECTION TO CRICOPHARYNGEAL MUSCLE    Background:    Preoperative diagnosis: DYSPHAGIA    Postoperative diagnosis: DYSPHAGIA    :  Dr. Amalia Klein    Assistant(s): Circ-1: Janeth Watson RN  Scrub Tech-1: Douglas Saleh    Specimens: * No specimens in log *    Assessment:  Intra-procedure medications         Anesthesia gave intra-procedure sedation and medications, see anesthesia flow sheet     Intravenous fluids: LR@ KVO     Vital signs stable. Pt breathing well on own. Unresponsive.       Recommendation:    Permission to share finding with family or friend yes Discharge instructions completed with patient's mother. No questions or concerns at this time.

## 2022-03-18 PROBLEM — R47.1 DYSARTHRIA: Status: ACTIVE | Noted: 2021-05-13

## 2022-03-18 PROBLEM — I63.9 STROKE (CEREBRUM) (HCC): Status: ACTIVE | Noted: 2021-05-13

## 2022-03-19 PROBLEM — H25.811 COMBINED FORMS OF AGE-RELATED CATARACT OF RIGHT EYE: Status: ACTIVE | Noted: 2021-09-02

## 2022-03-19 PROBLEM — Z86.73 HISTORY OF STROKE: Status: ACTIVE | Noted: 2019-08-27

## 2022-07-21 ENCOUNTER — OFFICE VISIT (OUTPATIENT)
Dept: NEUROLOGY | Age: 87
End: 2022-07-21
Payer: MEDICARE

## 2022-07-21 VITALS
BODY MASS INDEX: 18.78 KG/M2 | DIASTOLIC BLOOD PRESSURE: 80 MMHG | SYSTOLIC BLOOD PRESSURE: 112 MMHG | HEIGHT: 64 IN | OXYGEN SATURATION: 98 % | RESPIRATION RATE: 18 BRPM | WEIGHT: 110 LBS | TEMPERATURE: 97.6 F | HEART RATE: 68 BPM

## 2022-07-21 DIAGNOSIS — I69.322 DYSARTHRIA DUE TO RECENT STROKE: ICD-10-CM

## 2022-07-21 DIAGNOSIS — I67.89 CEREBRAL MICROVASCULAR DISEASE: Primary | ICD-10-CM

## 2022-07-21 DIAGNOSIS — I65.23 STENOSIS OF BOTH CAROTID ARTERIES WITHOUT CEREBRAL INFARCTION: ICD-10-CM

## 2022-07-21 PROCEDURE — 99213 OFFICE O/P EST LOW 20 MIN: CPT | Performed by: PSYCHIATRY & NEUROLOGY

## 2022-07-21 PROCEDURE — 1123F ACP DISCUSS/DSCN MKR DOCD: CPT | Performed by: PSYCHIATRY & NEUROLOGY

## 2022-07-21 PROCEDURE — 1090F PRES/ABSN URINE INCON ASSESS: CPT | Performed by: PSYCHIATRY & NEUROLOGY

## 2022-07-21 PROCEDURE — G8536 NO DOC ELDER MAL SCRN: HCPCS | Performed by: PSYCHIATRY & NEUROLOGY

## 2022-07-21 PROCEDURE — G8427 DOCREV CUR MEDS BY ELIG CLIN: HCPCS | Performed by: PSYCHIATRY & NEUROLOGY

## 2022-07-21 PROCEDURE — G8510 SCR DEP NEG, NO PLAN REQD: HCPCS | Performed by: PSYCHIATRY & NEUROLOGY

## 2022-07-21 PROCEDURE — G8420 CALC BMI NORM PARAMETERS: HCPCS | Performed by: PSYCHIATRY & NEUROLOGY

## 2022-07-21 PROCEDURE — 1101F PT FALLS ASSESS-DOCD LE1/YR: CPT | Performed by: PSYCHIATRY & NEUROLOGY

## 2022-07-21 RX ORDER — EZETIMIBE 10 MG/1
TABLET ORAL
COMMUNITY
Start: 2022-06-06

## 2022-07-21 NOTE — LETTER
7/21/2022    Patient: Beth Tran   YOB: 1935   Date of Visit: 7/21/2022     Misha Cook MD  Formerly [facility] [id number]  Via Fax: [phone]    Dear Misha Cook MD,      Thank you for referring Ms. Caro Lawson to [address] [place] [address] for evaluation. My notes for this consultation are attached. Chief Complaint   Patient presents with    Follow-up     S/p Tia-patient thought she was depressed but now feels it was just the isolation from Covid      1. Have you been to the ER, urgent care clinic since your last visit? Hospitalized since your last visit? No     2. Have you seen or consulted any other health care providers outside of the [address] [place] [address] since your last visit? Include any pap smears or colon screening. Yes pcp      Consult    Subjective:     Caro Lawson is a 80 y.o. right-handed  female seen for evaluation at the request of Dr. Lance Castro of problem of patient having admission to the hospital for TIA that occurred 3 years ago when the patient was talking on the phone to her daughter and suddenly could not get her words out, and the spell lasted about 5 to 15 minutes by description in the hospital, and the patient had a CTA of the head neck that just showed mild stenosis of the carotid arteries, no intracranial disease, but she did have microvascular disease but no acute stroke on MRI scan and was diagnosed with a TIA. She has done well and had no further episodes and takes Plavix as antiplatelet therapy. Patient in the past was seen for difficulty swallowing, when we saw her 2 year ago, but she has had her esophagus dilated and treated with Botox and she can swallow solid foods, and no longer on liquid diet and maintaining her weight and does not think she has any new problem but needs serial GI evaluations to make sure she does not need further dilatation. Her voice remains stable and less hoarse. . She has been dilated 3 times and had Botox several times, with only transient improvement in her swallowing ability. She had a negative neurologic workup for neuromuscular disease as a cause of her symptoms, but treating her esophageal stricture does not seem to be helping all that much. On exam again she does not have any clear atrophy or fasciculations of her tongue to suggest motor neuron disease and no systemic weakness, and no fasciculations or hyperreflexia. She had no other bulbar weakness. She had normal CPK enzymes also in the past and myasthenia test negative. Her carotid Doppler study done last year was less than 50% disease is stable. Her stroke was a spell in 2016 of her right arm suddenly became hard to control and had some abnormal dyskinetic movements lasting several minutes, associated with a sensation of weakness and clumsiness. Patient's Dopplers were on remarkable, and she continues Plavix 75 mg once a day without recurrent symptoms. She has had a previous stroke and has diffuse microvascular disease on her MRI scan done one year ago. Her EEG to rule out seizures in view of her past history of strokes was also normal. She denies any progression of weakness, denies any fasciculation, denies any muscle atrophy, and denies any other difficulty with speaking. She has had no symptoms of ALS. Patient had a normal EMG showing no clear evidence of ALS. Patient's MRI scan just showed age related changes and microvascular disease typical for her age and no other lesions. Patient's carotid Dopplers were normal. All lab tests including myasthenic panel, CPK, and all other tests were negative except for positive test for anti-neuronal antibody which may be a false positive from her Sjogrens syndrome and known lupus. She denies any weakness or sensory loss in her extremities, but does complaint of mild fatigability and generalized weakness.  She has no double vision, headaches, sleep problems, gait problems, fever, trauma, or other precipitating causes. She has no family history of similar problems. She had a history of stroke in 1998 that was manifested by speech difficulty and visual loss which resolved after several years without recurrent symptoms. She has a history of Sjogrens syndrome and had her teeth removed because of complications from that. She has occasional choking, and probably had aspiration pneumonia at [facility] last february, but was never seen by neurology. Her bowel and bladder function is stable, and occasionally her knees feel wobbly. She has no major back pain, headache, or neck pain. Her voice has become softer. Patient denies muscle pain, atrophy or fasciculations or cramps.     Past Medical History:   Diagnosis Date    Arthritis     Arthropathy     Chronic pain     lupus    Depression     Essential hypertension     Fracture     back    GERD (gastroesophageal reflux disease)     High cholesterol     [place] (hard of hearing)     requires hearing aids    Ill-defined condition 04/2017    pneumonia    Lupus ([address]     Osteoporosis     Pneumonia     Refusal of blood transfusions as patient is [employer]     Rheumatoid arthritis, unspecified ([address]     Sjoegren syndrome     Stroke ([facility] 1998    no residual weakness    TIA (transient ischemic attack) 06/2021    per pt, left leg walking still off      Past Surgical History:   Procedure Laterality Date    HX BILATERAL SALPINGO-OOPHORECTOMY  2013    Dr. Allyssa Jimenez Left 08/2021    HX ENDOSCOPY      HX HEENT  10/2018    throat surgery    HX KYPHOPLASTY  02/2016    Pt states T12 and L1     Family History   Problem Relation Age of Onset    Other Mother         NPH    Stroke Mother     Osteoporosis Mother     Stroke Father     Bipolar Disorder Father     Heart Disease Father     Bipolar Disorder Child       Social History     Tobacco Use    Smoking status: Never    Smokeless tobacco: Never Substance Use Topics    Alcohol use: Yes     Alcohol/week: 0.8 standard drinks     Types: 1 Glasses of wine per week     Comment: maybe twice a month         Current Outpatient Medications:     ezetimibe (ZETIA) 10 mg tablet, , Disp: , Rfl:     rosuvastatin (CRESTOR) 20 mg tablet, Take 20 mg by mouth nightly., Disp: , Rfl:     esomeprazole (NEXIUM) 40 mg capsule, Take 40 mg by mouth daily. , Disp: , Rfl:     spironolactone-hydrochlorothiazide (ALDACTAZIDE) 25-25 mg per tablet, Take 1 Tab by mouth daily (after lunch). , Disp: 30 Tab, Rfl: 0    acetaminophen (TYLENOL) 500 mg tablet, Take 500 mg by mouth every six (6) hours as needed for Pain., Disp: , Rfl:     peg 400-propylene glycol (Systane, propylene glycoL,) 0.4-0.3 % drop, Administer 1 Drop to both eyes two (2) times a day., Disp: , Rfl:     cetirizine (ZYRTEC) 5 mg tablet, Take 5 mg by mouth daily. , Disp: , Rfl:     vitamins  A,C,E-zinc-copper (Ocuvite PreserVision) 7,160 unit- 113 mg-100 unit tab tablet, Take 2 Tabs by mouth daily. , Disp: , Rfl:     baclofen (LIORESAL) 10 mg tablet, Take  by mouth as needed. , Disp: , Rfl:     diclofenac (VOLTAREN) 1 % topical gel, Apply 4 g to affected area as needed. To Knee and left thumb, Disp: , Rfl:     citalopram (CELEXA) 20 mg tablet, Take 20 mg by mouth daily (after lunch). , Disp: , Rfl:     clopidogrel (PLAVIX) 75 mg tablet, Take 75 mg by mouth daily (after lunch). , Disp: , Rfl:         Allergies   Allergen Reactions    Imuran [Azathioprine] Other (comments)     Lupus attack    Iodinated Contrast Media Hives    Gadavist [Gadobutrol] Hives and Itching     Scant hives to back post receiving injection of gadavist. Denied any breathing problems or throat swelling. Resolved within 30 minutes.      MRI contrast Gadolinnium)        Review of Systems:  A comprehensive review of systems was negative except for: Constitutional: positive for fatigue and malaise  Respiratory: positive for pneumonia or dyspnea on exertion  Gastrointestinal: positive for dysphagia, dyspepsia and reflux symptoms  Neurological: positive for weakness and dysphasia and dysarthria  Behvioral/Psych: positive for anxiety and depression   Vitals:    07/21/22 1359   BP: 112/80   Pulse: 68   Resp: 18   Temp: 97.6 °F (36.4 °C)   TempSrc: Oral   SpO2: 98%   Weight: 110 lb (49.9 kg)   Height: 5' 4\" (1.626 m)     Objective:     I      NEUROLOGICAL EXAM:    Appearance: The patient is thinly developed and nourished, provides a coherent history and is in no acute distress. Mental Status: Oriented to time, place and person, and the president, cognitive function is normal, speech minimally dysarthric but no aphasia. Mood and affect appropriate but anxious and depressed. Cranial Nerves:   Intact visual fields. Fundi are benign, discs are poorly seen. SO, EOM's full, no nystagmus, no ptosis. Facial sensation is normal. Corneal reflexes are not tested. Facial movement is symmetric, there may be slight facial weakness noted on forced eyelid closure. [place] [employer] Hearing is abnormal bilaterally. Palate is midline with normal sternocleidomastoid and trapezius muscles are normal. Tongue is midline, with no obvious fasciculations. Neck is supple without meningismus or bruits  Temporal arteries are not tender or enlarged   Motor:  4/5 strength in upper and lower proximal and distal muscles. Normal bulk and tone. No fasciculations. Rapid alternating movement is a little slow bilaterally   Reflexes:   Deep tendon reflexes 1+/4 and symmetrical and slightly brisk. No babinski or clonus present   Sensory:   Normal to touch, pinprick and vibration, and DSS. Gait:  Normal gait for age though she does move a little slowly. Tremor:   No tremor noted. Cerebellar:  No cerebellar signs present on finger-nose-finger exam, Romberg and tandem are mildly abnormal.   Neurovascular:  Normal heart sounds and regular rhythm, peripheral pulses decreased, and no carotid bruits. Assessment:       ICD-10-CM ICD-9-CM    1. Cerebral microvascular disease  I67.89 437.8       2. Stenosis of both carotid arteries without cerebral infarction  I65.23 433.10      433.30       3. Dysarthria due to recent stroke  I69.322 438.13             Plan:     Patient stable over the last year on Plavix therapy, not had any recurrent TIAs, and her Doppler study last year showed less than 50% disease, she is 80years old, doing well, and has no other new neurological problems, no evidence of motor neuron disease, or swallowing is fair and more related to her esophagus, and no evidence of neuromuscular disease. We encouraged the patient to stay mentally and physically active she said she will, and continue her medication, and we will follow her as needed for now, she is doing well for her age. She had a CTA of the head neck that just shows mild stenosis of the carotid bifurcations, we will check a Doppler study to see if there is any change from the previous study done 2 years ago because of dizziness. Patient with difficulty swallowing over the last year, but seems stable over the last year, not really progressing, and maintaining her weight. Patient still has no signs of motor neuron disease or any neuromuscular disease that I can see at this time. She does have mild bifacial weakness that is a little unclear. She has no atrophy or fasciculations of her tongue, no other bulbar weakness, and no systemic weakness or fasciculations to suggest motor neuron disease or neuromuscular junction disease  She has had no more strokelike symptoms, and will get another carotid Doppler next visit since is been 2 year since her last Doppler. She is to continue her Plavix  Patient has had surgery for cricopharyngeal muscle spasms at times and several Botox injections without improvement in her symptoms but only transitory improvement  No evidence of ALS so far.   She is to continue her Plavix therapy and call us if any change in call for results of her tests  Patient with dysphagia and dysarthria and neuromuscular disease of the oral pharynx, without clear fasciculations or atrophy of her muscles or tongue. MRI and EMG and blood tests are all unremarkable except for a questionable anti-neuronal antibody probably related to lupus  We will repeat that next visit. Patient states feels much better, gaining weight, getting stronger all the time and exercising more. She will call if any problem in the interim, and followup will be arranged in 6 months time or earlier if needed  25 minutes spent with patient and her son-in-law in detail exam, checking her carefully for any fasciculations upper extremities or tongue, and discussing her possible diagnosis, prognosis and treatment    Signed By: Helena Dc MD     July 21, 2022                     If you have questions, please do not hesitate to call me. I look forward to following your patient along with you.       Sincerely,    Helena Dc MD

## 2022-07-21 NOTE — PROGRESS NOTES
Chief Complaint   Patient presents with    Follow-up     S/p Tia-patient thought she was depressed but now feels it was just the isolation from Covid      1. Have you been to the ER, urgent care clinic since your last visit? Hospitalized since your last visit? No     2. Have you seen or consulted any other health care providers outside of the [address] [place] [address] since your last visit? Include any pap smears or colon screening.   Yes pcp

## 2022-07-21 NOTE — PROGRESS NOTES
Consult    Subjective:     America Lawson is a 80 y.o. right-handed  female seen for evaluation at the request of Dr. Mat Avina of problem of patient having admission to the hospital for TIA that occurred 3 years ago when the patient was talking on the phone to her daughter and suddenly could not get her words out, and the spell lasted about 5 to 15 minutes by description in the hospital, and the patient had a CTA of the head neck that just showed mild stenosis of the carotid arteries, no intracranial disease, but she did have microvascular disease but no acute stroke on MRI scan and was diagnosed with a TIA. She has done well and had no further episodes and takes Plavix as antiplatelet therapy. Patient in the past was seen for difficulty swallowing, when we saw her 2 year ago, but she has had her esophagus dilated and treated with Botox and she can swallow solid foods, and no longer on liquid diet and maintaining her weight and does not think she has any new problem but needs serial GI evaluations to make sure she does not need further dilatation. Her voice remains stable and less hoarse. . She has been dilated 3 times and had Botox several times, with only transient improvement in her swallowing ability. She had a negative neurologic workup for neuromuscular disease as a cause of her symptoms, but treating her esophageal stricture does not seem to be helping all that much. On exam again she does not have any clear atrophy or fasciculations of her tongue to suggest motor neuron disease and no systemic weakness, and no fasciculations or hyperreflexia. She had no other bulbar weakness. She had normal CPK enzymes also in the past and myasthenia test negative. Her carotid Doppler study done last year was less than 50% disease is stable.   Her stroke was a spell in 2016 of her right arm suddenly became hard to control and had some abnormal dyskinetic movements lasting several minutes, associated with a sensation of weakness and clumsiness. Patient's Dopplers were on remarkable, and she continues Plavix 75 mg once a day without recurrent symptoms. She has had a previous stroke and has diffuse microvascular disease on her MRI scan done one year ago. Her EEG to rule out seizures in view of her past history of strokes was also normal. She denies any progression of weakness, denies any fasciculation, denies any muscle atrophy, and denies any other difficulty with speaking. She has had no symptoms of ALS. Patient had a normal EMG showing no clear evidence of ALS. Patient's MRI scan just showed age related changes and microvascular disease typical for her age and no other lesions. Patient's carotid Dopplers were normal. All lab tests including myasthenic panel, CPK, and all other tests were negative except for positive test for anti-neuronal antibody which may be a false positive from her Sjogrens syndrome and known lupus. She denies any weakness or sensory loss in her extremities, but does complaint of mild fatigability and generalized weakness. She has no double vision, headaches, sleep problems, gait problems, fever, trauma, or other precipitating causes. She has no family history of similar problems. She had a history of stroke in 1998 that was manifested by speech difficulty and visual loss which resolved after several years without recurrent symptoms. She has a history of Sjogrens syndrome and had her teeth removed because of complications from that. She has occasional choking, and probably had aspiration pneumonia at [facility] last february, but was never seen by neurology. Her bowel and bladder function is stable, and occasionally her knees feel wobbly. She has no major back pain, headache, or neck pain. Her voice has become softer. Patient denies muscle pain, atrophy or fasciculations or cramps.     Past Medical History:   Diagnosis Date    Arthritis     Arthropathy     Chronic pain     lupus Depression     Essential hypertension     Fracture     back    GERD (gastroesophageal reflux disease)     High cholesterol     [place] (hard of hearing)     requires hearing aids    Ill-defined condition 04/2017    pneumonia    Lupus ([facility] [address]     Osteoporosis     Pneumonia     Refusal of blood transfusions as patient is Jain     Rheumatoid arthritis, unspecified ([facility] [address]     Sjoegren syndrome     Stroke ([facility] [address] 1998    no residual weakness    TIA (transient ischemic attack) 06/2021    per pt, left leg walking still off      Past Surgical History:   Procedure Laterality Date    HX BILATERAL SALPINGO-OOPHORECTOMY  2013    Dr. Keely Shannon Left 08/2021    HX ENDOSCOPY      HX HEENT  10/2018    throat surgery    HX KYPHOPLASTY  02/2016    Pt states T12 and L1     Family History   Problem Relation Age of Onset    Other Mother         NPH    Stroke Mother     Osteoporosis Mother     Stroke Father     Bipolar Disorder Father     Heart Disease Father     Bipolar Disorder Child       Social History     Tobacco Use    Smoking status: Never    Smokeless tobacco: Never   Substance Use Topics    Alcohol use: Yes     Alcohol/week: 0.8 standard drinks     Types: 1 Glasses of wine per week     Comment: maybe twice a month         Current Outpatient Medications:     ezetimibe (ZETIA) 10 mg tablet, , Disp: , Rfl:     rosuvastatin (CRESTOR) 20 mg tablet, Take 20 mg by mouth nightly., Disp: , Rfl:     esomeprazole (NEXIUM) 40 mg capsule, Take 40 mg by mouth daily. , Disp: , Rfl:     spironolactone-hydrochlorothiazide (ALDACTAZIDE) 25-25 mg per tablet, Take 1 Tab by mouth daily (after lunch). , Disp: 30 Tab, Rfl: 0    acetaminophen (TYLENOL) 500 mg tablet, Take 500 mg by mouth every six (6) hours as needed for Pain., Disp: , Rfl:     peg 400-propylene glycol (Systane, propylene glycoL,) 0.4-0.3 % drop, Administer 1 Drop to both eyes two (2) times a day., Disp: , Rfl:     cetirizine (ZYRTEC) 5 mg tablet, Take 5 mg by mouth daily. , Disp: , Rfl:     vitamins  A,C,E-zinc-copper (Ocuvite PreserVision) 7,160 unit- 113 mg-100 unit tab tablet, Take 2 Tabs by mouth daily. , Disp: , Rfl:     baclofen (LIORESAL) 10 mg tablet, Take  by mouth as needed. , Disp: , Rfl:     diclofenac (VOLTAREN) 1 % topical gel, Apply 4 g to affected area as needed. To Knee and left thumb, Disp: , Rfl:     citalopram (CELEXA) 20 mg tablet, Take 20 mg by mouth daily (after lunch). , Disp: , Rfl:     clopidogrel (PLAVIX) 75 mg tablet, Take 75 mg by mouth daily (after lunch). , Disp: , Rfl:         Allergies   Allergen Reactions    Imuran [Azathioprine] Other (comments)     Lupus attack    Iodinated Contrast Media Hives    Gadavist [Gadobutrol] Hives and Itching     Scant hives to back post receiving injection of gadavist. Denied any breathing problems or throat swelling. Resolved within 30 minutes. MRI contrast Gadolinnium)        Review of Systems:  A comprehensive review of systems was negative except for: Constitutional: positive for fatigue and malaise  Respiratory: positive for pneumonia or dyspnea on exertion  Gastrointestinal: positive for dysphagia, dyspepsia and reflux symptoms  Neurological: positive for weakness and dysphasia and dysarthria  Behvioral/Psych: positive for anxiety and depression   Vitals:    07/21/22 1359   BP: 112/80   Pulse: 68   Resp: 18   Temp: 97.6 °F (36.4 °C)   TempSrc: Oral   SpO2: 98%   Weight: 110 lb (49.9 kg)   Height: 5' 4\" (1.626 m)     Objective:     I      NEUROLOGICAL EXAM:    Appearance: The patient is thinly developed and nourished, provides a coherent history and is in no acute distress. Mental Status: Oriented to time, place and person, and the president, cognitive function is normal, speech minimally dysarthric but no aphasia. Mood and affect appropriate but anxious and depressed. Cranial Nerves:   Intact visual fields. Fundi are benign, discs are poorly seen. SO, EOM's full, no nystagmus, no ptosis.  Facial sensation is normal. Corneal reflexes are not tested. Facial movement is symmetric, there may be slight facial weakness noted on forced eyelid closure. [place] Hearing is abnormal bilaterally. Palate is midline with normal sternocleidomastoid and trapezius muscles are normal. Tongue is midline, with no obvious fasciculations. Neck is supple without meningismus or bruits  Temporal arteries are not tender or enlarged   Motor:  4/5 strength in upper and lower proximal and distal muscles. Normal bulk and tone. No fasciculations. Rapid alternating movement is a little slow bilaterally   Reflexes:   Deep tendon reflexes 1+/4 and symmetrical and slightly brisk. No babinski or clonus present   Sensory:   Normal to touch, pinprick and vibration, and DSS. Gait:  Normal gait for age though she does move a little slowly. Tremor:   No tremor noted. Cerebellar:  No cerebellar signs present on finger-nose-finger exam, Romberg and tandem are mildly abnormal.   Neurovascular:  Normal heart sounds and regular rhythm, peripheral pulses decreased, and no carotid bruits. Assessment:       ICD-10-CM ICD-9-CM    1. Cerebral microvascular disease  I67.89 437.8       2. Stenosis of both carotid arteries without cerebral infarction  I65.23 433.10      433.30       3. Dysarthria due to recent stroke  I69.322 438.13             Plan:     Patient stable over the last year on Plavix therapy, not had any recurrent TIAs, and her Doppler study last year showed less than 50% disease, she is 80years old, doing well, and has no other new neurological problems, no evidence of motor neuron disease, or swallowing is fair and more related to her esophagus, and no evidence of neuromuscular disease. We encouraged the patient to stay mentally and physically active she said she will, and continue her medication, and we will follow her as needed for now, she is doing well for her age.   She had a CTA of the head neck that just shows mild stenosis of the carotid bifurcations, we will check a Doppler study to see if there is any change from the previous study done 2 years ago because of dizziness. Patient with difficulty swallowing over the last year, but seems stable over the last year, not really progressing, and maintaining her weight. Patient still has no signs of motor neuron disease or any neuromuscular disease that I can see at this time. She does have mild bifacial weakness that is a little unclear. She has no atrophy or fasciculations of her tongue, no other bulbar weakness, and no systemic weakness or fasciculations to suggest motor neuron disease or neuromuscular junction disease  She has had no more strokelike symptoms, and will get another carotid Doppler next visit since is been 2 year since her last Doppler. She is to continue her Plavix  Patient has had surgery for cricopharyngeal muscle spasms at times and several Botox injections without improvement in her symptoms but only transitory improvement  No evidence of ALS so far. She is to continue her Plavix therapy and call us if any change in call for results of her tests  Patient with dysphagia and dysarthria and neuromuscular disease of the oral pharynx, without clear fasciculations or atrophy of her muscles or tongue. MRI and EMG and blood tests are all unremarkable except for a questionable anti-neuronal antibody probably related to lupus  We will repeat that next visit. Patient states feels much better, gaining weight, getting stronger all the time and exercising more.   She will call if any problem in the interim, and followup will be arranged in 6 months time or earlier if needed  25 minutes spent with patient and her son-in-law in detail exam, checking her carefully for any fasciculations upper extremities or tongue, and discussing her possible diagnosis, prognosis and treatment    Signed By: Ida Gayle MD     July 21, 2022

## 2022-08-31 ENCOUNTER — TRANSCRIBE ORDER (OUTPATIENT)
Dept: SCHEDULING | Age: 87
End: 2022-08-31

## 2022-08-31 DIAGNOSIS — M81.0 AGE-RELATED OSTEOPOROSIS WITHOUT CURRENT PATHOLOGICAL FRACTURE: Primary | ICD-10-CM

## 2022-08-31 DIAGNOSIS — Z78.0 POSTMENOPAUSE: ICD-10-CM

## 2022-12-01 ENCOUNTER — TRANSCRIBE ORDER (OUTPATIENT)
Dept: SCHEDULING | Age: 87
End: 2022-12-01

## 2022-12-01 DIAGNOSIS — Z78.0 POSTMENOPAUSAL: ICD-10-CM

## 2022-12-01 DIAGNOSIS — M81.0 AGE-RELATED OSTEOPOROSIS WITHOUT CURRENT PATHOLOGICAL FRACTURE: Primary | ICD-10-CM

## 2023-04-21 DIAGNOSIS — Z78.0 POSTMENOPAUSE: ICD-10-CM

## 2023-04-21 DIAGNOSIS — M81.0 AGE-RELATED OSTEOPOROSIS WITHOUT CURRENT PATHOLOGICAL FRACTURE: Primary | ICD-10-CM

## 2023-04-22 DIAGNOSIS — M81.0 AGE-RELATED OSTEOPOROSIS WITHOUT CURRENT PATHOLOGICAL FRACTURE: Primary | ICD-10-CM

## 2023-04-22 DIAGNOSIS — Z78.0 POSTMENOPAUSAL: ICD-10-CM

## 2024-09-17 ENCOUNTER — TRANSCRIBE ORDERS (OUTPATIENT)
Facility: HOSPITAL | Age: 89
End: 2024-09-17

## 2024-09-17 ENCOUNTER — HOSPITAL ENCOUNTER (OUTPATIENT)
Facility: HOSPITAL | Age: 89
Discharge: HOME | End: 2024-09-20
Payer: MEDICARE

## 2024-09-17 ENCOUNTER — HOSPITAL ENCOUNTER (EMERGENCY)
Facility: HOSPITAL | Age: 89
Discharge: HOME | End: 2024-09-17
Attending: STUDENT IN AN ORGANIZED HEALTH CARE EDUCATION/TRAINING PROGRAM
Payer: MEDICARE

## 2024-09-17 ENCOUNTER — APPOINTMENT (OUTPATIENT)
Facility: HOSPITAL | Age: 89
End: 2024-09-17
Payer: MEDICARE

## 2024-09-17 VITALS
BODY MASS INDEX: 18.32 KG/M2 | WEIGHT: 113.98 LBS | DIASTOLIC BLOOD PRESSURE: 87 MMHG | RESPIRATION RATE: 18 BRPM | OXYGEN SATURATION: 100 % | HEART RATE: 70 BPM | TEMPERATURE: 98.4 F | HEIGHT: 66 IN | SYSTOLIC BLOOD PRESSURE: 182 MMHG

## 2024-09-17 DIAGNOSIS — R05.8 DRY COUGH: Primary | ICD-10-CM

## 2024-09-17 DIAGNOSIS — J47.9 BRONCHIECTASIS WITHOUT COMPLICATION (HCC): ICD-10-CM

## 2024-09-17 DIAGNOSIS — J21.9 BRONCHIOLITIS: Primary | ICD-10-CM

## 2024-09-17 DIAGNOSIS — J98.19 LUNG COLLAPSE: ICD-10-CM

## 2024-09-17 DIAGNOSIS — R05.8 DRY COUGH: ICD-10-CM

## 2024-09-17 LAB
ALBUMIN SERPL-MCNC: 4 G/DL (ref 3.5–5)
ALBUMIN/GLOB SERPL: 1.1 (ref 1.1–2.2)
ALP SERPL-CCNC: 56 U/L (ref 45–117)
ALT SERPL-CCNC: 21 U/L (ref 12–78)
ANION GAP SERPL CALC-SCNC: 6 MMOL/L (ref 2–12)
AST SERPL-CCNC: 16 U/L (ref 15–37)
BASOPHILS # BLD: 0 K/UL (ref 0–0.1)
BASOPHILS NFR BLD: 1 % (ref 0–1)
BILIRUB SERPL-MCNC: 0.5 MG/DL (ref 0.2–1)
BUN SERPL-MCNC: 22 MG/DL (ref 6–20)
BUN/CREAT SERPL: 29 (ref 12–20)
CALCIUM SERPL-MCNC: 9.4 MG/DL (ref 8.5–10.1)
CHLORIDE SERPL-SCNC: 104 MMOL/L (ref 97–108)
CO2 SERPL-SCNC: 28 MMOL/L (ref 21–32)
CREAT SERPL-MCNC: 0.75 MG/DL (ref 0.55–1.02)
DIFFERENTIAL METHOD BLD: ABNORMAL
EOSINOPHIL # BLD: 0.2 K/UL (ref 0–0.4)
EOSINOPHIL NFR BLD: 5 % (ref 0–7)
ERYTHROCYTE [DISTWIDTH] IN BLOOD BY AUTOMATED COUNT: 14.6 % (ref 11.5–14.5)
GLOBULIN SER CALC-MCNC: 3.6 G/DL (ref 2–4)
GLUCOSE SERPL-MCNC: 88 MG/DL (ref 65–100)
HCT VFR BLD AUTO: 37.1 % (ref 35–47)
HGB BLD-MCNC: 12.4 G/DL (ref 11.5–16)
IMM GRANULOCYTES # BLD AUTO: 0 K/UL (ref 0–0.04)
IMM GRANULOCYTES NFR BLD AUTO: 0 % (ref 0–0.5)
LYMPHOCYTES # BLD: 1.7 K/UL (ref 0.8–3.5)
LYMPHOCYTES NFR BLD: 37 % (ref 12–49)
MCH RBC QN AUTO: 29.5 PG (ref 26–34)
MCHC RBC AUTO-ENTMCNC: 33.4 G/DL (ref 30–36.5)
MCV RBC AUTO: 88.1 FL (ref 80–99)
MONOCYTES # BLD: 0.5 K/UL (ref 0–1)
MONOCYTES NFR BLD: 12 % (ref 5–13)
NEUTS SEG # BLD: 2 K/UL (ref 1.8–8)
NEUTS SEG NFR BLD: 45 % (ref 32–75)
NRBC # BLD: 0 K/UL (ref 0–0.01)
NRBC BLD-RTO: 0 PER 100 WBC
PLATELET # BLD AUTO: 188 K/UL (ref 150–400)
PMV BLD AUTO: 10 FL (ref 8.9–12.9)
POTASSIUM SERPL-SCNC: 4.1 MMOL/L (ref 3.5–5.1)
PROT SERPL-MCNC: 7.6 G/DL (ref 6.4–8.2)
RBC # BLD AUTO: 4.21 M/UL (ref 3.8–5.2)
SODIUM SERPL-SCNC: 138 MMOL/L (ref 136–145)
SPECIMEN HOLD: NORMAL
TROPONIN I SERPL HS-MCNC: 7 NG/L (ref 0–51)
WBC # BLD AUTO: 4.5 K/UL (ref 3.6–11)

## 2024-09-17 PROCEDURE — 6370000000 HC RX 637 (ALT 250 FOR IP): Performed by: STUDENT IN AN ORGANIZED HEALTH CARE EDUCATION/TRAINING PROGRAM

## 2024-09-17 PROCEDURE — 85025 COMPLETE CBC W/AUTO DIFF WBC: CPT

## 2024-09-17 PROCEDURE — 99284 EMERGENCY DEPT VISIT MOD MDM: CPT

## 2024-09-17 PROCEDURE — 80053 COMPREHEN METABOLIC PANEL: CPT

## 2024-09-17 PROCEDURE — 93005 ELECTROCARDIOGRAM TRACING: CPT | Performed by: STUDENT IN AN ORGANIZED HEALTH CARE EDUCATION/TRAINING PROGRAM

## 2024-09-17 PROCEDURE — 84484 ASSAY OF TROPONIN QUANT: CPT

## 2024-09-17 PROCEDURE — 36415 COLL VENOUS BLD VENIPUNCTURE: CPT

## 2024-09-17 PROCEDURE — 71046 X-RAY EXAM CHEST 2 VIEWS: CPT

## 2024-09-17 PROCEDURE — 71250 CT THORAX DX C-: CPT

## 2024-09-17 RX ADMIN — ALUMINUM HYDROXIDE, MAGNESIUM HYDROXIDE, AND SIMETHICONE 40 ML: 1200; 120; 1200 SUSPENSION ORAL at 17:08

## 2024-09-17 ASSESSMENT — PAIN SCALES - GENERAL: PAINLEVEL_OUTOF10: 0

## 2024-09-18 LAB
EKG ATRIAL RATE: 63 BPM
EKG DIAGNOSIS: NORMAL
EKG P AXIS: 45 DEGREES
EKG P-R INTERVAL: 164 MS
EKG Q-T INTERVAL: 456 MS
EKG QRS DURATION: 96 MS
EKG QTC CALCULATION (BAZETT): 466 MS
EKG R AXIS: -35 DEGREES
EKG T AXIS: 47 DEGREES
EKG VENTRICULAR RATE: 63 BPM

## 2025-09-02 ENCOUNTER — TRANSCRIBE ORDERS (OUTPATIENT)
Facility: HOSPITAL | Age: 89
End: 2025-09-02

## 2025-09-02 DIAGNOSIS — R41.3 MEMORY LOSS: Primary | ICD-10-CM

## (undated) DEVICE — THE MONARCH® "D" CARTRIDGE IS A SINGLE-USE POLYPROPYLENE CARTRIDGE FOR POSTERIOR CHAMBER IOL DELIVERY: Brand: MONARCH® III

## (undated) DEVICE — PACKING 8004000 NEURAY 200PK 13X13MM: Brand: NEURAY ®

## (undated) DEVICE — Device

## (undated) DEVICE — SOL ANTI-FOG 6ML MEDC -- MEDICHOICE - CONVERT TO 358427

## (undated) DEVICE — SKIN MARKER,REGULAR TIP WITH RULER AND LABELS: Brand: DEVON

## (undated) DEVICE — (D)SYR 10ML 1/5ML GRAD NSAF -- PKGING CHANGE USE ITEM 338027

## (undated) DEVICE — CONTINU-FLO SOLUTION SET, 2 INJECTION SITES, MALE LUER LOCK ADAPTER WITH RETRACTABLE COLLAR, LARGE BORE STOPCOCK WITH ROTATING MALE LUER LOCK EXTENSION SET, 2 INJECTION SITES, MALE LUER LOCK ADAPTER WITH RETRACTABLE COLLAR: Brand: INTERLINK/CONTINU-FLO

## (undated) DEVICE — AIRLIFE™ CORRUGATED FLEXIBLE POLYETHYLENE AND EVA TUBING FOR AEROSOL AND IPPB USE, SEGMENTED, 6 FEET (1.8 M) LENGTH, 22 MM I.D.: Brand: AIRLIFE™

## (undated) DEVICE — NDL FLTR TIP 5 MIC 18GX1.5IN --

## (undated) DEVICE — SYRINGE INSULIN 1ML LUERSLIP TIP W/O SFTY U100 BLISTER PK

## (undated) DEVICE — 1200 GUARD II KIT W/5MM TUBE W/O VAC TUBE: Brand: GUARDIAN

## (undated) DEVICE — SYR 10ML LUER LOK 1/5ML GRAD --

## (undated) DEVICE — 3M™ TEGADERM™ TRANSPARENT FILM DRESSING FRAME STYLE, 1624W, 2-3/8 IN X 2-3/4 IN (6 CM X 7 CM), 100/CT 4CT/CASE: Brand: 3M™ TEGADERM™

## (undated) DEVICE — SYR 10ML CTRL LR LCK NSAF LF --

## (undated) DEVICE — SYR IRR BLB 2OZ DISP BLU STRL -- CONVERT TO ITEM 357637

## (undated) DEVICE — SOLUTION IV 250ML 0.9% SOD CHL CLR INJ FLX BG CONT PRT CLSR

## (undated) DEVICE — MEDI-VAC NON-CONDUCTIVE SUCTION TUBING: Brand: CARDINAL HEALTH

## (undated) DEVICE — SOLUTION IV 1000ML 0.9% SOD CHL

## (undated) DEVICE — SYR 5ML 1/5 GRAD LL NSAF LF --

## (undated) DEVICE — SOLUTION LACTATED RINGERS INJECTION USP

## (undated) DEVICE — X-RAY SPONGES,16 PLY: Brand: DERMACEA

## (undated) DEVICE — SET 2ND L34IN N DEHP THE QUEENS MED CNTR VALUELINK

## (undated) DEVICE — KENDALL DL ECG CABLE AND LEAD WIRE SYSTEM, 3-LEAD, SINGLE PATIENT USE: Brand: KENDALL

## (undated) DEVICE — SYR 3ML LL TIP 1/10ML GRAD --

## (undated) DEVICE — TOWEL SURG W17XL27IN STD BLU COT NONFENESTRATED PREWASHED

## (undated) DEVICE — APPLICATOR FBR TIP L6IN COT TIP WOOD SHFT SWAB 2000 PER CA

## (undated) DEVICE — STERILE POLYISOPRENE POWDER-FREE SURGICAL GLOVES: Brand: PROTEXIS

## (undated) DEVICE — GLOVE ORANGE PI 7   MSG9070

## (undated) DEVICE — GUARD TEETH AD NYL FOR LARYNSCP POS PROTCT

## (undated) DEVICE — INFECTION CONTROL KIT SYS

## (undated) DEVICE — SOLUTION IRRIG 500ML STRL H2O NONPYROGENIC

## (undated) DEVICE — SURGICAL PROCEDURE PACK CATRCT CUST

## (undated) DEVICE — AIRLIFE™ FACE TENT MASK VINYL: Brand: AIRLIFE™

## (undated) DEVICE — COVER,TABLE,60X90,STERILE: Brand: MEDLINE

## (undated) DEVICE — KIT ADAP STERILE WATER 1000ML -- AIRLIFE